# Patient Record
Sex: MALE | Race: BLACK OR AFRICAN AMERICAN | NOT HISPANIC OR LATINO | Employment: UNEMPLOYED | URBAN - METROPOLITAN AREA
[De-identification: names, ages, dates, MRNs, and addresses within clinical notes are randomized per-mention and may not be internally consistent; named-entity substitution may affect disease eponyms.]

---

## 2017-03-13 ENCOUNTER — HOSPITAL ENCOUNTER (EMERGENCY)
Facility: HOSPITAL | Age: 57
Discharge: HOME | End: 2017-03-13

## 2017-03-13 VITALS
HEART RATE: 100 BPM | TEMPERATURE: 98 F | HEIGHT: 69 IN | RESPIRATION RATE: 20 BRPM | OXYGEN SATURATION: 96 % | BODY MASS INDEX: 29.62 KG/M2 | WEIGHT: 200 LBS | SYSTOLIC BLOOD PRESSURE: 165 MMHG | DIASTOLIC BLOOD PRESSURE: 95 MMHG

## 2017-03-13 DIAGNOSIS — B34.9 VIRAL SYNDROME: Primary | ICD-10-CM

## 2017-03-13 DIAGNOSIS — R52 BODY ACHES: ICD-10-CM

## 2017-03-13 PROCEDURE — 96372 THER/PROPH/DIAG INJ SC/IM: CPT

## 2017-03-13 PROCEDURE — 63600175 PHARM REV CODE 636 W HCPCS: Performed by: EMERGENCY MEDICINE

## 2017-03-13 PROCEDURE — 99283 EMERGENCY DEPT VISIT LOW MDM: CPT | Mod: 25

## 2017-03-13 RX ORDER — DEXAMETHASONE SODIUM PHOSPHATE 4 MG/ML
8 INJECTION, SOLUTION INTRA-ARTICULAR; INTRALESIONAL; INTRAMUSCULAR; INTRAVENOUS; SOFT TISSUE
Status: COMPLETED | OUTPATIENT
Start: 2017-03-13 | End: 2017-03-13

## 2017-03-13 RX ADMIN — DEXAMETHASONE SODIUM PHOSPHATE 8 MG: 4 INJECTION, SOLUTION INTRAMUSCULAR; INTRAVENOUS at 10:03

## 2017-03-14 NOTE — ED PROVIDER NOTES
SCRIBE #1 NOTE: I, Shalom Short, am scribing for, and in the presence of, Stanley Gray MD. I have scribed the entire note.      History      Chief Complaint   Patient presents with    Generalized Body Aches     x 2 days, cough       Review of patient's allergies indicates:  No Known Allergies     HPI   HPI    3/13/2017, 10:44 PM   History obtained from the patient      History of Present Illness: Maureen Luther is a 56 y.o. male patient who presents to the Emergency Department for generalized myalgias which onset gradually 2 days ago. Sx are located to BLE. Sx are constant and moderate in severity. Sx are described as aches. There are no mitigating or exacerbating factors noted. Associated sx include cough.  Pt denies any fever, CP, SOB, N/V/D, focal weakness or numbness, and all other sx at this time. Pt is current smoker. No further complaints or concerns at this time.       Arrival mode: Personal vehicle     PCP: Primary Doctor No       Past Medical History:  Past Medical History:   Diagnosis Date    Facial fracture     Medical history non-contributory     Scrotal abscess        Past Surgical History:  Past Surgical History:   Procedure Laterality Date    None      ORBITAL RECONSTRUCTION      SCROTAL SURGERY           Family History:  Family History   Problem Relation Age of Onset    Hypertension         Social History:  Social History     Social History Main Topics    Smoking status: Current Every Day Smoker     Packs/day: 1.00     Types: Cigarettes    Smokeless tobacco: Not on file    Alcohol use No    Drug use: No    Sexual activity: Not on file       ROS   Review of Systems   Constitutional: Negative for chills and fever.   HENT: Negative for sore throat and trouble swallowing.    Respiratory: Positive for cough. Negative for shortness of breath.    Cardiovascular: Negative for chest pain.   Gastrointestinal: Negative for abdominal pain, diarrhea, nausea and vomiting.   Genitourinary: Negative  "for dysuria and hematuria.   Musculoskeletal: Positive for myalgias. Negative for back pain.   Skin: Negative for rash and wound.   Neurological: Negative for weakness and numbness.   Hematological: Does not bruise/bleed easily.   All other systems reviewed and are negative.      Physical Exam    Initial Vitals   BP Pulse Resp Temp SpO2   03/13/17 2108 03/13/17 2108 03/13/17 2108 03/13/17 2108 03/13/17 2108   165/95 100 20 98.2 °F (36.8 °C) 96 %      Physical Exam  Nursing Notes and Vital Signs Reviewed.  Constitutional: Patient is in no acute distress. Awake and alert. Well-developed and well-nourished.  Head: Atraumatic. Normocephalic.  Eyes: PERRL. EOM intact. Conjunctivae are not pale. No scleral icterus.  ENT: Mucous membranes are moist. Oropharynx is clear and symmetric.    Neck: Supple. Full ROM. No lymphadenopathy.  Cardiovascular: Regular rate. Regular rhythm. No murmurs, rubs, or gallops. Distal pulses are 2+ and symmetric.  Pulmonary/Chest: No respiratory distress. Clear to auscultation bilaterally. No wheezing, rales, or rhonchi.  Abdominal: Soft and non-distended.  There is no tenderness.  No rebound, guarding, or rigidity.  Good bowel sounds.    Genitourinary: No CVA tenderness  Musculoskeletal: Moves all extremities. No obvious deformities. No edema. No calf tenderness.  Skin: Warm and dry.  Neurological:  Alert, awake, and appropriate.  Normal speech.  No acute focal neurological deficits are appreciated.  Psychiatric: Normal affect. Good eye contact. Appropriate in content.    ED Course    Procedures  ED Vital Signs:  Vitals:    03/13/17 2108   BP: (!) 165/95   Pulse: 100   Resp: 20   Temp: 98.2 °F (36.8 °C)   TempSrc: Oral   SpO2: 96%   Weight: 90.7 kg (200 lb)   Height: 5' 9" (1.753 m)      The Emergency Provider reviewed the vital signs and test results, which are outlined above.    ED Discussion     10:48 PM: Initial encounter. Discussed with pt all pertinent ED information. Discussed pt dx and " plan of tx. Gave pt all f/u and return to the ED instructions. All questions and concerns were addressed at this time. Pt expresses understanding of information and instructions, and is comfortable with plan to discharge. Pt is stable for discharge.        I discussed with patient and/or family/caretaker that evaluation in the ED does not suggest any emergent or life threatening medical conditions requiring immediate intervention beyond what was provided in the ED, and I believe patient is safe for discharge.  Regardless, an unremarkable evaluation in the ED does not preclude the development or presence of a serious of life threatening condition. As such, patient was instructed to return immediately for any worsening or change in current symptoms.      Pre-hypertension/Hypertension: The pt has been informed that they may have pre-hypertension or hypertension based on a blood pressure reading in the ED. I recommend that the pt call the PCP listed on their discharge instructions or a physician of their choice this week to arrange f/u for further evaluation of possible pre-hypertension or hypertension.     ED Medication(s):  Medications   dexamethasone injection 8 mg (8 mg Intramuscular Given 3/13/17 4521)       There are no discharge medications for this patient.      Follow-up Information     Follow up with [facility] In 2 days.    Specialty:  Internal Medicine    Contact information:    [address] [address] [facility] [address] [phone]    Additional information:    (off [address]) 1st floor        Follow up with [facility].    Specialty:  Emergency Medicine    Why:  If symptoms worsen    Contact information:    [address] [address] [facility] [address] [phone]             Medical Decision Making        Additional MDM:   Smoking Cessation: The patient is a smoker. The patient was counseled on smoking cessation for: 4 minutes. The patient  was counseled on tobacco related  health complications.        Scribe Attestation:   Scribe #1: I performed the above scribed service and the documentation accurately describes the services I performed. I attest to the accuracy of the note.    Attending:   Physician Attestation Statement for Scribe #1: I, Stanley Gray MD, personally performed the services described in this documentation, as scribed by Shalom Short in my presence, and it is both accurate and complete.          Clinical Impression       ICD-10-CM ICD-9-CM   1. Viral syndrome B34.9 079.99   2. Body aches R52 780.96       Disposition:   Disposition: Discharged  Condition: Stable         Stanley Gray MD  03/15/17 9676

## 2017-03-14 NOTE — DISCHARGE INSTRUCTIONS
"  Viral Syndrome (Adult)  A viral illness may cause a number of symptoms. The symptoms depend on the part of the body that the virus affects. If it settles in your nose, throat, and lungs, it may cause cough, sore throat, congestion, and sometimes headache. If it settles in your stomach and intestinal tract, it may cause vomiting and diarrhea. Sometimes it causes vague symptoms like "aching all over," feeling tired, loss of appetite, or fever.  A viral illness usually lasts 1 to 2 weeks, but sometimes it lasts longer. In some cases, a more serious infection can look like a viral syndrome in the first few days of the illness. You may need another exam and additional tests to know the difference. Watch for the warning signs listed below.  Home care  Follow these guidelines for taking care of yourself at home:  · If symptoms are severe, rest at home for the first 2 to 3 days.  · Stay away from cigarette smoke - both your smoke and the smoke from others.  · You may use over-the-counter acetaminophen or ibuprofen for fever, muscle aching, and headache, unless another medicine was prescribed for this. If you have chronic liver or kidney disease or ever had a stomach ulcer or GI bleeding, talk with your doctor before using these medicines. No one who is younger than 18 and ill with a fever should take aspirin. It may cause severe disease or death.  · Your appetite may be poor, so a light diet is fine. Avoid dehydration by drinking 8 to 12 8-ounce glasses of fluids each day. This may include water; orange juice; lemonade; apple, grape, and cranberry juice; clear fruit drinks; electrolyte replacement and sports drinks; and decaffeinated teas and coffee. If you have been diagnosed with a kidney disease, ask your doctor how much and what types of fluids you should drink to prevent dehydration. If you have kidney disease, drinking too much fluid can cause it build up in the your body and be dangerous to your " health.  · Over-the-counter remedies won't shorten the length of the illness but may be helpful for cough, sore throat; and nasal and sinus congestion. Don't use decongestants if you have high blood pressure.  Follow-up care  Follow up with your healthcare provider if you do not improve over the next week.  Call 911  Get emergency medical care if any of the following occur:  · Convulsion  · Feeling weak, dizzy, or like you are going to faint  · Chest pain, shortness of breath, wheezing, or difficulty breathing  When to seek medical advice  Call your healthcare provider right away if any of these occur:  · Cough with lots of colored sputum (mucus) or blood in your sputum  · Chest pain, shortness of breath, wheezing, or difficulty breathing  · Severe headache; face, neck, or ear pain  · Severe, constant pain in the lower right side of your belly (abdominal)  · Continued vomiting (cant keep liquids down)  · Frequent diarrhea (more than 5 times a day); blood (red or black color) or mucus in diarrhea  · Feeling weak, dizzy, or like you are going to faint  · Extreme thirst  · Fever of 100.4°F (38°C) or higher, or as directed by your healthcare provider  Date Last Reviewed: 9/25/2015  © 9615-6275 [employer]. [address] [place] [address], [place] [address]. All rights reserved. This information is not intended as a substitute for professional medical care. Always follow your healthcare professional's instructions.

## 2017-03-24 ENCOUNTER — HOSPITAL ENCOUNTER (EMERGENCY)
Facility: HOSPITAL | Age: 57
Discharge: HOME | End: 2017-03-24
Attending: EMERGENCY MEDICINE

## 2017-03-24 VITALS
TEMPERATURE: 97 F | DIASTOLIC BLOOD PRESSURE: 110 MMHG | SYSTOLIC BLOOD PRESSURE: 174 MMHG | HEART RATE: 73 BPM | OXYGEN SATURATION: 97 % | HEIGHT: 69 IN | RESPIRATION RATE: 20 BRPM | BODY MASS INDEX: 29.62 KG/M2 | WEIGHT: 200 LBS

## 2017-03-24 DIAGNOSIS — H70.91 MASTOIDITIS OF RIGHT SIDE: Primary | ICD-10-CM

## 2017-03-24 PROCEDURE — 99283 EMERGENCY DEPT VISIT LOW MDM: CPT

## 2017-03-24 RX ORDER — NAPROXEN 375 MG/1
375 TABLET ORAL 2 TIMES DAILY WITH MEALS
Qty: 14 TABLET | Refills: 0 | Status: SHIPPED | OUTPATIENT
Start: 2017-03-24 | End: 2017-11-14 | Stop reason: ALTCHOICE

## 2017-03-24 RX ORDER — AMOXICILLIN AND CLAVULANATE POTASSIUM 875; 125 MG/1; MG/1
1 TABLET, FILM COATED ORAL 2 TIMES DAILY
Qty: 14 TABLET | Refills: 0 | Status: SHIPPED | OUTPATIENT
Start: 2017-03-24 | End: 2017-04-03

## 2017-03-24 RX ORDER — TRAMADOL HYDROCHLORIDE 50 MG/1
50 TABLET ORAL EVERY 6 HOURS PRN
Qty: 12 TABLET | Refills: 0 | Status: SHIPPED | OUTPATIENT
Start: 2017-03-24 | End: 2017-11-14 | Stop reason: ALTCHOICE

## 2017-03-24 NOTE — ED AVS SNAPSHOT
[facility]  [id number] [place] [id number]               Maureen Luther   3/24/2017  3:57 AM   ED    Description:  Male : 1960   Department:  [facility]            Your Care was Coordinated By:     Provider Role From To    Chasity Irizarry MD Attending Provider 17 0423 --      Reason for Visit     Otalgia           Diagnoses this Visit        Comments    Mastoiditis of right side    -  Primary       ED Disposition     ED Disposition Condition Comment    Discharge             To Do List           Follow-up Information     Follow up with [facility] In 3 days.    Contact information:    [address] [facility] [address]  [phone]          Follow up with [facility].    Specialty:  Emergency Medicine    Why:  As needed, If symptoms worsen    Contact information:    [address] [place] [id number] [phone]       These Medications        Disp Refills Start End    amoxicillin-clavulanate 875-125mg (AUGMENTIN) 875-125 mg per tablet 14 tablet 0 3/24/2017 4/3/2017    Take 1 tablet by mouth 2 (two) times daily. - Oral    naproxen (NAPROSYN) 375 MG tablet 14 tablet 0 3/24/2017     Take 1 tablet (375 mg total) by mouth 2 (two) times daily with meals. - Oral    tramadol (ULTRAM) 50 mg tablet 12 tablet 0 3/24/2017     Take 1 tablet (50 mg total) by mouth every 6 (six) hours as needed for Pain. - Oral      [facility]     [facility] -  Assistance  Registered nurses in the [facility] provide clinical advisement, health education, appointment booking, and other advisory services.  Call for this free service at [phone].             Medications           Message regarding Medications     Verify the changes and/or additions to your medication regime listed below are the same as discussed with your clinician today.  If any of  "these changes or additions are incorrect, please notify your healthcare provider.        START taking these NEW medications        Refills    amoxicillin-clavulanate 875-125mg (AUGMENTIN) 875-125 mg per tablet 0    Sig: Take 1 tablet by mouth 2 (two) times daily.    Class: Print    Route: Oral    naproxen (NAPROSYN) 375 MG tablet 0    Sig: Take 1 tablet (375 mg total) by mouth 2 (two) times daily with meals.    Class: Print    Route: Oral    tramadol (ULTRAM) 50 mg tablet 0    Sig: Take 1 tablet (50 mg total) by mouth every 6 (six) hours as needed for Pain.    Class: Print    Route: Oral           Verify that the below list of medications is an accurate representation of the medications you are currently taking.  If none reported, the list may be blank. If incorrect, please contact your healthcare provider. Carry this list with you in case of emergency.           Current Medications     amoxicillin-clavulanate 875-125mg (AUGMENTIN) 875-125 mg per tablet Take 1 tablet by mouth 2 (two) times daily.    naproxen (NAPROSYN) 375 MG tablet Take 1 tablet (375 mg total) by mouth 2 (two) times daily with meals.    tramadol (ULTRAM) 50 mg tablet Take 1 tablet (50 mg total) by mouth every 6 (six) hours as needed for Pain.           Clinical Reference Information           Your Vitals Were     BP Pulse Temp Resp Height Weight    174/110 (BP Location: Right arm, Patient Position: Sitting) 73 96.9 °F (36.1 °C) (Oral) 20 5' 9" (1.753 m) 90.7 kg (200 lb)    SpO2 BMI             97% 29.53 kg/m2         Allergies as of 3/24/2017     No Known Allergies      Immunizations Administered on Date of Encounter - 3/24/2017     None      ED Micro, Lab, POCT     None      ED Imaging Orders     None      Discharge References/Attachments     EARACHE WITHOUT INFECTION (ADULT) (ENGLISH)      [facility] Sign-Up     Activating your [facility] account is as easy as 1-2-3!     1) Visit [email or web address], select Sign Up Now, enter this activation code and " your date of birth, then select Next.  [email or web address]  Expires: 4/27/2017 10:47 PM      2) Create a username and password to use when you visit [facility] in the future and select a security question in case you lose your password and select Next.    3) Enter your e-mail address and click Sign Up!    Additional Information  If you have questions, please e-mail Managed [email or web address] or call [phone] to talk to our [facility] staff. Remember, [employer] is NOT to be used for urgent needs. For medical emergencies, dial 911.          [facility] complies with applicable Federal civil rights laws and does not discriminate on the basis of race, color, national origin, age, disability, or sex.        Language Assistance Services     ATTENTION: Language assistance services are available, free of charge. Please call [phone].      ATENCIÓN: Si habla español, tiene a florez disposición servicios gratuitos de asistencia lingüística. Llame al [phone].     CHÚ Ý: N?u b?n nói Ti?ng Vi?t, có các d?ch v? h? tr? ngôn ng? mi?n phí dành cho b?n. G?i s? [phone].

## 2017-11-13 VITALS
RESPIRATION RATE: 20 BRPM | HEIGHT: 69 IN | SYSTOLIC BLOOD PRESSURE: 127 MMHG | DIASTOLIC BLOOD PRESSURE: 82 MMHG | OXYGEN SATURATION: 99 % | BODY MASS INDEX: 24.33 KG/M2 | TEMPERATURE: 98 F | HEART RATE: 80 BPM | WEIGHT: 164.25 LBS

## 2017-11-13 PROCEDURE — 99283 EMERGENCY DEPT VISIT LOW MDM: CPT

## 2017-11-14 ENCOUNTER — HOSPITAL ENCOUNTER (EMERGENCY)
Facility: HOSPITAL | Age: 57
Discharge: HOME | End: 2017-11-14
Attending: EMERGENCY MEDICINE

## 2017-11-14 DIAGNOSIS — R52 PAIN: ICD-10-CM

## 2017-11-14 DIAGNOSIS — L29.9 ITCHING: ICD-10-CM

## 2017-11-14 DIAGNOSIS — B86 SCABIES: Primary | ICD-10-CM

## 2017-11-14 RX ORDER — PERMETHRIN 50 MG/G
CREAM TOPICAL
Qty: 60 G | Refills: 1 | Status: SHIPPED | OUTPATIENT
Start: 2017-11-14 | End: 2019-03-29

## 2017-11-14 RX ORDER — DICLOFENAC SODIUM 50 MG/1
50 TABLET, DELAYED RELEASE ORAL 3 TIMES DAILY PRN
Qty: 20 TABLET | Refills: 0 | Status: SHIPPED | OUTPATIENT
Start: 2017-11-14 | End: 2019-03-29

## 2017-11-14 RX ORDER — HYDROXYZINE HYDROCHLORIDE 25 MG/1
25 TABLET, FILM COATED ORAL EVERY 6 HOURS
Qty: 12 TABLET | Refills: 0 | Status: SHIPPED | OUTPATIENT
Start: 2017-11-14 | End: 2023-08-30

## 2017-11-14 NOTE — ED PROVIDER NOTES
HISTORY     Chief Complaint   Patient presents with    Rash     pt reports rash to lower abd, spine, shoulder blades, and legs for about 2 weeks     Review of patient's allergies indicates:  No Known Allergies     HPI   The history is provided by the patient.   Rash    This is a new problem. The current episode started several days ago. The problem has been gradually worsening. Associated with: kid with scabies  The rash is present on the right arm, left arm, back, abdomen, right hand and left hand. Associated symptoms include itching. He has tried antibiotic cream for the symptoms. The treatment provided no relief.        PCP: Primary Doctor No     Past Medical History:  Past Medical History:   Diagnosis Date    Facial fracture     Medical history non-contributory     Scrotal abscess         Past Surgical History:  Past Surgical History:   Procedure Laterality Date    None      ORBITAL RECONSTRUCTION      SCROTAL SURGERY          Family History:  Family History   Problem Relation Age of Onset    Hypertension          Social History:  Social History     Social History Main Topics    Smoking status: Current Every Day Smoker     Packs/day: 1.00     Types: Cigarettes    Smokeless tobacco: Not on file    Alcohol use No    Drug use: No    Sexual activity: Not on file         ROS   Review of Systems   Constitutional: Negative for fever.   HENT: Negative for sore throat.    Respiratory: Negative for shortness of breath.    Cardiovascular: Negative for chest pain.   Gastrointestinal: Negative for nausea.   Genitourinary: Negative for dysuria.   Musculoskeletal: Negative for back pain.   Skin: Positive for itching and rash.   Neurological: Negative for weakness.   Hematological: Does not bruise/bleed easily.       PHYSICAL EXAM     Initial Vitals [11/13/17 2328]   BP Pulse Resp Temp SpO2   127/82 80 20 98.1 °F (36.7 °C) 99 %      MAP       97           Physical Exam    Constitutional: He appears  "well-developed and well-nourished. No distress.   HENT:   Head: Normocephalic and atraumatic.   Eyes: Conjunctivae are normal. Pupils are equal, round, and reactive to light.   Neck: Normal range of motion. Neck supple.   Cardiovascular: Normal rate, regular rhythm and normal heart sounds.   Pulmonary/Chest: Breath sounds normal.   Abdominal: Soft. Bowel sounds are normal.   Musculoskeletal: Normal range of motion.   Neurological: He is alert and oriented to person, place, and time. No cranial nerve deficit.   Skin: Skin is warm and dry.   Linear type lesion with undermining. excoriation brooks. Rash appears to be like scabies in nature.     Psychiatric: He has a normal mood and affect.          ED COURSE   Procedures  ED ONGOING VITALS:  Vitals:    11/13/17 2328   BP: 127/82   Pulse: 80   Resp: 20   Temp: 98.1 °F (36.7 °C)   TempSrc: Oral   SpO2: 99%   Weight: 74.5 kg (164 lb 3.9 oz)   Height: 5' 9" (1.753 m)         ABNORMAL LAB VALUES:  Labs Reviewed - No data to display      ALL LAB VALUES:      RADIOLOGY STUDIES:  Imaging Results    None                   The above vital signs and test results have been reviewed by the emergency provider.     ED Medications:  Medications - No data to display    Discharge Medications:  Discharge Medication List as of 11/14/2017 12:51 AM      START taking these medications    Details   diclofenac (VOLTAREN) 50 MG EC tablet Take 1 tablet (50 mg total) by mouth 3 (three) times daily as needed., Starting Tue 11/14/2017, Print      hydrOXYzine HCl (ATARAX) 25 MG tablet Take 1 tablet (25 mg total) by mouth every 6 (six) hours., Starting Tue 11/14/2017, Print      permethrin (ELIMITE) 5 % cream Apply to affected area once and repeat in 7 days, Print            Follow-up Information     [facility]    Specialty:  Emergency Medicine  Why:  As needed, If symptoms worsen  Contact information:  [address]  [facility] [address] [phone]        "    Schedule an appointment as soon as possible for a visit  with PCP.    Contact information:  [phone]                I discussed with patient and/or family/caretaker that evaluation in the ED does not suggest any emergent or life threatening medical conditions requiring immediate intervention beyond what was provided in the ED, and I believe patient is safe for discharge. Regardless, an unremarkable evaluation in the ED does not preclude the development or presence of a serious or life threatening condition. As such, patient was instructed to return immediately for any worsening or change in current symptoms.           MEDICAL DECISION MAKING                 CLINICAL IMPRESSION       ICD-10-CM ICD-9-CM   1. Scabies B86 133.0   2. Itching L29.9 698.9   3. Pain R52 780.96       Disposition:   Disposition: Discharged  Condition: Stable         Miller Mccullough NP  11/14/17 0215

## 2017-11-26 ENCOUNTER — HOSPITAL ENCOUNTER (EMERGENCY)
Facility: HOSPITAL | Age: 57
Discharge: HOME | End: 2017-11-26
Attending: EMERGENCY MEDICINE

## 2017-11-26 VITALS
HEIGHT: 69 IN | WEIGHT: 157.19 LBS | BODY MASS INDEX: 23.28 KG/M2 | DIASTOLIC BLOOD PRESSURE: 80 MMHG | OXYGEN SATURATION: 95 % | SYSTOLIC BLOOD PRESSURE: 122 MMHG | TEMPERATURE: 99 F | HEART RATE: 91 BPM | RESPIRATION RATE: 16 BRPM

## 2017-11-26 DIAGNOSIS — J20.9 ACUTE BRONCHITIS, UNSPECIFIED ORGANISM: Primary | ICD-10-CM

## 2017-11-26 DIAGNOSIS — J01.00 ACUTE MAXILLARY SINUSITIS, RECURRENCE NOT SPECIFIED: ICD-10-CM

## 2017-11-26 PROCEDURE — 99283 EMERGENCY DEPT VISIT LOW MDM: CPT

## 2017-11-26 RX ORDER — LEVOFLOXACIN 500 MG/1
500 TABLET, FILM COATED ORAL DAILY
Qty: 10 TABLET | Refills: 0 | Status: SHIPPED | OUTPATIENT
Start: 2017-11-26 | End: 2017-12-06

## 2017-11-26 RX ORDER — PREDNISONE 50 MG/1
50 TABLET ORAL DAILY
Qty: 5 TABLET | Refills: 0 | Status: SHIPPED | OUTPATIENT
Start: 2017-11-26 | End: 2017-12-01

## 2017-11-26 RX ORDER — PROMETHAZINE HYDROCHLORIDE AND CODEINE PHOSPHATE 6.25; 1 MG/5ML; MG/5ML
5 SOLUTION ORAL EVERY 4 HOURS PRN
Qty: 240 ML | Refills: 0 | Status: SHIPPED | OUTPATIENT
Start: 2017-11-26 | End: 2017-12-06

## 2017-11-26 NOTE — ED PROVIDER NOTES
SCRIBE #1 NOTE: I, Babita Saleh, am scribing for, and in the presence of, Anjel Rodriguez NP. I have scribed the entire note.      History      Chief Complaint   Patient presents with    coughing     x 1 week, now pain to bilateral sides of chest and abd, also facial pain, denies fever       Review of patient's allergies indicates:  No Known Allergies     HPI   HPI    11/26/2017, 3:14 PM   History obtained from the patient      History of Present Illness: Maureen Luther is a 57 y.o. male patient who presents to the Emergency Department for coughing which onset gradually 7 days ago. Symptoms are constant and moderate in severity. No mitigating or exacerbating factors reported. Associated sxs include congestion, facial pain, and abd pain. Patient denies any fever, chills, n/v/d, CP, SOB, and all other sxs at this time. Prior Tx includes OTC medication with no relief. No further complaints or concerns at this time.         Arrival mode: Personal vehicle     PCP: Primary Doctor No       Past Medical History:  Past Medical History:   Diagnosis Date    Facial fracture     Medical history non-contributory     Scrotal abscess        Past Surgical History:  Past Surgical History:   Procedure Laterality Date    None      ORBITAL RECONSTRUCTION      SCROTAL SURGERY           Family History:  Family History   Problem Relation Age of Onset    Hypertension         Social History:  Social History     Social History Main Topics    Smoking status: Current Every Day Smoker     Packs/day: 1.00     Types: Cigarettes    Smokeless tobacco: Not on file    Alcohol use No    Drug use: No    Sexual activity: Not on file       ROS   Review of Systems   Constitutional: Negative for chills and fever.   HENT: Positive for congestion and sinus pain. Negative for sore throat.    Respiratory: Positive for cough. Negative for shortness of breath.    Cardiovascular: Negative for chest pain.   Gastrointestinal: Positive for abdominal  pain. Negative for diarrhea, nausea and vomiting.   Genitourinary: Negative for dysuria.   Musculoskeletal: Negative for back pain.   Skin: Negative for rash.   Neurological: Negative for weakness.   Hematological: Does not bruise/bleed easily.   All other systems reviewed and are negative.    Physical Exam      Initial Vitals [11/26/17 1502]   BP Pulse Resp Temp SpO2   122/80 91 16 99.3 °F (37.4 °C) 95 %      MAP       94          Physical Exam  Nursing Notes and Vital Signs Reviewed.  Constitutional: Patient is in no acute distress. Well-developed and well-nourished.  Head: Atraumatic. Normocephalic. L maxillary sinus tenderness.   Eyes: PERRL. EOM intact. Conjunctivae are not pale. No scleral icterus.  Ears: Right TM normal. Left TM normal. No erythema. No bulging. No effusion or air-fluid levels. No perforation.   Nose: Patent nares. Turbinates are normal. Nasal drainage.   Throat: Moist mucous membranes. Posterior oropharynx is symmetric without erythema. Tonsillar exudate is present. No trismus. Normal handling of secretions. No stridor.   Neck: Supple. Full ROM. No lymphadenopathy.  Cardiovascular: Regular rate. Regular rhythm. No murmurs, rubs, or gallops. Distal pulses are 2+ and symmetric.  Pulmonary/Chest: No respiratory distress. Bilateral chest congestion that clears with coughing.  Abdominal: Soft and non-distended.  There is no tenderness.  No rebound, guarding, or rigidity. Good bowel sounds.  Genitourinary: No CVA tenderness  Musculoskeletal: Moves all extremities. No obvious deformities. No edema. No calf tenderness.  Skin: Warm and dry.  Neurological:  Alert, awake, and appropriate.  Normal speech.  No acute focal neurological deficits are appreciated.  Psychiatric: Normal affect. Good eye contact. Appropriate in content.      ED Course    Procedures  ED Vital Signs:  Vitals:    11/26/17 1502   BP: 122/80   Pulse: 91   Resp: 16   Temp: 99.3 °F (37.4 °C)   TempSrc: Oral   SpO2: 95%   Weight: 71.3  "kg (157 lb 3 oz)   Height: 5' 9" (1.753 m)                The Emergency Provider reviewed the vital signs and test results, which are outlined above.    ED Discussion     3:17 PM: Discussed with pt all pertinent ED information and results. Discussed pt dx and plan of tx. Gave pt all f/u and return to the ED instructions. All questions and concerns were addressed at this time. Pt expresses understanding of information and instructions, and is comfortable with plan to discharge. Pt is stable for discharge.    Driving or other activities under influence of medications - Patient and/or family/caretaker was given a prescription for, or instructed to use a medicine that may impair ability to drive, operate machinery, or participate in other potentially dangerous activities.  Patient was instructed not to participate in these activities while under the influence of these medications.      ED Medication(s):  Medications - No data to display    New Prescriptions    LEVOFLOXACIN (LEVAQUIN) 500 MG TABLET    Take 1 tablet (500 mg total) by mouth once daily.    PREDNISONE (DELTASONE) 50 MG TAB    Take 1 tablet (50 mg total) by mouth once daily.    PROMETHAZINE-CODEINE 6.25-10 MG/5 ML (PHENERGAN WITH CODEINE) 6.25-10 MG/5 ML SYRUP    Take 5 mLs by mouth every 4 (four) hours as needed for Cough.       Follow-up Information     Primary Doctor No. Schedule an appointment as soon as possible for a visit in 2 days.                   Medical Decision Making              Scribe Attestation:   Scribe #1: I performed the above scribed service and the documentation accurately describes the services I performed. I attest to the accuracy of the note.    Attending:   Physician Attestation Statement for Scribe #1: I, Anjel Rodriguez NP, personally performed the services described in this documentation, as scribed by Babita Saleh, in my presence, and it is both accurate and complete.         Attending Attestation:     Physician Attestation " Statement for NP/PA:   I discussed this assessment and plan of this patient with the NP/PA, but I did not personally examine the patient. The face to face encounter was performed by the NP/PA.              Clinical Impression       ICD-10-CM ICD-9-CM   1. Acute bronchitis, unspecified organism J20.9 466.0   2. Acute maxillary sinusitis, recurrence not specified J01.00 461.0       Disposition:   Disposition: Discharged  Condition: Stable         Anjel Rodriguez NP  11/26/17 1520       Dong Padilla MD  11/27/17 0867

## 2018-04-14 ENCOUNTER — HOSPITAL ENCOUNTER (EMERGENCY)
Facility: HOSPITAL | Age: 58
Discharge: LEFT BEFORE DISCHARGE | End: 2018-04-14
Attending: EMERGENCY MEDICINE

## 2018-04-14 VITALS
HEART RATE: 88 BPM | OXYGEN SATURATION: 99 % | BODY MASS INDEX: 21.91 KG/M2 | WEIGHT: 147.94 LBS | SYSTOLIC BLOOD PRESSURE: 133 MMHG | RESPIRATION RATE: 18 BRPM | DIASTOLIC BLOOD PRESSURE: 95 MMHG | HEIGHT: 69 IN | TEMPERATURE: 99 F

## 2018-04-14 DIAGNOSIS — R68.84 JAW PAIN: ICD-10-CM

## 2018-04-14 DIAGNOSIS — M25.519 SHOULDER PAIN: ICD-10-CM

## 2018-04-14 PROCEDURE — 99282 EMERGENCY DEPT VISIT SF MDM: CPT

## 2018-04-14 RX ORDER — HYDROCODONE BITARTRATE AND ACETAMINOPHEN 10; 325 MG/1; MG/1
1 TABLET ORAL
Status: DISCONTINUED | OUTPATIENT
Start: 2018-04-14 | End: 2018-04-14 | Stop reason: HOSPADM

## 2018-04-14 NOTE — ED NOTES
Pt informed that he will need ride present in order to get pain medication. States they are at the grocery store but will be coming back. Patient encouraged to call friend.
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
## 2019-03-29 ENCOUNTER — HOSPITAL ENCOUNTER (EMERGENCY)
Facility: HOSPITAL | Age: 59
Discharge: HOME | End: 2019-03-29
Attending: EMERGENCY MEDICINE
Payer: MEDICAID

## 2019-03-29 VITALS
RESPIRATION RATE: 18 BRPM | TEMPERATURE: 98 F | HEART RATE: 78 BPM | BODY MASS INDEX: 22.28 KG/M2 | HEIGHT: 68 IN | SYSTOLIC BLOOD PRESSURE: 123 MMHG | OXYGEN SATURATION: 100 % | WEIGHT: 147 LBS | DIASTOLIC BLOOD PRESSURE: 79 MMHG

## 2019-03-29 DIAGNOSIS — F19.10 SUBSTANCE ABUSE: ICD-10-CM

## 2019-03-29 DIAGNOSIS — R11.2 NON-INTRACTABLE VOMITING WITH NAUSEA, UNSPECIFIED VOMITING TYPE: Primary | ICD-10-CM

## 2019-03-29 DIAGNOSIS — Z72.0 TOBACCO ABUSE DISORDER: ICD-10-CM

## 2019-03-29 LAB
ALBUMIN SERPL BCP-MCNC: 3.7 G/DL (ref 3.5–5.2)
ALP SERPL-CCNC: 76 U/L (ref 55–135)
ALT SERPL W/O P-5'-P-CCNC: 24 U/L (ref 10–44)
AMPHET+METHAMPHET UR QL: NEGATIVE
ANION GAP SERPL CALC-SCNC: 9 MMOL/L (ref 8–16)
AST SERPL-CCNC: 24 U/L (ref 10–40)
BACTERIA #/AREA URNS HPF: ABNORMAL /HPF
BARBITURATES UR QL SCN>200 NG/ML: NEGATIVE
BASOPHILS # BLD AUTO: 0.02 K/UL (ref 0–0.2)
BASOPHILS NFR BLD: 0.3 % (ref 0–1.9)
BENZODIAZ UR QL SCN>200 NG/ML: NEGATIVE
BILIRUB SERPL-MCNC: 0.6 MG/DL (ref 0.1–1)
BILIRUB UR QL STRIP: NEGATIVE
BUN SERPL-MCNC: 16 MG/DL (ref 6–20)
BZE UR QL SCN: NORMAL
CALCIUM SERPL-MCNC: 8.8 MG/DL (ref 8.7–10.5)
CANNABINOIDS UR QL SCN: NORMAL
CHLORIDE SERPL-SCNC: 108 MMOL/L (ref 95–110)
CLARITY UR: CLEAR
CO2 SERPL-SCNC: 21 MMOL/L (ref 23–29)
COLOR UR: YELLOW
CREAT SERPL-MCNC: 1.2 MG/DL (ref 0.5–1.4)
CREAT UR-MCNC: 292.4 MG/DL (ref 23–375)
DIFFERENTIAL METHOD: ABNORMAL
EOSINOPHIL # BLD AUTO: 0.2 K/UL (ref 0–0.5)
EOSINOPHIL NFR BLD: 2.8 % (ref 0–8)
ERYTHROCYTE [DISTWIDTH] IN BLOOD BY AUTOMATED COUNT: 12.7 % (ref 11.5–14.5)
EST. GFR  (AFRICAN AMERICAN): >60 ML/MIN/1.73 M^2
EST. GFR  (NON AFRICAN AMERICAN): >60 ML/MIN/1.73 M^2
GLUCOSE SERPL-MCNC: 124 MG/DL (ref 70–110)
GLUCOSE UR QL STRIP: NEGATIVE
HCT VFR BLD AUTO: 34.6 % (ref 40–54)
HGB BLD-MCNC: 12.1 G/DL (ref 14–18)
HGB UR QL STRIP: NEGATIVE
HYALINE CASTS #/AREA URNS LPF: 0 /LPF
KETONES UR QL STRIP: NEGATIVE
LEUKOCYTE ESTERASE UR QL STRIP: NEGATIVE
LIPASE SERPL-CCNC: 71 U/L (ref 4–60)
LYMPHOCYTES # BLD AUTO: 0.8 K/UL (ref 1–4.8)
LYMPHOCYTES NFR BLD: 11.6 % (ref 18–48)
MCH RBC QN AUTO: 31.1 PG (ref 27–31)
MCHC RBC AUTO-ENTMCNC: 35 G/DL (ref 32–36)
MCV RBC AUTO: 89 FL (ref 82–98)
METHADONE UR QL SCN>300 NG/ML: NEGATIVE
MICROSCOPIC COMMENT: ABNORMAL
MONOCYTES # BLD AUTO: 0.2 K/UL (ref 0.3–1)
MONOCYTES NFR BLD: 3.5 % (ref 4–15)
NEUTROPHILS # BLD AUTO: 5.6 K/UL (ref 1.8–7.7)
NEUTROPHILS NFR BLD: 81.8 % (ref 38–73)
NITRITE UR QL STRIP: NEGATIVE
OPIATES UR QL SCN: NEGATIVE
PCP UR QL SCN>25 NG/ML: NEGATIVE
PH UR STRIP: 6 [PH] (ref 5–8)
PLATELET # BLD AUTO: 222 K/UL (ref 150–350)
PMV BLD AUTO: 9.3 FL (ref 9.2–12.9)
POTASSIUM SERPL-SCNC: 3.6 MMOL/L (ref 3.5–5.1)
PROT SERPL-MCNC: 7 G/DL (ref 6–8.4)
PROT UR QL STRIP: ABNORMAL
RBC # BLD AUTO: 3.89 M/UL (ref 4.6–6.2)
RBC #/AREA URNS HPF: 5 /HPF (ref 0–4)
SODIUM SERPL-SCNC: 138 MMOL/L (ref 136–145)
SP GR UR STRIP: >=1.03 (ref 1–1.03)
TOXICOLOGY INFORMATION: NORMAL
URN SPEC COLLECT METH UR: ABNORMAL
UROBILINOGEN UR STRIP-ACNC: NEGATIVE EU/DL
WBC # BLD AUTO: 6.88 K/UL (ref 3.9–12.7)
WBC #/AREA URNS HPF: 5 /HPF (ref 0–5)

## 2019-03-29 PROCEDURE — 93005 ELECTROCARDIOGRAM TRACING: CPT

## 2019-03-29 PROCEDURE — 80053 COMPREHEN METABOLIC PANEL: CPT

## 2019-03-29 PROCEDURE — 85025 COMPLETE CBC W/AUTO DIFF WBC: CPT

## 2019-03-29 PROCEDURE — 93010 EKG 12-LEAD: ICD-10-PCS | Mod: ,,, | Performed by: INTERNAL MEDICINE

## 2019-03-29 PROCEDURE — 83690 ASSAY OF LIPASE: CPT

## 2019-03-29 PROCEDURE — 99285 EMERGENCY DEPT VISIT HI MDM: CPT | Mod: 25

## 2019-03-29 PROCEDURE — 93010 ELECTROCARDIOGRAM REPORT: CPT | Mod: ,,, | Performed by: INTERNAL MEDICINE

## 2019-03-29 PROCEDURE — 25000003 PHARM REV CODE 250: Performed by: EMERGENCY MEDICINE

## 2019-03-29 PROCEDURE — 81000 URINALYSIS NONAUTO W/SCOPE: CPT

## 2019-03-29 PROCEDURE — 80307 DRUG TEST PRSMV CHEM ANLYZR: CPT

## 2019-03-29 PROCEDURE — 96360 HYDRATION IV INFUSION INIT: CPT

## 2019-03-29 RX ORDER — ONDANSETRON 4 MG/1
4 TABLET, FILM COATED ORAL EVERY 6 HOURS
Qty: 12 TABLET | Refills: 0 | Status: SHIPPED | OUTPATIENT
Start: 2019-03-29 | End: 2020-04-25 | Stop reason: CLARIF

## 2019-03-29 RX ADMIN — SODIUM CHLORIDE 1000 ML: 0.9 INJECTION, SOLUTION INTRAVENOUS at 07:03

## 2019-03-30 NOTE — ED PROVIDER NOTES
SCRIBE #1 NOTE: I, Analisa Sanchez, am scribing for, and in the presence of, Mirta Mustafa MD. I have scribed the entire note.      History      Chief Complaint   Patient presents with    Abdominal Pain     NV       Review of patient's allergies indicates:  No Known Allergies     HPI   HPI    3/29/2019, 7:00 PM   History obtained from the patient   History limited- patient is a poor historian.      History of Present Illness: Maureen Luther is a 58 y.o. male patient who presents to the Emergency Department for evaluation of generalized abdominal pain which onset gradually today. Symptoms are constant and moderate in severity. No mitigating or exacerbating factors reported. Associated sxs include n/v. Patient also c/o R hand pain which onset suddenly today after he punched a brick wall. Patient denies any fever, chills, SOB, diaphoresis, CP, constipation, dysuria, hematuria, and all other sxs at this time. No prior Tx reported. Patient is a smoker. No further complaints or concerns at this time. Patient is a poor historian.         Arrival mode: EMS    PCP: Primary Doctor No       Past Medical History:  Past Medical History:   Diagnosis Date    Facial fracture     Medical history non-contributory     Scrotal abscess        Past Surgical History:  Past Surgical History:   Procedure Laterality Date    None      ORBITAL RECONSTRUCTION      SCROTAL SURGERY           Family History:  Family History   Problem Relation Age of Onset    Hypertension Unknown        Social History:  Social History     Tobacco Use    Smoking status: Current Every Day Smoker     Packs/day: 1.00     Types: Cigarettes   Substance and Sexual Activity    Alcohol use: No    Drug use: No    Sexual activity: unknown       ROS   Review of Systems   Constitutional: Negative for activity change, chills, diaphoresis and fever.   HENT: Negative for congestion, rhinorrhea, sneezing, sore throat and trouble swallowing.    Eyes: Negative  for pain.   Respiratory: Negative for cough, chest tightness, shortness of breath, wheezing and stridor.    Cardiovascular: Negative for chest pain, palpitations and leg swelling.   Gastrointestinal: Positive for abdominal pain, nausea and vomiting. Negative for abdominal distention, constipation and diarrhea.   Genitourinary: Negative for difficulty urinating, dysuria, frequency, hematuria and urgency.   Musculoskeletal: Negative for arthralgias, back pain, myalgias, neck pain and neck stiffness.        (+) R hand pain   Skin: Negative for pallor, rash and wound.   Neurological: Negative for dizziness, syncope, weakness, light-headedness, numbness and headaches.   Hematological: Does not bruise/bleed easily.   All other systems reviewed and are negative.    Physical Exam      Initial Vitals [03/29/19 1831]   BP Pulse Resp Temp SpO2   119/80 88 16 98.3 °F (36.8 °C) 100 %      MAP       --          Physical Exam  Nursing Notes and Vital Signs Reviewed.  Constitutional: Patient is in mild distress. Well-developed and well-nourished.  Head: Atraumatic. Normocephalic.  Eyes: PERRL. EOM intact. Conjunctivae are not pale. No scleral icterus.  ENT: Mucous membranes are moist. Oropharynx is clear and symmetric.    Neck: Supple. Full ROM. No lymphadenopathy.  Cardiovascular: Regular rate. Regular rhythm. No murmurs, rubs, or gallops. Distal pulses are 2+ and symmetric.  Pulmonary/Chest: No respiratory distress. Clear to auscultation bilaterally. No wheezing or rales.  Abdominal: Diffuse abdominal tenderness. Soft and non-distended. No rebound, guarding, or rigidity. Good bowel sounds.  Genitourinary: No CVA tenderness  Musculoskeletal: Swelling and tenderness to R thenar eminence. Moves all extremities. No obvious deformities. No edema. No calf tenderness.  Skin: Warm and dry.  Neurological:  Alert, awake, and appropriate.  Normal speech.  No acute focal neurological deficits are appreciated.  Psychiatric: Normal affect.  "Good eye contact. Appropriate in content. AAO x3.     ED Course    Procedures  ED Vital Signs:  Vitals:    03/29/19 1831 03/29/19 2032   BP: 119/80 123/79   Pulse: 88 78   Resp: 16 18   Temp: 98.3 °F (36.8 °C)    TempSrc: Oral    SpO2: 100% 100%   Weight: 66.7 kg (147 lb)    Height: 5' 8" (1.727 m)        Abnormal Lab Results:  Labs Reviewed   CBC W/ AUTO DIFFERENTIAL - Abnormal; Notable for the following components:       Result Value    RBC 3.89 (*)     Hemoglobin 12.1 (*)     Hematocrit 34.6 (*)     MCH 31.1 (*)     Lymph # 0.8 (*)     Mono # 0.2 (*)     Gran% 81.8 (*)     Lymph% 11.6 (*)     Mono% 3.5 (*)     All other components within normal limits   COMPREHENSIVE METABOLIC PANEL - Abnormal; Notable for the following components:    CO2 21 (*)     Glucose 124 (*)     All other components within normal limits   LIPASE - Abnormal; Notable for the following components:    Lipase 71 (*)     All other components within normal limits   URINALYSIS, REFLEX TO URINE CULTURE - Abnormal; Notable for the following components:    Specific Gravity, UA >=1.030 (*)     Protein, UA 2+ (*)     All other components within normal limits    Narrative:     Preferred Collection Type->Urine, Clean Catch   URINALYSIS MICROSCOPIC - Abnormal; Notable for the following components:    RBC, UA 5 (*)     All other components within normal limits    Narrative:     Preferred Collection Type->Urine, Clean Catch   DRUG SCREEN PANEL, URINE EMERGENCY    Narrative:     Preferred Collection Type->Urine, Clean Catch        All Lab Results:  Results for orders placed or performed during the hospital encounter of 03/29/19   CBC W/ AUTO DIFFERENTIAL   Result Value Ref Range    WBC 6.88 3.90 - 12.70 K/uL    RBC 3.89 (L) 4.60 - 6.20 M/uL    Hemoglobin 12.1 (L) 14.0 - 18.0 g/dL    Hematocrit 34.6 (L) 40.0 - 54.0 %    MCV 89 82 - 98 fL    MCH 31.1 (H) 27.0 - 31.0 pg    MCHC 35.0 32.0 - 36.0 g/dL    RDW 12.7 11.5 - 14.5 %    Platelets 222 150 - 350 K/uL    " MPV 9.3 9.2 - 12.9 fL    Gran # (ANC) 5.6 1.8 - 7.7 K/uL    Lymph # 0.8 (L) 1.0 - 4.8 K/uL    Mono # 0.2 (L) 0.3 - 1.0 K/uL    Eos # 0.2 0.0 - 0.5 K/uL    Baso # 0.02 0.00 - 0.20 K/uL    Gran% 81.8 (H) 38.0 - 73.0 %    Lymph% 11.6 (L) 18.0 - 48.0 %    Mono% 3.5 (L) 4.0 - 15.0 %    Eosinophil% 2.8 0.0 - 8.0 %    Basophil% 0.3 0.0 - 1.9 %    Differential Method Automated    Comp. Metabolic Panel   Result Value Ref Range    Sodium 138 136 - 145 mmol/L    Potassium 3.6 3.5 - 5.1 mmol/L    Chloride 108 95 - 110 mmol/L    CO2 21 (L) 23 - 29 mmol/L    Glucose 124 (H) 70 - 110 mg/dL    BUN, Bld 16 6 - 20 mg/dL    Creatinine 1.2 0.5 - 1.4 mg/dL    Calcium 8.8 8.7 - 10.5 mg/dL    Total Protein 7.0 6.0 - 8.4 g/dL    Albumin 3.7 3.5 - 5.2 g/dL    Total Bilirubin 0.6 0.1 - 1.0 mg/dL    Alkaline Phosphatase 76 55 - 135 U/L    AST 24 10 - 40 U/L    ALT 24 10 - 44 U/L    Anion Gap 9 8 - 16 mmol/L    eGFR if African American >60 >60 mL/min/1.73 m^2    eGFR if non African American >60 >60 mL/min/1.73 m^2   Lipase   Result Value Ref Range    Lipase 71 (H) 4 - 60 U/L   Urinalysis, Reflex to Urine Culture Urine, Clean Catch   Result Value Ref Range    Specimen UA Urine, Clean Catch     Color, UA Yellow Yellow, Straw, Mariana    Appearance, UA Clear Clear    pH, UA 6.0 5.0 - 8.0    Specific Gravity, UA >=1.030 (A) 1.005 - 1.030    Protein, UA 2+ (A) Negative    Glucose, UA Negative Negative    Ketones, UA Negative Negative    Bilirubin (UA) Negative Negative    Occult Blood UA Negative Negative    Nitrite, UA Negative Negative    Urobilinogen, UA Negative <2.0 EU/dL    Leukocytes, UA Negative Negative   Drug screen panel, emergency   Result Value Ref Range    Benzodiazepines Negative     Methadone metabolites Negative     Cocaine (Metab.) Presumptive Positive     Opiate Scrn, Ur Negative     Barbiturate Screen, Ur Negative     Amphetamine Screen, Ur Negative     THC Presumptive Positive     Phencyclidine Negative     Creatinine, Random Ur  292.4 23.0 - 375.0 mg/dL    Toxicology Information SEE COMMENT    Urinalysis Microscopic   Result Value Ref Range    RBC, UA 5 (H) 0 - 4 /hpf    WBC, UA 5 0 - 5 /hpf    Bacteria, UA Occasional None-Occ /hpf    Hyaline Casts, UA 0 0-1/lpf /lpf    Microscopic Comment SEE COMMENT          Imaging Results:  Imaging Results          X-Ray Hand 3 view Right (Final result)  Result time 03/29/19 19:59:37    Final result by Solomon Tristan III, MD (03/29/19 19:59:37)                 Impression:      See above      Electronically signed by: Solomon Tristan MD  Date:    03/29/2019  Time:    19:59             Narrative:    EXAMINATION:  XR HAND COMPLETE 3 VIEW RIGHT    CLINICAL HISTORY:  right hand pain;    FINDINGS:  There is a possible age indeterminate nondisplaced fracture of the 5th metacarpal base suspected to be chronic.  No other evidence of fracture.  Joint alignment is anatomic.  No advanced arthritic changes evident.                               X-Ray Abdomen Flat And Erect (Final result)  Result time 03/29/19 20:04:41    Final result by Solomon Tristan III, MD (03/29/19 20:04:41)                 Impression:      No radiographic evidence of acute abdominal disease.      Electronically signed by: Solomon Tristan MD  Date:    03/29/2019  Time:    20:04             Narrative:    EXAMINATION:  XR ABDOMEN FLAT AND ERECT    CLINICAL HISTORY:  Abdominal Pain;    FINDINGS:  No definite free air is seen.  There is a small amount of scattered stool within a nondilated colon.  Narrow gas-filled loops of left colon without significant dilation.  Bowel gas pattern is otherwise unremarkable.  No evidence of small-bowel obstruction.  There is a stable round peripherally calcified splenic lesion.  No radiographic evidence of urolithiasis.                               The EKG was ordered, reviewed, and independently interpreted by the ED provider.  Interpretation time: 19:09  Rate: 78 BPM  Rhythm: normal sinus rhythm  Interpretation:  Possible LAE. Septal infarct. No STEMI.           The Emergency Provider reviewed the vital signs and test results, which are outlined above.    ED Discussion     8:24 PM: Reassessed pt at this time. Pt states his condition has improved at this time. Patient is able to tolerate PO intake. Discussed with pt all pertinent ED information and results. Discussed pt dx and plan of tx. Gave pt all f/u and return to the ED instructions. All questions and concerns were addressed at this time. Pt expresses understanding of information and instructions, and is comfortable with plan to discharge. Pt is stable for discharge.    I discussed with patient and/or family/caretaker that evaluation in the ED does not suggest any emergent or life threatening medical conditions requiring immediate intervention beyond what was provided in the ED, and I believe patient is safe for discharge.  Regardless, an unremarkable evaluation in the ED does not preclude the development or presence of a serious of life threatening condition. As such, patient was instructed to return immediately for any worsening or change in current symptoms.      ED Medication(s):  Medications   sodium chloride 0.9% bolus 1,000 mL (0 mLs Intravenous Stopped 3/29/19 2025)       Follow-up Information     [facility]. Schedule an appointment as soon as possible for a visit in 2 days.    Why:  Return to the Emergency Room, If symptoms worsen  Contact information:  [address] [facility] [id number] [phone]                   Current Discharge Medication List      START taking these medications    Details   ondansetron (ZOFRAN) 4 MG tablet Take 1 tablet (4 mg total) by mouth every 6 (six) hours.  Qty: 12 tablet, Refills: 0             Medical Decision Making    Medical Decision Making:   Clinical Tests:   Lab Tests: Ordered and Reviewed  Radiological Study: Ordered and Reviewed  Medical Tests: Ordered and Reviewed     Additional MDM:   Smoking  Cessation: The patient is a smoker. The patient was counseled on smoking cessation for: 3 minutes. The patient was counseled on tobacco related  health complications.        Scribe Attestation:   Scribe #1: I performed the above scribed service and the documentation accurately describes the services I performed. I attest to the accuracy of the note.    Attending:   Physician Attestation Statement for Scribe #1: I, Mirta Mustafa MD, personally performed the services described in this documentation, as scribed by Analisa Sanchez, in my presence, and it is both accurate and complete.          Clinical Impression       ICD-10-CM ICD-9-CM   1. Non-intractable vomiting with nausea, unspecified vomiting type R11.2 787.01   2. Tobacco abuse disorder Z72.0 305.1   3. Substance abuse F19.10 305.90       Disposition:   Disposition: Discharged  Condition: Stable         Mirta Mustafa MD  03/30/19 0052

## 2019-08-14 ENCOUNTER — TELEPHONE (OUTPATIENT)
Dept: HEMATOLOGY/ONCOLOGY | Facility: CLINIC | Age: 59
End: 2019-08-14

## 2019-08-14 NOTE — TELEPHONE ENCOUNTER
Left message with patients girlfriend Naya regarding pt need to contact his PCP for appt. To take care of his medical needs as we no longer have any appointment availability for his type of insurance. She stated understanding and verified intent to share the message with the patient .

## 2019-08-14 NOTE — TELEPHONE ENCOUNTER
Rec'd voicemail from pt. Initially none of the SW team could identify the patient, but upon call back (phone number ([phone]), pt identified himself. It is unclear as to how he got connected to SW, but he describes being desperate for help. 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 SW is unsure as to how patient can go about doing this, so will enlist the help of nurse navigators. Either nurse navigators or SW will f/u with pt to try and assist him.

## 2020-03-09 ENCOUNTER — HOSPITAL ENCOUNTER (INPATIENT)
Facility: HOSPITAL | Age: 60
LOS: 3 days | Discharge: HOME | DRG: 208 | End: 2020-03-12
Attending: EMERGENCY MEDICINE | Admitting: INTERNAL MEDICINE
Payer: MEDICAID

## 2020-03-09 DIAGNOSIS — Z72.0 TOBACCO ABUSE: Chronic | ICD-10-CM

## 2020-03-09 DIAGNOSIS — J96.01 ACUTE RESPIRATORY FAILURE WITH HYPOXIA AND HYPERCAPNIA: ICD-10-CM

## 2020-03-09 DIAGNOSIS — R06.00 DYSPNEA: ICD-10-CM

## 2020-03-09 DIAGNOSIS — F14.10 COCAINE ABUSE: Chronic | ICD-10-CM

## 2020-03-09 DIAGNOSIS — J45.51 SEVERE PERSISTENT ASTHMA WITH EXACERBATION: ICD-10-CM

## 2020-03-09 DIAGNOSIS — R06.02 SHORTNESS OF BREATH: ICD-10-CM

## 2020-03-09 DIAGNOSIS — I10 UNCONTROLLED HYPERTENSION: ICD-10-CM

## 2020-03-09 DIAGNOSIS — J96.02 ACUTE RESPIRATORY FAILURE WITH HYPOXIA AND HYPERCAPNIA: ICD-10-CM

## 2020-03-09 DIAGNOSIS — J45.901 SEVERE ASTHMA WITH ACUTE EXACERBATION, UNSPECIFIED WHETHER PERSISTENT: ICD-10-CM

## 2020-03-09 DIAGNOSIS — J96.00 ACUTE RESPIRATORY FAILURE, UNSPECIFIED WHETHER WITH HYPOXIA OR HYPERCAPNIA: Primary | ICD-10-CM

## 2020-03-09 LAB
ALBUMIN SERPL BCP-MCNC: 4.2 G/DL (ref 3.5–5.2)
ALP SERPL-CCNC: 86 U/L (ref 55–135)
ALT SERPL W/O P-5'-P-CCNC: 28 U/L (ref 10–44)
ANION GAP SERPL CALC-SCNC: 7 MMOL/L (ref 8–16)
AST SERPL-CCNC: 26 U/L (ref 10–40)
BASOPHILS # BLD AUTO: 0.08 K/UL (ref 0–0.2)
BASOPHILS NFR BLD: 0.9 % (ref 0–1.9)
BILIRUB SERPL-MCNC: 0.4 MG/DL (ref 0.1–1)
BNP SERPL-MCNC: 19 PG/ML (ref 0–99)
BUN SERPL-MCNC: 12 MG/DL (ref 6–20)
CALCIUM SERPL-MCNC: 10.1 MG/DL (ref 8.7–10.5)
CHLORIDE SERPL-SCNC: 106 MMOL/L (ref 95–110)
CO2 SERPL-SCNC: 27 MMOL/L (ref 23–29)
CREAT SERPL-MCNC: 1.1 MG/DL (ref 0.5–1.4)
DIFFERENTIAL METHOD: ABNORMAL
EOSINOPHIL # BLD AUTO: 1.9 K/UL (ref 0–0.5)
EOSINOPHIL NFR BLD: 21.5 % (ref 0–8)
ERYTHROCYTE [DISTWIDTH] IN BLOOD BY AUTOMATED COUNT: 11.9 % (ref 11.5–14.5)
EST. GFR  (AFRICAN AMERICAN): >60 ML/MIN/1.73 M^2
EST. GFR  (NON AFRICAN AMERICAN): >60 ML/MIN/1.73 M^2
GLUCOSE SERPL-MCNC: 92 MG/DL (ref 70–110)
HCT VFR BLD AUTO: 44.3 % (ref 40–54)
HGB BLD-MCNC: 14.7 G/DL (ref 14–18)
IMM GRANULOCYTES # BLD AUTO: 0.03 K/UL (ref 0–0.04)
IMM GRANULOCYTES NFR BLD AUTO: 0.3 % (ref 0–0.5)
INFLUENZA A, MOLECULAR: NEGATIVE
INFLUENZA B, MOLECULAR: NEGATIVE
LYMPHOCYTES # BLD AUTO: 1.7 K/UL (ref 1–4.8)
LYMPHOCYTES NFR BLD: 19.4 % (ref 18–48)
MCH RBC QN AUTO: 30.6 PG (ref 27–31)
MCHC RBC AUTO-ENTMCNC: 33.2 G/DL (ref 32–36)
MCV RBC AUTO: 92 FL (ref 82–98)
MONOCYTES # BLD AUTO: 0.4 K/UL (ref 0.3–1)
MONOCYTES NFR BLD: 4 % (ref 4–15)
NEUTROPHILS # BLD AUTO: 4.8 K/UL (ref 1.8–7.7)
NEUTROPHILS NFR BLD: 53.9 % (ref 38–73)
NRBC BLD-RTO: 0 /100 WBC
PLATELET # BLD AUTO: 292 K/UL (ref 150–350)
PMV BLD AUTO: 9 FL (ref 9.2–12.9)
POTASSIUM SERPL-SCNC: 4.4 MMOL/L (ref 3.5–5.1)
PROT SERPL-MCNC: 7.8 G/DL (ref 6–8.4)
RBC # BLD AUTO: 4.81 M/UL (ref 4.6–6.2)
SODIUM SERPL-SCNC: 140 MMOL/L (ref 136–145)
SPECIMEN SOURCE: NORMAL
TROPONIN I SERPL DL<=0.01 NG/ML-MCNC: 0.01 NG/ML (ref 0–0.03)
WBC # BLD AUTO: 8.9 K/UL (ref 3.9–12.7)

## 2020-03-09 PROCEDURE — 94660 CPAP INITIATION&MGMT: CPT

## 2020-03-09 PROCEDURE — 87502 INFLUENZA DNA AMP PROBE: CPT

## 2020-03-09 PROCEDURE — 93005 ELECTROCARDIOGRAM TRACING: CPT

## 2020-03-09 PROCEDURE — 96372 THER/PROPH/DIAG INJ SC/IM: CPT

## 2020-03-09 PROCEDURE — 99291 CRITICAL CARE FIRST HOUR: CPT | Mod: 25

## 2020-03-09 PROCEDURE — 51702 INSERT TEMP BLADDER CATH: CPT

## 2020-03-09 PROCEDURE — 63600175 PHARM REV CODE 636 W HCPCS: Performed by: EMERGENCY MEDICINE

## 2020-03-09 PROCEDURE — 94640 AIRWAY INHALATION TREATMENT: CPT

## 2020-03-09 PROCEDURE — 25000242 PHARM REV CODE 250 ALT 637 W/ HCPCS: Performed by: EMERGENCY MEDICINE

## 2020-03-09 PROCEDURE — 84484 ASSAY OF TROPONIN QUANT: CPT

## 2020-03-09 PROCEDURE — 93010 EKG 12-LEAD: ICD-10-PCS | Mod: ,,, | Performed by: INTERNAL MEDICINE

## 2020-03-09 PROCEDURE — 96375 TX/PRO/DX INJ NEW DRUG ADDON: CPT

## 2020-03-09 PROCEDURE — 27000190 HC CPAP FULL FACE MASK W/VALVE

## 2020-03-09 PROCEDURE — 83880 ASSAY OF NATRIURETIC PEPTIDE: CPT

## 2020-03-09 PROCEDURE — 85025 COMPLETE CBC W/AUTO DIFF WBC: CPT

## 2020-03-09 PROCEDURE — 11000001 HC ACUTE MED/SURG PRIVATE ROOM

## 2020-03-09 PROCEDURE — 25000003 PHARM REV CODE 250: Performed by: EMERGENCY MEDICINE

## 2020-03-09 PROCEDURE — 96374 THER/PROPH/DIAG INJ IV PUSH: CPT

## 2020-03-09 PROCEDURE — 80053 COMPREHEN METABOLIC PANEL: CPT

## 2020-03-09 PROCEDURE — 99900035 HC TECH TIME PER 15 MIN (STAT)

## 2020-03-09 PROCEDURE — 93010 ELECTROCARDIOGRAM REPORT: CPT | Mod: ,,, | Performed by: INTERNAL MEDICINE

## 2020-03-09 PROCEDURE — 31500 INSERT EMERGENCY AIRWAY: CPT

## 2020-03-09 PROCEDURE — 36415 COLL VENOUS BLD VENIPUNCTURE: CPT

## 2020-03-09 RX ORDER — IPRATROPIUM BROMIDE AND ALBUTEROL SULFATE 2.5; .5 MG/3ML; MG/3ML
3 SOLUTION RESPIRATORY (INHALATION)
Status: COMPLETED | OUTPATIENT
Start: 2020-03-09 | End: 2020-03-09

## 2020-03-09 RX ORDER — TERBUTALINE SULFATE 1 MG/ML
0.25 INJECTION SUBCUTANEOUS ONCE
Status: COMPLETED | OUTPATIENT
Start: 2020-03-10 | End: 2020-03-09

## 2020-03-09 RX ORDER — HYDRALAZINE HYDROCHLORIDE 20 MG/ML
20 INJECTION INTRAMUSCULAR; INTRAVENOUS
Status: COMPLETED | OUTPATIENT
Start: 2020-03-09 | End: 2020-03-09

## 2020-03-09 RX ORDER — PREDNISONE 20 MG/1
60 TABLET ORAL
Status: COMPLETED | OUTPATIENT
Start: 2020-03-09 | End: 2020-03-09

## 2020-03-09 RX ADMIN — IPRATROPIUM BROMIDE AND ALBUTEROL SULFATE 3 ML: .5; 2.5 SOLUTION RESPIRATORY (INHALATION) at 10:03

## 2020-03-09 RX ADMIN — NITROGLYCERIN 1 INCH: 20 OINTMENT TOPICAL at 11:03

## 2020-03-09 RX ADMIN — TERBUTALINE SULFATE 0.25 MG: 1 INJECTION, SOLUTION SUBCUTANEOUS at 11:03

## 2020-03-09 RX ADMIN — HYDRALAZINE HYDROCHLORIDE 20 MG: 20 INJECTION INTRAMUSCULAR; INTRAVENOUS at 11:03

## 2020-03-09 RX ADMIN — PREDNISONE 60 MG: 20 TABLET ORAL at 11:03

## 2020-03-10 PROBLEM — J96.02 ACUTE RESPIRATORY FAILURE WITH HYPERCAPNIA: Status: ACTIVE | Noted: 2020-03-10

## 2020-03-10 PROBLEM — F19.10 DRUG ABUSE: Status: ACTIVE | Noted: 2020-03-10

## 2020-03-10 PROBLEM — J45.901 ASTHMA WITH ACUTE EXACERBATION: Status: ACTIVE | Noted: 2020-03-10

## 2020-03-10 PROBLEM — J96.00 ACUTE RESPIRATORY FAILURE: Status: ACTIVE | Noted: 2020-03-10

## 2020-03-10 PROBLEM — J96.01 ACUTE RESPIRATORY FAILURE WITH HYPOXIA AND HYPERCAPNIA: Status: ACTIVE | Noted: 2020-03-10

## 2020-03-10 PROBLEM — I10 UNCONTROLLED HYPERTENSION: Status: ACTIVE | Noted: 2020-03-10

## 2020-03-10 PROBLEM — F14.10 COCAINE ABUSE: Chronic | Status: ACTIVE | Noted: 2020-03-10

## 2020-03-10 LAB
ALLENS TEST: ABNORMAL
AMPHET+METHAMPHET UR QL: NEGATIVE
BARBITURATES UR QL SCN>200 NG/ML: NEGATIVE
BENZODIAZ UR QL SCN>200 NG/ML: NEGATIVE
BZE UR QL SCN: NORMAL
CANNABINOIDS UR QL SCN: NEGATIVE
CREAT UR-MCNC: 41.7 MG/DL (ref 23–375)
D DIMER PPP IA.FEU-MCNC: 1.15 MG/L FEU
DELSYS: ABNORMAL
ERYTHROCYTE [SEDIMENTATION RATE] IN BLOOD BY WESTERGREN METHOD: 16 MM/H
ERYTHROCYTE [SEDIMENTATION RATE] IN BLOOD BY WESTERGREN METHOD: 18 MM/H
ERYTHROCYTE [SEDIMENTATION RATE] IN BLOOD BY WESTERGREN METHOD: 24 MM/H
FIO2: 100
FIO2: 35
FIO2: 50
HCO3 UR-SCNC: 24.4 MMOL/L (ref 24–28)
HCO3 UR-SCNC: 28.1 MMOL/L (ref 24–28)
HCO3 UR-SCNC: 30.3 MMOL/L (ref 24–28)
METHADONE UR QL SCN>300 NG/ML: NEGATIVE
MODE: ABNORMAL
OPIATES UR QL SCN: NORMAL
PCO2 BLDA: 42.5 MMHG (ref 35–45)
PCO2 BLDA: 72.6 MMHG (ref 35–45)
PCO2 BLDA: 85.9 MMHG (ref 35–45)
PCP UR QL SCN>25 NG/ML: NEGATIVE
PEEP: 5
PH SMN: 7.16 [PH] (ref 7.35–7.45)
PH SMN: 7.2 [PH] (ref 7.35–7.45)
PH SMN: 7.37 [PH] (ref 7.35–7.45)
PO2 BLDA: 147 MMHG (ref 80–100)
PO2 BLDA: 616 MMHG (ref 80–100)
PO2 BLDA: 68 MMHG (ref 80–100)
POC BE: -1 MMOL/L
POC BE: 0 MMOL/L
POC BE: 1 MMOL/L
POC SATURATED O2: 100 % (ref 95–100)
POC SATURATED O2: 93 % (ref 95–100)
POC SATURATED O2: 99 % (ref 95–100)
SAMPLE: ABNORMAL
SITE: ABNORMAL
TOXICOLOGY INFORMATION: NORMAL
VT: 400
VT: 400

## 2020-03-10 PROCEDURE — 27201258 HC MOISTURE TRAP-END TIDAL C02

## 2020-03-10 PROCEDURE — 94003 VENT MGMT INPAT SUBQ DAY: CPT

## 2020-03-10 PROCEDURE — 97802 MEDICAL NUTRITION INDIV IN: CPT

## 2020-03-10 PROCEDURE — 36600 WITHDRAWAL OF ARTERIAL BLOOD: CPT

## 2020-03-10 PROCEDURE — 63600175 PHARM REV CODE 636 W HCPCS: Performed by: EMERGENCY MEDICINE

## 2020-03-10 PROCEDURE — 63600175 PHARM REV CODE 636 W HCPCS: Performed by: INTERNAL MEDICINE

## 2020-03-10 PROCEDURE — 63600175 PHARM REV CODE 636 W HCPCS: Performed by: NURSE PRACTITIONER

## 2020-03-10 PROCEDURE — 27200966 HC CLOSED SUCTION SYSTEM

## 2020-03-10 PROCEDURE — 99291 PR CRITICAL CARE, E/M 30-74 MINUTES: ICD-10-PCS | Mod: ,,, | Performed by: NURSE PRACTITIONER

## 2020-03-10 PROCEDURE — 94002 VENT MGMT INPAT INIT DAY: CPT

## 2020-03-10 PROCEDURE — 80307 DRUG TEST PRSMV CHEM ANLYZR: CPT

## 2020-03-10 PROCEDURE — 20000000 HC ICU ROOM

## 2020-03-10 PROCEDURE — 27100108

## 2020-03-10 PROCEDURE — 94640 AIRWAY INHALATION TREATMENT: CPT

## 2020-03-10 PROCEDURE — 25000003 PHARM REV CODE 250: Performed by: NURSE PRACTITIONER

## 2020-03-10 PROCEDURE — 82803 BLOOD GASES ANY COMBINATION: CPT

## 2020-03-10 PROCEDURE — 27100132 HC NEBULIZER, FILTERED TREATMENT

## 2020-03-10 PROCEDURE — 25000242 PHARM REV CODE 250 ALT 637 W/ HCPCS: Performed by: INTERNAL MEDICINE

## 2020-03-10 PROCEDURE — 25000242 PHARM REV CODE 250 ALT 637 W/ HCPCS: Performed by: NURSE PRACTITIONER

## 2020-03-10 PROCEDURE — 27000221 HC OXYGEN, UP TO 24 HOURS

## 2020-03-10 PROCEDURE — 25000003 PHARM REV CODE 250: Performed by: EMERGENCY MEDICINE

## 2020-03-10 PROCEDURE — 63600175 PHARM REV CODE 636 W HCPCS

## 2020-03-10 PROCEDURE — 99900035 HC TECH TIME PER 15 MIN (STAT)

## 2020-03-10 PROCEDURE — S0028 INJECTION, FAMOTIDINE, 20 MG: HCPCS | Performed by: NURSE PRACTITIONER

## 2020-03-10 PROCEDURE — 85379 FIBRIN DEGRADATION QUANT: CPT

## 2020-03-10 PROCEDURE — 25500020 PHARM REV CODE 255: Performed by: INTERNAL MEDICINE

## 2020-03-10 PROCEDURE — 99291 CRITICAL CARE FIRST HOUR: CPT | Mod: ,,, | Performed by: NURSE PRACTITIONER

## 2020-03-10 RX ORDER — ATENOLOL 50 MG/1
TABLET ORAL
COMMUNITY
Start: 2020-03-09 | End: 2023-08-30

## 2020-03-10 RX ORDER — HYDROCHLOROTHIAZIDE 12.5 MG/1
TABLET ORAL
COMMUNITY
Start: 2020-03-06 | End: 2023-08-30

## 2020-03-10 RX ORDER — PROPOFOL 10 MG/ML
20 INJECTION, EMULSION INTRAVENOUS CONTINUOUS
Status: DISCONTINUED | OUTPATIENT
Start: 2020-03-10 | End: 2020-03-10

## 2020-03-10 RX ORDER — NITROGLYCERIN 20 MG/100ML
5 INJECTION INTRAVENOUS CONTINUOUS
Status: DISCONTINUED | OUTPATIENT
Start: 2020-03-10 | End: 2020-03-10

## 2020-03-10 RX ORDER — SODIUM CHLORIDE 9 MG/ML
INJECTION, SOLUTION INTRAVENOUS CONTINUOUS
Status: DISCONTINUED | OUTPATIENT
Start: 2020-03-10 | End: 2020-03-12 | Stop reason: HOSPADM

## 2020-03-10 RX ORDER — PROPOFOL 10 MG/ML
5 INJECTION, EMULSION INTRAVENOUS CONTINUOUS
Status: DISCONTINUED | OUTPATIENT
Start: 2020-03-10 | End: 2020-03-10

## 2020-03-10 RX ORDER — KETAMINE HYDROCHLORIDE 10 MG/ML
100 INJECTION, SOLUTION INTRAMUSCULAR; INTRAVENOUS
Status: COMPLETED | OUTPATIENT
Start: 2020-03-10 | End: 2020-03-10

## 2020-03-10 RX ORDER — CHLORHEXIDINE GLUCONATE ORAL RINSE 1.2 MG/ML
15 SOLUTION DENTAL 2 TIMES DAILY
Status: DISCONTINUED | OUTPATIENT
Start: 2020-03-10 | End: 2020-03-11

## 2020-03-10 RX ORDER — METHYLPREDNISOLONE SOD SUCC 125 MG
80 VIAL (EA) INJECTION EVERY 6 HOURS
Status: DISCONTINUED | OUTPATIENT
Start: 2020-03-10 | End: 2020-03-11

## 2020-03-10 RX ORDER — PREDNISONE 20 MG/1
TABLET ORAL
COMMUNITY
Start: 2020-03-09 | End: 2020-04-04 | Stop reason: SDUPTHER

## 2020-03-10 RX ORDER — ONDANSETRON 2 MG/ML
4 INJECTION INTRAMUSCULAR; INTRAVENOUS EVERY 8 HOURS PRN
Status: DISCONTINUED | OUTPATIENT
Start: 2020-03-10 | End: 2020-03-12 | Stop reason: HOSPADM

## 2020-03-10 RX ORDER — POLYETHYLENE GLYCOL 3350 17 G/17G
17 POWDER, FOR SOLUTION ORAL DAILY
Status: DISCONTINUED | OUTPATIENT
Start: 2020-03-10 | End: 2020-03-11

## 2020-03-10 RX ORDER — DOXYCYCLINE HYCLATE 100 MG
TABLET ORAL
Status: ON HOLD | COMMUNITY
Start: 2020-03-09 | End: 2020-03-12 | Stop reason: HOSPADM

## 2020-03-10 RX ORDER — NAPROXEN 500 MG/1
TABLET ORAL
COMMUNITY
Start: 2020-03-07 | End: 2023-08-30

## 2020-03-10 RX ORDER — ATROPINE SULFATE 0.1 MG/ML
0.5 INJECTION INTRAVENOUS
Status: COMPLETED | OUTPATIENT
Start: 2020-03-10 | End: 2020-03-10

## 2020-03-10 RX ORDER — ENOXAPARIN SODIUM 100 MG/ML
40 INJECTION SUBCUTANEOUS EVERY 12 HOURS
Status: DISCONTINUED | OUTPATIENT
Start: 2020-03-10 | End: 2020-03-10

## 2020-03-10 RX ORDER — TERAZOSIN 2 MG/1
CAPSULE ORAL
COMMUNITY
Start: 2020-03-09 | End: 2023-08-30

## 2020-03-10 RX ORDER — ENOXAPARIN SODIUM 100 MG/ML
40 INJECTION SUBCUTANEOUS EVERY 24 HOURS
Status: DISCONTINUED | OUTPATIENT
Start: 2020-03-10 | End: 2020-03-12 | Stop reason: HOSPADM

## 2020-03-10 RX ORDER — MORPHINE SULFATE 10 MG/ML
10 INJECTION INTRAMUSCULAR; INTRAVENOUS; SUBCUTANEOUS
Status: COMPLETED | OUTPATIENT
Start: 2020-03-10 | End: 2020-03-10

## 2020-03-10 RX ORDER — PROPOFOL 10 MG/ML
INJECTION, EMULSION INTRAVENOUS
Status: DISPENSED
Start: 2020-03-10 | End: 2020-03-10

## 2020-03-10 RX ORDER — IPRATROPIUM BROMIDE AND ALBUTEROL SULFATE 2.5; .5 MG/3ML; MG/3ML
3 SOLUTION RESPIRATORY (INHALATION) EVERY 4 HOURS
Status: DISCONTINUED | OUTPATIENT
Start: 2020-03-10 | End: 2020-03-11

## 2020-03-10 RX ORDER — BUPROPION HYDROCHLORIDE 100 MG/1
TABLET ORAL
COMMUNITY
Start: 2020-03-09 | End: 2023-08-30

## 2020-03-10 RX ORDER — SUCCINYLCHOLINE CHLORIDE 20 MG/ML
75 INJECTION INTRAMUSCULAR; INTRAVENOUS
Status: COMPLETED | OUTPATIENT
Start: 2020-03-10 | End: 2020-03-10

## 2020-03-10 RX ORDER — ALBUTEROL SULFATE 90 UG/1
2 AEROSOL, METERED RESPIRATORY (INHALATION) EVERY 4 HOURS PRN
COMMUNITY
Start: 2020-03-09 | End: 2023-09-15 | Stop reason: SDUPTHER

## 2020-03-10 RX ORDER — BISACODYL 10 MG
10 SUPPOSITORY, RECTAL RECTAL DAILY PRN
Status: DISCONTINUED | OUTPATIENT
Start: 2020-03-10 | End: 2020-03-12 | Stop reason: HOSPADM

## 2020-03-10 RX ORDER — ARFORMOTEROL TARTRATE 15 UG/2ML
15 SOLUTION RESPIRATORY (INHALATION) 2 TIMES DAILY
Status: DISCONTINUED | OUTPATIENT
Start: 2020-03-10 | End: 2020-03-12 | Stop reason: HOSPADM

## 2020-03-10 RX ORDER — PROPOFOL 10 MG/ML
20 INJECTION, EMULSION INTRAVENOUS CONTINUOUS
Status: DISCONTINUED | OUTPATIENT
Start: 2020-03-10 | End: 2020-03-11

## 2020-03-10 RX ORDER — FAMOTIDINE 10 MG/ML
20 INJECTION INTRAVENOUS 2 TIMES DAILY
Status: DISCONTINUED | OUTPATIENT
Start: 2020-03-10 | End: 2020-03-11

## 2020-03-10 RX ORDER — BUDESONIDE 0.5 MG/2ML
0.5 INHALANT ORAL EVERY 12 HOURS
Status: DISCONTINUED | OUTPATIENT
Start: 2020-03-10 | End: 2020-03-12 | Stop reason: HOSPADM

## 2020-03-10 RX ADMIN — METHYLPREDNISOLONE SODIUM SUCCINATE 80 MG: 125 INJECTION, POWDER, FOR SOLUTION INTRAMUSCULAR; INTRAVENOUS at 01:03

## 2020-03-10 RX ADMIN — ENOXAPARIN SODIUM 40 MG: 100 INJECTION SUBCUTANEOUS at 06:03

## 2020-03-10 RX ADMIN — METHYLPREDNISOLONE SODIUM SUCCINATE 80 MG: 125 INJECTION, POWDER, FOR SOLUTION INTRAMUSCULAR; INTRAVENOUS at 06:03

## 2020-03-10 RX ADMIN — PROPOFOL 5 MCG/KG/MIN: 10 INJECTION, EMULSION INTRAVENOUS at 12:03

## 2020-03-10 RX ADMIN — CEFTRIAXONE 2 G: 2 INJECTION, SOLUTION INTRAVENOUS at 09:03

## 2020-03-10 RX ADMIN — IPRATROPIUM BROMIDE AND ALBUTEROL SULFATE 3 ML: .5; 3 SOLUTION RESPIRATORY (INHALATION) at 11:03

## 2020-03-10 RX ADMIN — SODIUM CHLORIDE 500 ML: 0.9 INJECTION, SOLUTION INTRAVENOUS at 06:03

## 2020-03-10 RX ADMIN — SODIUM CHLORIDE 500 ML: 0.9 INJECTION, SOLUTION INTRAVENOUS at 01:03

## 2020-03-10 RX ADMIN — KETAMINE HYDROCHLORIDE 100 MG: 10 INJECTION INTRAMUSCULAR; INTRAVENOUS at 12:03

## 2020-03-10 RX ADMIN — BUDESONIDE 0.5 MG: 0.5 SUSPENSION RESPIRATORY (INHALATION) at 07:03

## 2020-03-10 RX ADMIN — IPRATROPIUM BROMIDE AND ALBUTEROL SULFATE 3 ML: .5; 3 SOLUTION RESPIRATORY (INHALATION) at 07:03

## 2020-03-10 RX ADMIN — IPRATROPIUM BROMIDE AND ALBUTEROL SULFATE 3 ML: .5; 3 SOLUTION RESPIRATORY (INHALATION) at 04:03

## 2020-03-10 RX ADMIN — LORAZEPAM 2 MG: 2 INJECTION INTRAMUSCULAR; INTRAVENOUS at 12:03

## 2020-03-10 RX ADMIN — CHLORHEXIDINE GLUCONATE 0.12% ORAL RINSE 15 ML: 1.2 LIQUID ORAL at 09:03

## 2020-03-10 RX ADMIN — PROPOFOL 35 MCG/KG/MIN: 10 INJECTION, EMULSION INTRAVENOUS at 11:03

## 2020-03-10 RX ADMIN — PROPOFOL 20 MCG/KG/MIN: 10 INJECTION, EMULSION INTRAVENOUS at 04:03

## 2020-03-10 RX ADMIN — PROPOFOL 10 MCG/KG/MIN: 10 INJECTION, EMULSION INTRAVENOUS at 03:03

## 2020-03-10 RX ADMIN — METHYLPREDNISOLONE SODIUM SUCCINATE 80 MG: 125 INJECTION, POWDER, FOR SOLUTION INTRAMUSCULAR; INTRAVENOUS at 11:03

## 2020-03-10 RX ADMIN — IOHEXOL 100 ML: 350 INJECTION, SOLUTION INTRAVENOUS at 03:03

## 2020-03-10 RX ADMIN — LORAZEPAM 3 MG/HR: 2 INJECTION INTRAMUSCULAR; INTRAVENOUS at 01:03

## 2020-03-10 RX ADMIN — METHYLPREDNISOLONE SODIUM SUCCINATE 80 MG: 125 INJECTION, POWDER, FOR SOLUTION INTRAMUSCULAR; INTRAVENOUS at 09:03

## 2020-03-10 RX ADMIN — FAMOTIDINE 20 MG: 10 INJECTION INTRAVENOUS at 09:03

## 2020-03-10 RX ADMIN — LORAZEPAM 2 MG: 2 INJECTION INTRAMUSCULAR; INTRAVENOUS at 10:03

## 2020-03-10 RX ADMIN — ARFORMOTEROL TARTRATE 15 MCG: 15 SOLUTION RESPIRATORY (INHALATION) at 07:03

## 2020-03-10 RX ADMIN — SUCCINYLCHOLINE CHLORIDE 76 MG: 20 INJECTION, SOLUTION INTRAMUSCULAR; INTRAVENOUS; PARENTERAL at 12:03

## 2020-03-10 RX ADMIN — LORAZEPAM 2 MG: 2 INJECTION INTRAMUSCULAR; INTRAVENOUS at 01:03

## 2020-03-10 RX ADMIN — MORPHINE SULFATE 10 MG: 10 INJECTION, SOLUTION INTRAMUSCULAR; INTRAVENOUS at 12:03

## 2020-03-10 RX ADMIN — IPRATROPIUM BROMIDE AND ALBUTEROL SULFATE 3 ML: .5; 3 SOLUTION RESPIRATORY (INHALATION) at 03:03

## 2020-03-10 RX ADMIN — LORAZEPAM 1 MG: 2 INJECTION INTRAMUSCULAR; INTRAVENOUS at 12:03

## 2020-03-10 RX ADMIN — SODIUM CHLORIDE: 0.9 INJECTION, SOLUTION INTRAVENOUS at 11:03

## 2020-03-10 RX ADMIN — ATROPINE SULFATE 0.5 MG: 0.1 INJECTION PARENTERAL at 12:03

## 2020-03-10 RX ADMIN — POLYETHYLENE GLYCOL (3350) 17 G: 17 POWDER, FOR SOLUTION ORAL at 09:03

## 2020-03-10 NOTE — ED NOTES
"Pt AAOx3, resting in bed, side rails up x 2, call bell within reach, on BiPAP displaying sign of acute distress as pt was hyperventilating. Pt instructed to calm down and take slow regular breaths. An explanation was offers as to why he need to relax and let the BiPAP take effect. Pt continually stated "I cant breathe" while pulling off bipap. Dr Brink was notified. MD spoke to pt offering comfort inorder have the pt relax and take slower calmer breaths . When MD was unable to do so. MD then explained that the way the pt was breathing would not allow him to ventilate properly and would resulted in the pt being intubated. Pt continued to hyperventilate and stating that "he couldn't breath". Order receive for pt to become a critical and to prepare for intubation.   "

## 2020-03-10 NOTE — CONSULTS
"  [facility] -   Adult Nutrition  Consult Note    SUMMARY     Recommendations    Recommendation: 1. Rec'd TF of peptamen intense VHP @ 55 ml/hr. With current propofol infusion, provides 1666 kcal, 122 g pro and 1109 ml free water. Flushes per MD/NP. 2. RD to f/u.   Goals: Meet > 85 % EEN/EPN by RD f/u   Nutrition Goal Status: new  Communication of RD Recs: (POC, sticky note, reviewed with NP)    Reason for Assessment    Reason For Assessment: consult  Diagnosis: (Acute respiratory failure )  Relevant Medical History: Facial fracture  Interdisciplinary Rounds: attended  General Information Comments: Pt currently in ICU, intubated and sedated w/ propofol. Per epic records, pt weighed 147 lbs on 3/29/29, current wt =159 lbs (no wt loss). Per NFPE 3/10/20, no significant muscle wasting/ fat depletion noted. Reviewed TF recs w/ NP during rounds this AM.   Nutrition Discharge Planning: pending medical course    Nutrition Risk Screen    Nutrition Risk Screen: other (see comments)(intubated)    Nutrition/Diet History    Spiritual, Cultural Beliefs, Catholic Practices, Values that Affect Care: no    Anthropometrics    Temp: 97.4 °F (36.3 °C)  Height Method: Stated  Height: 5' 9" (175.3 cm)  Height (inches): 69 in  Weight Method: Bed Scale  Weight: 72.2 kg (159 lb 2.8 oz)  Weight (lb): 159.17 lb  Ideal Body Weight (IBW), Male: 160 lb  % Ideal Body Weight, Male (lb): 99.48 %  BMI (Calculated): 23.5  BMI Grade: 18.5-24.9 - normal       Lab/Procedures/Meds    Pertinent Labs Reviewed: reviewed  BMP  Lab Results   Component Value Date     03/09/2020    K 4.4 03/09/2020     03/09/2020    CO2 27 03/09/2020    BUN 12 03/09/2020    CREATININE 1.1 03/09/2020    CALCIUM 10.1 03/09/2020    ANIONGAP 7 (L) 03/09/2020    ESTGFRAFRICA >60 03/09/2020    EGFRNONAA >60 03/09/2020     Lab Results   Component Value Date    CALCIUM 10.1 03/09/2020    PHOS 1.7 (L) 07/23/2016     Lab Results   Component Value Date    " ALBUMIN 4.2 03/09/2020     No results for input(s): POCTGLUCOSE in the last 24 hours.    Pertinent Medications Reviewed: reviewed      Estimated/Assessed Needs    Weight Used For Calorie Calculations: 72.2 kg (159 lb 2.8 oz)  Energy Calorie Requirements (kcal): 1772  Energy Need Method: [place]  Protein Requirements: 86 - 144 g   Weight Used For Protein Calculations: 72.2 kg (159 lb 2.8 oz)     Estimated Fluid Requirement Method: RDA Method(or per MD)  RDA Method (mL): 1772         Nutrition Prescription Ordered    Current Diet Order: NPO    Evaluation of Received Nutrient/Fluid Intake    Other Calories (kcal): 345.84(propofol @ 13.1 ml/hr )  % Kcal Needs: 19.51  % Protein Needs: 0     Intake/Output Summary (Last 24 hours) at 3/10/2020 0942  Last data filed at 3/10/2020 0700  Gross per 24 hour   Intake 603.9 ml   Output 950 ml   Net -346.1 ml     % Intake of Estimated Energy Needs: 0 - 25 %  % Meal Intake: NPO    Nutrition Risk      2xweekly    Assessment and Plan    Nutrition Problem  Inadequate energy intake     Related to (etiology):   NPO, no alternative means of nutrition     Signs and Symptoms (as evidenced by):   Meeting < 85 % EEN/EPN     Interventions  Enteral nutrition  collaboration with other providers    Nutrition Diagnosis Status:   New      Monitor and Evaluation    Food and Nutrient Intake: energy intake  Food and Nutrient Adminstration: enteral and parenteral nutrition administration  Anthropometric Measurements: weight  Biochemical Data, Medical Tests and Procedures: electrolyte and renal panel, glucose/endocrine profile  Nutrition-Focused Physical Findings: overall appearance     Malnutrition Assessment                   no significant muscle wasting/ fat depletion noted                    Nutrition Follow-Up    RD Follow-up?: Yes

## 2020-03-10 NOTE — H&P
[facility] Hospital Medicine  History & Physical    Patient Name: Maureen Luther  MRN: [id number]  Admission Date: 3/9/2020  Attending Physician: Delvin Brink Jr., MD   Primary Care Provider: Primary Doctor No         Patient information was obtained from past medical records and ER records.     Subjective:     Principal Problem:<principal problem not specified>    Chief Complaint:   Chief Complaint   Patient presents with    Shortness of Breath     pt c/o SOB hx asthma. no relief w/ albuterol inhaler         HPI:   59 year old man with history of tobacco abuse ,facial fracture,  asthma, and asbestos exposure who presents to the Hospital  for evaluation of SOB which onset suddenly this evening.  He used albuterol inhaler for his asthma symptoms without sustained relief.   Initial Ed vitals-  Initial Vitals [03/09/20 2234]   BP Pulse Resp Temp SpO2   (!) 180/112 97 (!) 21 98 °F (36.7 °C) 96 %         He continued to have worsening dyspnoea and was initially placed on bipap and was subsequently intubated.  ABG showed PH -7.1, Pco2-85.  Previous records showed urine toxicology  in 2019- was positive for cocaine,THC.      Past Medical History:   Diagnosis Date    Facial fracture     Medical history non-contributory     Scrotal abscess        Past Surgical History:   Procedure Laterality Date    None      ORBITAL RECONSTRUCTION      SCROTAL SURGERY         Review of patient's allergies indicates:  No Known Allergies    No current facility-administered medications on file prior to encounter.      Current Outpatient Medications on File Prior to Encounter   Medication Sig    hydrOXYzine HCl (ATARAX) 25 MG tablet Take 1 tablet (25 mg total) by mouth every 6 (six) hours.    ondansetron (ZOFRAN) 4 MG tablet Take 1 tablet (4 mg total) by mouth every 6 (six) hours.     Family History     Problem Relation (Age of Onset)    Hypertension         Tobacco Use    Smoking status: Current Every Day Smoker      Packs/day: 1.00     Types: Cigarettes   Substance and Sexual Activity    Alcohol use: No    Drug use: Yes     Types: Marijuana    Sexual activity: Not on file     Review of Systems   Unable to perform ROS: Intubated     Objective:     Vital Signs (Most Recent):  Temp: 98 °F (36.7 °C) (03/09/20 2234)  Pulse: 109 (03/10/20 0052)  Resp: (!) 23 (03/10/20 0052)  BP: (!) 130/92 (03/10/20 0052)  SpO2: 99 % (03/10/20 0052) Vital Signs (24h Range):  Temp:  [98 °F (36.7 °C)] 98 °F (36.7 °C)  Pulse:  [] 109  Resp:  [21-53] 23  SpO2:  [77 %-100 %] 99 %  BP: (108-232)/() 130/92     Weight: 72.7 kg (160 lb 4.4 oz)  Body mass index is 23.67 kg/m².    Physical Exam   Constitutional: He appears well-developed and well-nourished.   HENT:   Head: Normocephalic and atraumatic.   Eyes: Conjunctivae are normal.   Neck: Normal range of motion. Neck supple.   Cardiovascular: Regular rhythm.   tachycardia   Pulmonary/Chest: He is in respiratory distress. He has wheezes.   Intubated,sedated   Abdominal: Soft. Bowel sounds are normal.   Musculoskeletal: Normal range of motion.   Neurological:   Intubated, sedated    Skin: Skin is warm and dry.   Nursing note and vitals reviewed.          Significant Labs:   BMP:   Recent Labs   Lab 03/09/20  2311   GLU 92      K 4.4      CO2 27   BUN 12   CREATININE 1.1   CALCIUM 10.1     CBC:   Recent Labs   Lab 03/09/20  2311   WBC 8.90   HGB 14.7   HCT 44.3        Lipid Panel: No results for input(s): CHOL, HDL, LDLCALC, TRIG, CHOLHDL in the last 48 hours.  All pertinent labs within the past 24 hours have been reviewed.    Significant Imaging: I have reviewed and interpreted all pertinent imaging results/findings within the past 24 hours.    Assessment/Plan:     Asthma with acute exacerbation    Will continue duonebs, steroids ,ventilatory support .  ICU admit .    Uncontrolled hypertension    With previous history of positive cocaine use -03/2019- will repeat drug  screen, avoid betablockers for now, hydralazine prn, will use nitroglycerin for BP control if needed      Acute respiratory failure with hypercapnia  He is now intubated, will adjust  Ventilatory setting and will repeat ABG in 2 hours .  Continue duonebs,steroids, ventilatory support      VTE Risk Mitigation (From admission, onward)    None        Critical care time: >60 mts   Critical care time was exclusive of separately billable procedures and treating other patients.   Critical care was time spent personally by me on the following activities: development of treatment plan with patient or surrogate, evaluation of patient's response to treatment, examination of patient, obtaining history from patient or surrogate, ordering and performing treatments and interventions, ordering and review of laboratory studies, ordering and review of radiographic studies, pulse oximetry, re-evaluation of patient's condition, review of old charts .      Phillip Harris MD  Department of Hospital Medicine   [facility]

## 2020-03-10 NOTE — SIGNIFICANT EVENT
Patient currently intubated in ICU.  Will continue breathing treatments and steroids.  Wean vent as tolerated.  Continue Pepcid IV b.i.d. for ulcer prophylaxis, Lovenox daily for DVT prophylaxis.  ABG improving.  UDS positive for cocaine and opioids.  Plan to perform spontaneous breathing trial tomorrow, possible extubation in a.m.

## 2020-03-10 NOTE — CONSULTS
[facility]  Critical Care Medicine  Consult Note    Patient Name: Maureen Luther  MRN: [id number]  Admission Date: 3/9/2020  Hospital Length of Stay: 0 days  Code Status: Prior  Attending Physician: Phillip Harris MD   Primary Care Provider: Primary Doctor No   Principal Problem: Acute respiratory failure with hypoxia and hypercapnia      Subjective:     HPI:  Mr. Luther is a 60 y/o  male with a PMHx of asthma, asbestosis exposure, HTN, cocaine abuse, tobacco abuse, facial fractures, and scrotal abscess who presented to the [facility] ED yesterday evening for SOB that was not alleviated by his albuterol inhaler. His associated symptoms were dry cough, wheezing, rhinorrhea, and chest tightness. Patient was given albuterol, terbutaline, and prednisone in ED and placed on BiPaP with no improvement in respiratory status, therefore; the decision was made to intubate the patient. ICU has been consulted for management. CXR was unremarkable for acute processes. CT chest was unremarkable for acute processes. The patient's medical history was gathered from chart review and patient's sister.  Sister states patient smokes at lease 2 ppd and does habitual Cocaine, THC and prescription opiods.  UDS + Cocaine and Opiates.      Hospital/ICU Course:  3/10- Patient lying supine in bed on the ventilator. Lung fields notable for wheezing and tightness. Solu-mederol ordered. Ativan infusion d/c'd. Propofol infusion for sedation.  No distress on [facility] ventilation and sedation.  VSS    Past Medical History:   Diagnosis Date    Facial fracture     Medical history non-contributory     Scrotal abscess        Past Surgical History:   Procedure Laterality Date    None      ORBITAL RECONSTRUCTION      SCROTAL SURGERY         Review of patient's allergies indicates:  No Known Allergies    Family History     Problem Relation (Age of Onset)    Hypertension         Tobacco Use    Smoking status: Current Every  Day Smoker     Packs/day: 1.00     Types: Cigarettes   Substance and Sexual Activity    Alcohol use: No    Drug use: Yes     Types: Marijuana    Sexual activity: Not on file         Review of Systems   Unable to perform ROS: Intubated     Objective:     Vital Signs (Most Recent):  Temp: 97.4 °F (36.3 °C) (03/10/20 0705)  Pulse: 90 (03/10/20 0745)  Resp: (!) 24 (03/10/20 0745)  BP: (!) 111/94 (03/10/20 0712)  SpO2: 99 % (03/10/20 0742) Vital Signs (24h Range):  Temp:  [97.4 °F (36.3 °C)-98 °F (36.7 °C)] 97.4 °F (36.3 °C)  Pulse:  [] 90  Resp:  [17-53] 24  SpO2:  [77 %-100 %] 99 %  BP: ()/() 111/94     Weight: 72.2 kg (159 lb 2.8 oz)  Body mass index is 23.51 kg/m².      Intake/Output Summary (Last 24 hours) at 3/10/2020 0800  Last data filed at 3/10/2020 0700  Gross per 24 hour   Intake 595.2 ml   Output 950 ml   Net -354.8 ml       Physical Exam   Constitutional: He appears well-developed and well-nourished. No distress. He is sedated, intubated and restrained.   HENT:   Head: Normocephalic.   Nose: Nose normal.   Mouth/Throat: Oropharynx is clear and moist and mucous membranes are normal. Mucous membranes are not pale, not dry and not cyanotic.   Eyes: Pupils are equal, round, and reactive to light. Conjunctivae are normal.   Neck: Normal range of motion. No JVD present. No tracheal deviation present.   Cardiovascular: Normal rate, regular rhythm and normal heart sounds. Exam reveals no gallop and no friction rub.   No murmur heard.  Pulses:       Radial pulses are 1+ on the right side, and 2+ on the left side.        Dorsalis pedis pulses are 2+ on the right side, and 2+ on the left side.   Pulmonary/Chest: Effort normal. No accessory muscle usage or stridor. He is intubated. No respiratory distress. He has wheezes. He has no rales.   Abdominal: Soft. Bowel sounds are normal. He exhibits no distension. There is no tenderness. There is no guarding.   Genitourinary: Penis normal.    Musculoskeletal: Normal range of motion. He exhibits no edema or deformity.   Feet:   Right Foot:   Skin Integrity: Positive for warmth. Negative for ulcer or skin breakdown.   Neurological: GCS eye subscore is 1. GCS verbal subscore is 1. GCS motor subscore is 4.   Patient intubated and sedated. Patient does not open eyes to pain but does withdrawal to pain on all 4 extremities. +cough + gag.    Skin: Skin is warm and dry. Capillary refill takes less than 2 seconds. No rash noted. No cyanosis. Nails show no clubbing.       Vents:  Vent Mode: A/C (03/10/20 0704)  Set Rate: 24 BPM (03/10/20 0704)  Vt Set: 0 mL (03/10/20 0704)  Pressure Support: 0 cmH20 (03/10/20 0704)  PEEP/CPAP: 5 cmH20 (03/10/20 0704)  Oxygen Concentration (%): 35 (03/10/20 0745)  Peak Airway Pressure: 25 cmH2O (03/10/20 0704)  Plateau Pressure: 21 cmH20 (03/10/20 0704)  Total Ve: 11.4 mL (03/10/20 0704)  F/VT Ratio<105 (RSBI): (!) 49.18 (03/10/20 0704)    Lines/Drains/Airways     Drain                 NG/OG Tube 03/10/20 0024 Lanier sump 18 Fr. Right mouth less than 1 day         Urethral Catheter 03/10/20 0055 Latex 16 Fr. less than 1 day          Airway                 Airway - Non-Surgical 03/10/20 0018 Endotracheal Tube less than 1 day          Peripheral Intravenous Line                 Peripheral IV - Single Lumen 03/09/20 2311 22 G Right Antecubital less than 1 day         Peripheral IV - Single Lumen 03/10/20 0007 20 G Left Forearm less than 1 day         Peripheral IV - Single Lumen 03/10/20 0007 20 G Right Hand less than 1 day         Peripheral IV - Single Lumen 03/10/20 0246 18 G Right Forearm less than 1 day                Significant Labs:    CBC/Anemia Profile:  Recent Labs   Lab 03/09/20 2311   WBC 8.90   HGB 14.7   HCT 44.3      MCV 92   RDW 11.9        Chemistries:  Recent Labs   Lab 03/09/20 2311      K 4.4      CO2 27   BUN 12   CREATININE 1.1   CALCIUM 10.1   ALBUMIN 4.2   PROT 7.8   BILITOT 0.4   ALKPHOS  86   ALT 28   AST 26       ABGs:   Recent Labs   Lab 03/10/20  0341   PH 7.197*   PCO2 72.6*   HCO3 28.1*   POCSATURATED 99   BE 0     All pertinent labs within the past 24 hours have been reviewed.    Significant Imaging:   CT: I have reviewed all pertinent results/findings within the past 24 hours and my personal findings are:  CTA chest no PE noted and no focal infiltrate or effusions noted      ABG  Recent Labs   Lab 03/10/20  0341   PH 7.197*   PO2 147*   PCO2 72.6*   HCO3 28.1*   BE 0     Assessment/Plan:     Psychiatric  Cocaine abuse with THC use and abuse of prescription opiods  Will encourage cessation of cocaine use, THC use, and abuse of prescription opiates post extubation  Will consult  to provide assistance in cessation        Pulmonary  * Acute respiratory failure with hypoxia and hypercapnia  ABG's reviewed, vent settings reviewed and adjusted  SAT/SBT when improved aeration start tomorrow  Vent bundle ordered   VAP prophylaxis    Asthma with acute exacerbation  IV steroids, ICS, LABA and KYLAH    Other  Tobacco abuse  Encouraged tobacco cessation.           Preventive Measures and Monitoring:   Stress Ulcer: Pepcid  Nutrition: NPO  Glucose control: stable  Bowel prophylaxis: Miralax and PRN Dulcolax  DVT prophylaxis: LMWH/SCDs  Hx CAD on B-Blocker: no hx CAD  Head of Bed/Reposition: Elevate HOB and turn Q1-2 hours   Early Mobility: bed rest  SAT/SBT: start in AM  RASS goal: -3  Vent Day: #1  OG Day: #1  Gomez Day: #1  IVAB Day: #1  Code Status: Full  Pneumonia Vaccine: ordered  Flu Vaccine: ordered    Counseling/Consultation:I have discussed the care of this patient in detail with the bedside nursing staff and Dr. Logan and Dr. Harris    Critical Care Time: 52 minutes  Critical secondary to Patient has a condition that poses threat to life and bodily function: Acute Resp Failure intubated on mechanical ventilation  Patient is currently receiving parenteral controlled substances: Propofol  infusion     Critical care was time spent personally by me on the following activities: development of treatment plan with patient or surrogate and bedside caregivers, discussions with consultants, evaluation of patient's response to treatment, examination of patient, ordering and performing treatments and interventions, ordering and review of laboratory studies, ordering and review of radiographic studies, pulse oximetry, re-evaluation of patient's condition. This critical care time did not overlap with that of any other provider or involve time for any procedures.    Thank you for your consult. I will follow-up with patient. Please contact us if you have any additional questions.     Phillip Schafer NP  Critical Care Medicine  [facility]

## 2020-03-10 NOTE — HOSPITAL COURSE
3/10- Patient lying supine in bed on the ventilator. Lung fields notable for wheezing and tightness. Solu-mederol ordered. Ativan infusion d/c'd. Propofol infusion for sedation.  No distress on [facility] ventilation and sedation.  VSS

## 2020-03-10 NOTE — ED NOTES
Pt receiving breathing tx from RRT, showing signs of acute respiratory distress. MD notified, order received and being completed. Pt placed on BIPAP.

## 2020-03-10 NOTE — PROGRESS NOTES
e-ICU admit note      Reason for ICU admission:       HPI:    58 yo male ( PMx below) c/o sudden this pm.       Albuterol inhaler did not help    In ED BIPAP did not improve---> ETT      PHx:      history of tobacco abuse  facial fracture  asthma   asbestos exposure    Home Meds:    Albuterol  atenololbupropion  HTTZ  Atarax  Naprosyn  Zofran  Prednisone 20 mg  Hyrin      Significant labs:    7.155/85/600/30      Significant images:    CXR:     clear    CT lung:    No PE      ECG:    NSR, LVH      I viewed the pt via camera at 3:49 am:     100/59, 90 sinus, 100%, RR 24    sedated with propofol 40mcg/Kg/min  Lorazepam 5 mg/hr    Ventilator settings:    RR 24, , FiO2 405, peep 5    Ppeak 40        Assessment/Plan    Asthma exacerbation requiring intubation  Nebs q 4hrs  Steroids  Avoid autopeep, decrease RR if needed, briefly disconnect from ventilator if BP drops  Avoid morphine, b-b      DVT ppx- enoxaparin

## 2020-03-10 NOTE — INTERVAL H&P NOTE
The patient has been examined and the H&P has been reviewed:      More history from sister- has history of IV drug and cocaine abuse- needs out patient drug cessation counseling services.  Drug screen -positive for cocaine and opiates.  There is also reported history of Hep C , will follow hepatitis panel          Active Hospital Problems    Diagnosis  POA    Acute respiratory failure with hypercapnia [J96.02]  Yes    Uncontrolled hypertension [I10]  Yes    Asthma with acute exacerbation [J45.901]  Unknown    Drug abuse [F19.10]  Yes      Resolved Hospital Problems   No resolved problems to display.

## 2020-03-10 NOTE — PLAN OF CARE
Patient to ICU from the ER r/t sob/resp failure.  Patient intubated/sedated/tolerating vent well.  No acute change in condition noted. Family at bedside.

## 2020-03-10 NOTE — ED NOTES
Awaiting ativan infusion from pharmacy. Spoke with Rossi who states it is being compounded at this time and will be delivered as soon as it is finished.

## 2020-03-10 NOTE — ASSESSMENT & PLAN NOTE
With previous history of positive cocaine use -03/2019- will repeat drug screen, avoid betablockers for now, hydralazine prn, will use nitroglycerin for BP control if needed

## 2020-03-10 NOTE — ED NOTES
Dr Harris instructs to admin 500mL NS bolus and if pressure does not increase admin another 500mL bolus. If pressure does not increase after second bolus, notify hospital medicine.

## 2020-03-10 NOTE — ASSESSMENT & PLAN NOTE
He is now intubated, will adjust  Ventilatory setting and will repeat ABG in 2 hours .  Continue duonebs,steroids, ventilatory support

## 2020-03-10 NOTE — PLAN OF CARE
Patient resting in bed. Remains intubated and on propofol drip. Follows commands such as hand grasps. In bilateral soft wrist restraints for safety purposes- no signs of injury. VSS. Gomez catheter remains in place with good urine output. Afebrile and no signs of infection. Will attempt a weaning trial in AM. Tube feeds started and running at goal rate

## 2020-03-10 NOTE — ASSESSMENT & PLAN NOTE
Will encourage cessation of cocaine use, THC use, and abuse of prescription opiates post extubation  Will consult  to provide assistance in cessation

## 2020-03-10 NOTE — HPI
Mr. Luther is a 58 y/o  male with a PMHx of asthma, asbestosis exposure, HTN, cocaine abuse, tobacco abuse, facial fractures, and scrotal abscess who presented to the [facility] ED yesterday evening for SOB that was not alleviated by his albuterol inhaler. His associated symptoms were dry cough, wheezing, rhinorrhea, and chest tightness. Patient was given albuterol, terbutaline, and prednisone in ED and placed on BiPaP with no improvement in respiratory status, therefore; the decision was made to intubate the patient. ICU has been consulted for management. CXR was unremarkable for acute processes. CT chest was unremarkable for acute processes. The patient's medical history was gathered from chart review and patient's sister.  Sister states patient smokes at lease 2 ppd and does habitual Cocaine, THC and prescription opiods.  UDS + Cocaine and Opiates.

## 2020-03-10 NOTE — ASSESSMENT & PLAN NOTE
More history from sister- has history of IV drug and cocaine abuse- needs out patient drug cessation counseling services    3/11:  attempted to  on drug use reach  However patient had family members in room  Plan to revisit when family members are gone

## 2020-03-10 NOTE — ED PROVIDER NOTES
SCRIBE #1 NOTE: I, Meredith Blake, am scribing for, and in the presence of, Delvin Brink Jr., MD. I have scribed the entire note.       History     Chief Complaint   Patient presents with    Shortness of Breath     pt c/o SOB hx asthma. no relief w/ albuterol inhaler      Review of patient's allergies indicates:  No Known Allergies      History of Present Illness     HPI    3/9/2020, 10:51 PM  History obtained from the patient      History of Present Illness: Maureen Luther is a 59 y.o. male patient (current daily smoker) with a PMHx of facial fracture, scrotal abscess, asthma, and asbestos exposure who presents to the Emergency Department for evaluation of SOB which onset suddenly this evening. Pt reports that he tried using his albuterol inhaler for his asthma sx, but had no relief so came to the ED. Symptoms are constant and moderate in severity. No mitigating or exacerbating factors reported. Associated sxs include asthma, dry cough, wheezing, rhinorrhea, and chest tightness. Patient denies any fever/ chills, BLE edema, CP, palpations, numbness, HA, dizziness, rash, wound, abdominal pain, n/v/d, back/ neck pain, sore throat, congestion, dysuria, hematuria, localized weakness, easily bruising, and all other sxs at this time. Prior Tx includes albuterol inhaler use without relief. No further complaints or concerns at this time.     Arrival mode: Personal vehicle      PCP: Primary Doctor No        Past Medical History:  Past Medical History:   Diagnosis Date    Facial fracture     Medical history non-contributory     Scrotal abscess        Past Surgical History:  Past Surgical History:   Procedure Laterality Date    None      ORBITAL RECONSTRUCTION      SCROTAL SURGERY           Family History:  Family History   Problem Relation Age of Onset    Hypertension Unknown        Social History:  Social History     Tobacco Use    Smoking status: Current Every Day Smoker     Packs/day: 1.00     Types:  Cigarettes   Substance and Sexual Activity    Alcohol use: No    Drug use: Yes     Types: Marijuana    Sexual activity: Unknown        Review of Systems     Review of Systems   Constitutional: Negative for chills and fever.   HENT: Positive for rhinorrhea. Negative for congestion and sore throat.    Respiratory: Positive for cough (dry), chest tightness, shortness of breath and wheezing.         + Asthma sx   Cardiovascular: Negative for chest pain, palpitations and leg swelling (BLE).   Gastrointestinal: Negative for abdominal pain, diarrhea, nausea and vomiting.   Genitourinary: Negative for dysuria and hematuria.   Musculoskeletal: Negative for back pain and neck pain.   Skin: Negative for rash and wound.   Neurological: Negative for dizziness, weakness, numbness and headaches.   Hematological: Does not bruise/bleed easily.   All other systems reviewed and are negative.     Physical Exam     Initial Vitals [03/09/20 2234]   BP Pulse Resp Temp SpO2   (!) 180/112 97 (!) 21 98 °F (36.7 °C) 96 %      MAP       --          Physical Exam  Nursing Notes and Vital Signs Reviewed.   Constitutional: Patient is in moderate respiratory distress. Well-developed and well-nourished.  Head: Atraumatic. Normocephalic.  Eyes: . EOM intact. Conjunctivae are not pale. No scleral icterus.  ENT: Mucous membranes are moist. Oropharynx is clear and symmetric.  nares clear  Neck: Supple. Full ROM. No lymphadenopathy.  Cardiovascular: Regular rate. Regular rhythm. No murmurs, rubs, or gallops. Distal pulses are 2+ and symmetric.  Pulmonary/Chest: Pt is in moderate respiratory distress. No rales. Tachypneic. Wheezes in all lung fields.  Patient is sitting straight up in bed.  Airway is patent.  Abdominal: Soft and non-distended.  There is no tenderness.  No rebound, guarding, or rigidity. Good bowel sounds.  Genitourinary: No CVA tenderness  Musculoskeletal: Moves all extremities. No obvious deformities. No edema. No calf  tenderness.  Skin: Warm and dry.  Neurological:  Alert, awake, and appropriate.  Normal speech.  No acute focal neurological deficits are appreciated.  Psychiatric: Normal affect. Good eye contact. Appropriate in content.     ED Course   Critical Care  Date/Time: 3/9/2020 11:07 PM  Performed by: Delvin Brink Jr., MD  Authorized by: Delvin Brink Jr., MD   Direct patient critical care time: 35 minutes  Additional history critical care time: 10 minutes  Ordering / reviewing critical care time: 5 minutes  Documentation critical care time: 5 minutes  Consulting other physicians critical care time: 5 minutes  Total critical care time (exclusive of procedural time) : 60 minutes  Critical care time was exclusive of separately billable procedures and treating other patients and teaching time.  Critical care was necessary to treat or prevent imminent or life-threatening deterioration of the following conditions: respiratory failure.  Critical care was time spent personally by me on the following activities: development of treatment plan with patient or surrogate, evaluation of patient's response to treatment, examination of patient, obtaining history from patient or surrogate, ordering and performing treatments and interventions, ordering and review of laboratory studies, ordering and review of radiographic studies, pulse oximetry, re-evaluation of patient's condition and review of old charts.    Intubation  Date/Time: 3/10/2020 12:24 AM  Performed by: Delvin Brink Jr., MD  Authorized by: Delvin Brink Jr., MD   Consent Done: Emergent Situation  Indications: respiratory failure,  respiratory distress,  airway protection and  hypoxemia  Intubation method: direct  Patient status: sedated  Preoxygenation: bag valve mask  Pretreatment medications: atropine (Ativan 1 mg)  Sedatives: ketmine (Ketamine)  Paralytic: succinylcholine  Laryngoscope size: Ennis 4  Tube size: 7.5 mm  Tube type: cuffed  Number of attempts:  "1  Ventilation between attempts: BVM  Cricoid pressure: yes  Cords visualized: yes  Post-procedure assessment: chest rise  Breath sounds: equal and absent over the epigastrium  Cuff inflated: yes  ETT to lip: 26 cm  Tube secured with: ETT brewer  Chest x-ray interpreted by radiologist.  Chest x-ray findings: endotracheal tube in appropriate position  Patient tolerance: Patient tolerated the procedure well with no immediate complications  Complications: No  Specimens: No  Implants: No        ED Vital Signs:  Vitals:    03/09/20 2234 03/09/20 2251 03/09/20 2253 03/09/20 2257   BP: (!) 180/112      Pulse: 97 90 93 92   Resp: (!) 21 (!) 22 (!) 26 (!) 30   Temp: 98 °F (36.7 °C)      TempSrc: Oral      SpO2: 96%      Weight: 72.7 kg (160 lb 4.4 oz)      Height: 5' 9" (1.753 m)       03/09/20 2302 03/09/20 2305 03/09/20 2313 03/09/20 2316   BP: (!) 217/128  (!) 180/121 (!) 186/127   Pulse: 99 96 90 91   Resp:       Temp:       TempSrc:       SpO2: 100%  100% 99%   Weight:       Height:        03/09/20 2332 03/09/20 2341 03/09/20 2347   BP: (!) 205/140 (!) 190/124 (!) 205/133   Pulse: 95 93 99   Resp: (!) 41 (!) 37 (!) 39   Temp:      TempSrc:      SpO2: 100% 100% 97%   Weight:      Height:          Abnormal Lab Results:  Labs Reviewed   CBC W/ AUTO DIFFERENTIAL - Abnormal; Notable for the following components:       Result Value    MPV 9.0 (*)     Eos # 1.9 (*)     Eosinophil% 21.5 (*)     All other components within normal limits   COMPREHENSIVE METABOLIC PANEL - Abnormal; Notable for the following components:    Anion Gap 7 (*)     All other components within normal limits   INFLUENZA A & B BY MOLECULAR   TROPONIN I   B-TYPE NATRIURETIC PEPTIDE        All Lab Results:  Results for orders placed or performed during the hospital encounter of 03/09/20   Influenza A & B by Molecular   Result Value Ref Range    Influenza A, Molecular Negative Negative    Influenza B, Molecular Negative Negative    Flu A & B Source Nasal swab  "   CBC auto differential   Result Value Ref Range    WBC 8.90 3.90 - 12.70 K/uL    RBC 4.81 4.60 - 6.20 M/uL    Hemoglobin 14.7 14.0 - 18.0 g/dL    Hematocrit 44.3 40.0 - 54.0 %    Mean Corpuscular Volume 92 82 - 98 fL    Mean Corpuscular Hemoglobin 30.6 27.0 - 31.0 pg    Mean Corpuscular Hemoglobin Conc 33.2 32.0 - 36.0 g/dL    RDW 11.9 11.5 - 14.5 %    Platelets 292 150 - 350 K/uL    MPV 9.0 (L) 9.2 - 12.9 fL    Immature Granulocytes 0.3 0.0 - 0.5 %    Gran # (ANC) 4.8 1.8 - 7.7 K/uL    Immature Grans (Abs) 0.03 0.00 - 0.04 K/uL    Lymph # 1.7 1.0 - 4.8 K/uL    Mono # 0.4 0.3 - 1.0 K/uL    Eos # 1.9 (H) 0.0 - 0.5 K/uL    Baso # 0.08 0.00 - 0.20 K/uL    nRBC 0 0 /100 WBC    Gran% 53.9 38.0 - 73.0 %    Lymph% 19.4 18.0 - 48.0 %    Mono% 4.0 4.0 - 15.0 %    Eosinophil% 21.5 (H) 0.0 - 8.0 %    Basophil% 0.9 0.0 - 1.9 %    Differential Method Automated    Comprehensive metabolic panel   Result Value Ref Range    Sodium 140 136 - 145 mmol/L    Potassium 4.4 3.5 - 5.1 mmol/L    Chloride 106 95 - 110 mmol/L    CO2 27 23 - 29 mmol/L    Glucose 92 70 - 110 mg/dL    BUN, Bld 12 6 - 20 mg/dL    Creatinine 1.1 0.5 - 1.4 mg/dL    Calcium 10.1 8.7 - 10.5 mg/dL    Total Protein 7.8 6.0 - 8.4 g/dL    Albumin 4.2 3.5 - 5.2 g/dL    Total Bilirubin 0.4 0.1 - 1.0 mg/dL    Alkaline Phosphatase 86 55 - 135 U/L    AST 26 10 - 40 U/L    ALT 28 10 - 44 U/L    Anion Gap 7 (L) 8 - 16 mmol/L    eGFR if African American >60 >60 mL/min/1.73 m^2    eGFR if non African American >60 >60 mL/min/1.73 m^2   Troponin I   Result Value Ref Range    Troponin I 0.010 0.000 - 0.026 ng/mL   Brain natriuretic peptide   Result Value Ref Range    BNP 19 0 - 99 pg/mL       Imaging Results:  Imaging Results          X-Ray Chest 1 View (Final result)  Result time 03/09/20 23:34:43   Procedure changed from X-Ray Chest PA And Lateral     Final result by Pj Marcus MD (03/09/20 23:34:43)                 Impression:      No acute cardiopulmonary  disease.      Electronically signed by: Pj Marcus MD  Date:    03/09/2020  Time:    23:34             Narrative:    EXAMINATION:  XR CHEST 1 VIEW    CLINICAL HISTORY:  sob; Shortness of breath    TECHNIQUE:  Single frontal view of the chest was performed.    COMPARISON:  None    FINDINGS:  The lungs are clear.  Cardiomegaly.                                 The EKG was ordered, reviewed, and independently interpreted by the ED provider.  Interpretation time: 23:16  Rate: 89 BPM  Rhythm: normal sinus rhythm  Interpretation: Possible left atrial enlargement. LVH. Abnormal ECG. No STEMI.           The Emergency Provider reviewed the vital signs and test results, which are outlined above.     ED Discussion     11:47 PM  Patient has not improved with breathing treatments prompting the need for BiPAP.  Patient is tolerating BiPAP well at this time.  Blood pressure is markedly elevated will treat this with nitro and IV medications.    12:10 AM: Re-evaluated pt. I have discussed test results, shared treatment plan, and the need for admission with patient and family at bedside. Pt and family express understanding at this time and agree with all information. All questions answered. Pt and family have no further questions or concerns at this time. Pt is ready for admit.    12:23 AM: Discussed case with David Bianchi NP, (Hospital Medicine). David Bianchi NP, agrees with current care and management of pt and accepts admission.   Admitting Service: Hospital Medicine  Admitting Physician: Dr. Harris  Admit to: ICU    12:31 AM  Patient subsequently decompensated further.  Patient was intubated with pre dosing of Ativan atropine with ketamine and succinylcholine.  Patient was intubated without difficulty.  Patient is being admitted to ICU.  Dr. Harris evaluated the patient in department.     Medical Decision Making:   Clinical Tests:   Lab Tests: Ordered and Reviewed  Radiological Study: Ordered and Reviewed  Medical Tests:  Ordered and Reviewed           ED Medication(s):  Medications   propofol (DIPRIVAN) 10 mg/mL infusion (5 mcg/kg/min × 72.7 kg Intravenous New Bag 3/10/20 0026)   albuterol-ipratropium 2.5 mg-0.5 mg/3 mL nebulizer solution 3 mL (3 mLs Nebulization Given 3/9/20 2257)   terbutaline injection 0.25 mg (0.25 mg Subcutaneous Given 3/9/20 2320)   predniSONE tablet 60 mg (60 mg Oral Given 3/9/20 2320)   nitroGLYCERIN 2% TD oint ointment 1 inch (1 inch Topical (Top) Given 3/9/20 2346)   hydrALAZINE injection 20 mg (20 mg Intravenous Given 3/9/20 2346)   lorazepam (ATIVAN) injection 1 mg (1 mg Intravenous Given 3/10/20 0014)   atropine injection 0.5 mg (0.5 mg Intravenous Given 3/10/20 0014)   succinylcholine injection 76 mg (76 mg Intravenous Given by Other 3/10/20 0018)   ketamine injection 100 mg (100 mg Intravenous Given by Other 3/10/20 0017)       New Prescriptions    No medications on file               Scribe Attestation:   Scribe #1: I performed the above scribed service and the documentation accurately describes the services I performed. I attest to the accuracy of the note.     Attending:   Physician Attestation Statement for Scribe #1: I, Delvin Brink Jr., MD, personally performed the services described in this documentation, as scribed by Meredith Blake, in my presence, and it is both accurate and complete.           Clinical Impression       ICD-10-CM ICD-9-CM   1. Shortness of breath R06.02 786.05   2. Dyspnea R06.00 786.09       Disposition:   Disposition: Admitted  Condition: Serious         Delvin Brink Jr., MD  03/10/20 0031

## 2020-03-10 NOTE — ASSESSMENT & PLAN NOTE
ABG's reviewed, vent settings reviewed and adjusted  SAT/SBT when improved aeration start tomorrow  Vent bundle ordered   VAP prophylaxis

## 2020-03-10 NOTE — HPI
59 year old man with history of tobacco abuse ,facial fracture,  asthma, and asbestos exposure who presents to the Hospital  for evaluation of SOB which onset suddenly this evening.  He used albuterol inhaler for his asthma symptoms without sustained relief.   Initial Ed vitals-  Initial Vitals [03/09/20 2234]   BP Pulse Resp Temp SpO2   (!) 180/112 97 (!) 21 98 °F (36.7 °C) 96 %         He continued to have worsening dyspnoea and was initially placed on bipap and was subsequently intubated.  ABG showed PH -7.1, Pco2-85.  Previous records showed urine toxicology  in 2019- was positive for cocaine,THC.

## 2020-03-10 NOTE — PLAN OF CARE
Recommendations     Recommendation: 1. Rec'd TF of peptamen intense VHP @ 55 ml/hr. With current propofol infusion, provides 1666 kcal, 122 g pro and 1109 ml free water. Flushes per MD/NP. 2. RD to f/u.   Goals: Meet > 85 % EEN/EPN by RD f/u   Nutrition Goal Status: new  Communication of RD Recs: (POC, sticky note, reviewed with NP)

## 2020-03-10 NOTE — PROGRESS NOTES
Patient assessed.  Soft bilat wrist restraints renewed due to risk of pulling lines, tubes and/or climbing OOB.    WILLIE Schafer [facility]

## 2020-03-10 NOTE — SUBJECTIVE & OBJECTIVE
Past Medical History:   Diagnosis Date    Facial fracture     Medical history non-contributory     Scrotal abscess        Past Surgical History:   Procedure Laterality Date    None      ORBITAL RECONSTRUCTION      SCROTAL SURGERY         Review of patient's allergies indicates:  No Known Allergies    No current facility-administered medications on file prior to encounter.      Current Outpatient Medications on File Prior to Encounter   Medication Sig    hydrOXYzine HCl (ATARAX) 25 MG tablet Take 1 tablet (25 mg total) by mouth every 6 (six) hours.    ondansetron (ZOFRAN) 4 MG tablet Take 1 tablet (4 mg total) by mouth every 6 (six) hours.     Family History     Problem Relation (Age of Onset)    Hypertension         Tobacco Use    Smoking status: Current Every Day Smoker     Packs/day: 1.00     Types: Cigarettes   Substance and Sexual Activity    Alcohol use: No    Drug use: Yes     Types: Marijuana    Sexual activity: Not on file     Review of Systems   Unable to perform ROS: Intubated     Objective:     Vital Signs (Most Recent):  Temp: 98 °F (36.7 °C) (03/09/20 2234)  Pulse: 109 (03/10/20 0052)  Resp: (!) 23 (03/10/20 0052)  BP: (!) 130/92 (03/10/20 0052)  SpO2: 99 % (03/10/20 0052) Vital Signs (24h Range):  Temp:  [98 °F (36.7 °C)] 98 °F (36.7 °C)  Pulse:  [] 109  Resp:  [21-53] 23  SpO2:  [77 %-100 %] 99 %  BP: (108-232)/() 130/92     Weight: 72.7 kg (160 lb 4.4 oz)  Body mass index is 23.67 kg/m².    Physical Exam   Constitutional: He appears well-developed and well-nourished.   HENT:   Head: Normocephalic and atraumatic.   Eyes: Conjunctivae are normal.   Neck: Normal range of motion. Neck supple.   Cardiovascular: Regular rhythm.   tachycardia   Pulmonary/Chest: He is in respiratory distress. He has wheezes.   Intubated,sedated   Abdominal: Soft. Bowel sounds are normal.   Musculoskeletal: Normal range of motion.   Neurological:   Intubated, sedated    Skin: Skin is warm and dry.    Nursing note and vitals reviewed.          Significant Labs:   BMP:   Recent Labs   Lab 03/09/20  2311   GLU 92      K 4.4      CO2 27   BUN 12   CREATININE 1.1   CALCIUM 10.1     CBC:   Recent Labs   Lab 03/09/20  2311   WBC 8.90   HGB 14.7   HCT 44.3        Lipid Panel: No results for input(s): CHOL, HDL, LDLCALC, TRIG, CHOLHDL in the last 48 hours.  All pertinent labs within the past 24 hours have been reviewed.    Significant Imaging: I have reviewed and interpreted all pertinent imaging results/findings within the past 24 hours.

## 2020-03-11 LAB
ALBUMIN SERPL BCP-MCNC: 3.3 G/DL (ref 3.5–5.2)
ALLENS TEST: ABNORMAL
ALP SERPL-CCNC: 77 U/L (ref 55–135)
ALT SERPL W/O P-5'-P-CCNC: 35 U/L (ref 10–44)
ANION GAP SERPL CALC-SCNC: 11 MMOL/L (ref 8–16)
AST SERPL-CCNC: 21 U/L (ref 10–40)
BASOPHILS # BLD AUTO: 0.01 K/UL (ref 0–0.2)
BASOPHILS NFR BLD: 0.1 % (ref 0–1.9)
BILIRUB SERPL-MCNC: 0.4 MG/DL (ref 0.1–1)
BUN SERPL-MCNC: 24 MG/DL (ref 6–20)
CALCIUM SERPL-MCNC: 9.2 MG/DL (ref 8.7–10.5)
CHLORIDE SERPL-SCNC: 109 MMOL/L (ref 95–110)
CO2 SERPL-SCNC: 20 MMOL/L (ref 23–29)
CREAT SERPL-MCNC: 1.2 MG/DL (ref 0.5–1.4)
DELSYS: ABNORMAL
DIFFERENTIAL METHOD: ABNORMAL
EOSINOPHIL # BLD AUTO: 0 K/UL (ref 0–0.5)
EOSINOPHIL NFR BLD: 0.1 % (ref 0–8)
ERYTHROCYTE [DISTWIDTH] IN BLOOD BY AUTOMATED COUNT: 12.4 % (ref 11.5–14.5)
ERYTHROCYTE [SEDIMENTATION RATE] IN BLOOD BY WESTERGREN METHOD: 20 MM/H
EST. GFR  (AFRICAN AMERICAN): >60 ML/MIN/1.73 M^2
EST. GFR  (NON AFRICAN AMERICAN): >60 ML/MIN/1.73 M^2
FIO2: 35
GLUCOSE SERPL-MCNC: 134 MG/DL (ref 70–110)
HCO3 UR-SCNC: 23.5 MMOL/L (ref 24–28)
HCT VFR BLD AUTO: 40.9 % (ref 40–54)
HGB BLD-MCNC: 12.9 G/DL (ref 14–18)
IMM GRANULOCYTES # BLD AUTO: 0.07 K/UL (ref 0–0.04)
IMM GRANULOCYTES NFR BLD AUTO: 0.5 % (ref 0–0.5)
IP: 17
IT: 0.9
LYMPHOCYTES # BLD AUTO: 0.9 K/UL (ref 1–4.8)
LYMPHOCYTES NFR BLD: 6.9 % (ref 18–48)
MAGNESIUM SERPL-MCNC: 2.3 MG/DL (ref 1.6–2.6)
MCH RBC QN AUTO: 30.6 PG (ref 27–31)
MCHC RBC AUTO-ENTMCNC: 31.5 G/DL (ref 32–36)
MCV RBC AUTO: 97 FL (ref 82–98)
MODE: ABNORMAL
MONOCYTES # BLD AUTO: 0.2 K/UL (ref 0.3–1)
MONOCYTES NFR BLD: 1.5 % (ref 4–15)
NEUTROPHILS # BLD AUTO: 12.1 K/UL (ref 1.8–7.7)
NEUTROPHILS NFR BLD: 90.9 % (ref 38–73)
NRBC BLD-RTO: 0 /100 WBC
PCO2 BLDA: 39.7 MMHG (ref 35–45)
PEEP: 5
PH SMN: 7.38 [PH] (ref 7.35–7.45)
PLATELET # BLD AUTO: 234 K/UL (ref 150–350)
PMV BLD AUTO: 9.5 FL (ref 9.2–12.9)
PO2 BLDA: 76 MMHG (ref 80–100)
POC BE: -2 MMOL/L
POC SATURATED O2: 95 % (ref 95–100)
POCT GLUCOSE: 119 MG/DL (ref 70–110)
POTASSIUM SERPL-SCNC: 4.6 MMOL/L (ref 3.5–5.1)
PROT SERPL-MCNC: 6.4 G/DL (ref 6–8.4)
RBC # BLD AUTO: 4.22 M/UL (ref 4.6–6.2)
SAMPLE: ABNORMAL
SITE: ABNORMAL
SODIUM SERPL-SCNC: 140 MMOL/L (ref 136–145)
WBC # BLD AUTO: 13.34 K/UL (ref 3.9–12.7)

## 2020-03-11 PROCEDURE — S0028 INJECTION, FAMOTIDINE, 20 MG: HCPCS | Performed by: NURSE PRACTITIONER

## 2020-03-11 PROCEDURE — 21400001 HC TELEMETRY ROOM

## 2020-03-11 PROCEDURE — 25000242 PHARM REV CODE 250 ALT 637 W/ HCPCS: Performed by: INTERNAL MEDICINE

## 2020-03-11 PROCEDURE — 25000242 PHARM REV CODE 250 ALT 637 W/ HCPCS: Performed by: NURSE PRACTITIONER

## 2020-03-11 PROCEDURE — 94640 AIRWAY INHALATION TREATMENT: CPT

## 2020-03-11 PROCEDURE — 99900035 HC TECH TIME PER 15 MIN (STAT)

## 2020-03-11 PROCEDURE — 99291 PR CRITICAL CARE, E/M 30-74 MINUTES: ICD-10-PCS | Mod: ,,, | Performed by: NURSE PRACTITIONER

## 2020-03-11 PROCEDURE — 63600175 PHARM REV CODE 636 W HCPCS: Performed by: NURSE PRACTITIONER

## 2020-03-11 PROCEDURE — 85025 COMPLETE CBC W/AUTO DIFF WBC: CPT

## 2020-03-11 PROCEDURE — 36600 WITHDRAWAL OF ARTERIAL BLOOD: CPT

## 2020-03-11 PROCEDURE — 27000221 HC OXYGEN, UP TO 24 HOURS

## 2020-03-11 PROCEDURE — 36415 COLL VENOUS BLD VENIPUNCTURE: CPT

## 2020-03-11 PROCEDURE — 80053 COMPREHEN METABOLIC PANEL: CPT

## 2020-03-11 PROCEDURE — 25000003 PHARM REV CODE 250: Performed by: NURSE PRACTITIONER

## 2020-03-11 PROCEDURE — 25000003 PHARM REV CODE 250: Performed by: FAMILY MEDICINE

## 2020-03-11 PROCEDURE — 83735 ASSAY OF MAGNESIUM: CPT

## 2020-03-11 PROCEDURE — 94760 N-INVAS EAR/PLS OXIMETRY 1: CPT

## 2020-03-11 PROCEDURE — 99291 CRITICAL CARE FIRST HOUR: CPT | Mod: ,,, | Performed by: NURSE PRACTITIONER

## 2020-03-11 RX ORDER — GLYCOPYRROLATE 0.2 MG/ML
0.2 INJECTION INTRAMUSCULAR; INTRAVENOUS ONCE
Status: COMPLETED | OUTPATIENT
Start: 2020-03-11 | End: 2020-03-11

## 2020-03-11 RX ORDER — METHYLPREDNISOLONE SOD SUCC 125 MG
60 VIAL (EA) INJECTION EVERY 8 HOURS
Status: DISCONTINUED | OUTPATIENT
Start: 2020-03-11 | End: 2020-03-12

## 2020-03-11 RX ORDER — ATENOLOL 50 MG/1
50 TABLET ORAL DAILY
Status: DISCONTINUED | OUTPATIENT
Start: 2020-03-11 | End: 2020-03-12 | Stop reason: HOSPADM

## 2020-03-11 RX ORDER — METHYLPREDNISOLONE SOD SUCC 125 MG
80 VIAL (EA) INJECTION EVERY 8 HOURS
Status: DISCONTINUED | OUTPATIENT
Start: 2020-03-11 | End: 2020-03-11

## 2020-03-11 RX ORDER — HYDROCHLOROTHIAZIDE 25 MG/1
25 TABLET ORAL 2 TIMES DAILY
Status: DISCONTINUED | OUTPATIENT
Start: 2020-03-11 | End: 2020-03-12 | Stop reason: HOSPADM

## 2020-03-11 RX ORDER — IPRATROPIUM BROMIDE AND ALBUTEROL SULFATE 2.5; .5 MG/3ML; MG/3ML
3 SOLUTION RESPIRATORY (INHALATION) EVERY 6 HOURS
Status: DISCONTINUED | OUTPATIENT
Start: 2020-03-11 | End: 2020-03-12 | Stop reason: HOSPADM

## 2020-03-11 RX ORDER — AMLODIPINE BESYLATE 10 MG/1
10 TABLET ORAL DAILY
Status: DISCONTINUED | OUTPATIENT
Start: 2020-03-11 | End: 2020-03-12 | Stop reason: HOSPADM

## 2020-03-11 RX ORDER — FAMOTIDINE 20 MG/1
20 TABLET, FILM COATED ORAL 2 TIMES DAILY
Status: DISCONTINUED | OUTPATIENT
Start: 2020-03-11 | End: 2020-03-12 | Stop reason: HOSPADM

## 2020-03-11 RX ORDER — HYDRALAZINE HYDROCHLORIDE 20 MG/ML
20 INJECTION INTRAMUSCULAR; INTRAVENOUS EVERY 6 HOURS PRN
Status: DISCONTINUED | OUTPATIENT
Start: 2020-03-11 | End: 2020-03-12 | Stop reason: HOSPADM

## 2020-03-11 RX ADMIN — FAMOTIDINE 20 MG: 10 INJECTION INTRAVENOUS at 08:03

## 2020-03-11 RX ADMIN — IPRATROPIUM BROMIDE AND ALBUTEROL SULFATE 3 ML: .5; 3 SOLUTION RESPIRATORY (INHALATION) at 07:03

## 2020-03-11 RX ADMIN — METHYLPREDNISOLONE SODIUM SUCCINATE 60 MG: 125 INJECTION, POWDER, FOR SOLUTION INTRAMUSCULAR; INTRAVENOUS at 09:03

## 2020-03-11 RX ADMIN — BUDESONIDE 0.5 MG: 0.5 SUSPENSION RESPIRATORY (INHALATION) at 07:03

## 2020-03-11 RX ADMIN — AMLODIPINE BESYLATE 10 MG: 10 TABLET ORAL at 12:03

## 2020-03-11 RX ADMIN — CEFTRIAXONE 2 G: 2 INJECTION, SOLUTION INTRAVENOUS at 10:03

## 2020-03-11 RX ADMIN — METHYLPREDNISOLONE SODIUM SUCCINATE 80 MG: 125 INJECTION, POWDER, FOR SOLUTION INTRAMUSCULAR; INTRAVENOUS at 05:03

## 2020-03-11 RX ADMIN — HYDROCHLOROTHIAZIDE 25 MG: 25 TABLET ORAL at 09:03

## 2020-03-11 RX ADMIN — ARFORMOTEROL TARTRATE 15 MCG: 15 SOLUTION RESPIRATORY (INHALATION) at 07:03

## 2020-03-11 RX ADMIN — FAMOTIDINE 20 MG: 20 TABLET ORAL at 09:03

## 2020-03-11 RX ADMIN — HYDROCHLOROTHIAZIDE 25 MG: 25 TABLET ORAL at 10:03

## 2020-03-11 RX ADMIN — IPRATROPIUM BROMIDE AND ALBUTEROL SULFATE 3 ML: .5; 3 SOLUTION RESPIRATORY (INHALATION) at 03:03

## 2020-03-11 RX ADMIN — METHYLPREDNISOLONE SODIUM SUCCINATE 60 MG: 125 INJECTION, POWDER, FOR SOLUTION INTRAMUSCULAR; INTRAVENOUS at 01:03

## 2020-03-11 RX ADMIN — GLYCOPYRROLATE 0.2 MG: 0.2 INJECTION INTRAMUSCULAR; INTRAVENOUS at 07:03

## 2020-03-11 RX ADMIN — PROPOFOL 40 MCG/KG/MIN: 10 INJECTION, EMULSION INTRAVENOUS at 05:03

## 2020-03-11 RX ADMIN — IPRATROPIUM BROMIDE AND ALBUTEROL SULFATE 3 ML: .5; 3 SOLUTION RESPIRATORY (INHALATION) at 01:03

## 2020-03-11 RX ADMIN — ATENOLOL 50 MG: 50 TABLET ORAL at 08:03

## 2020-03-11 NOTE — PROGRESS NOTES
High risk screening completed due to low jess score. Patient extubated this am prior to assessment. Waffle mattress overlay and heel offloading boots in place. Sacrum, coccyx, bilateral buttock, bilateral heels assessed with no redness or breakdown noted. Recommend continued pressure injury prevention interventions.

## 2020-03-11 NOTE — PLAN OF CARE
Ongoing (interventions implemented as appropriate)  Pt AAO x4.  VSS  Pt transferred form ICU...arrived on unit around 1200  Pt able to make needs known.  Pt remained afebrile throughout this shift.   Pt ambulates in room, gait steady   Pt remained free of falls this shift.   Pt denies pain this shift.  Plan of care reviewed. Patient verbalizes understanding.   Pt moving/turing independent. Frequent weight shifting encouraged.  Patient sinus rhythm on monitor.   Bed low, side rails up x 2, wheels locked, call light in reach.   Hourly rounding completed.   Will continue to monitor.

## 2020-03-11 NOTE — HOSPITAL COURSE
Patient was admitted for acute asthma exacerbation requiring intubation.  He was monitored in ICU and extubate when appropriate.  Breathing treatments would continue steroids were on board.  Of note patient had leukocytosis during stay likely secondary to steroid use no signs of infection noted patient remained afebrile.  He was step-down from the ICU in stable in the floor.  UDS was positive for cocaine and opioids.  Drug cessation counseling was offered.  Patient was discharged.

## 2020-03-11 NOTE — ASSESSMENT & PLAN NOTE
Passed SAT/SBT trying to sit up in bed and pull OET  No distress post extubation  Cont low flow NC O2

## 2020-03-11 NOTE — PROGRESS NOTES
[facility]  Critical Care Medicine  Progress Note    Patient Name: Maureen Luther  MRN: [id number]  Admission Date: 3/9/2020  Hospital Length of Stay: 1 days  Code Status: Prior  Attending Provider: Rafael Knight MD  Primary Care Provider: Primary Doctor No   Principal Problem: Acute respiratory failure with hypoxia and hypercapnia    Subjective:     HPI:  Mr. Luther is a 58 y/o  male with a PMHx of asthma, asbestosis exposure, HTN, cocaine abuse, tobacco abuse, facial fractures, and scrotal abscess who presented to the [facility] ED yesterday evening for SOB that was not alleviated by his albuterol inhaler. His associated symptoms were dry cough, wheezing, rhinorrhea, and chest tightness. Patient was given albuterol, terbutaline, and prednisone in ED and placed on BiPaP with no improvement in respiratory status, therefore; the decision was made to intubate the patient. ICU has been consulted for management. CXR was unremarkable for acute processes. CT chest was unremarkable for acute processes. The patient's medical history was gathered from chart review and patient's sister.  Sister states patient smokes at lease 2 ppd and does habitual Cocaine, THC and prescription opiods.  UDS + Cocaine and Opiates.      Hospital/ICU Course:  3/10- Patient lying supine in bed on the ventilator. Lung fields notable for wheezing and tightness. Solu-mederol ordered. Ativan infusion d/c'd. Propofol infusion for sedation.  No distress on [facility] ventilation and sedation.  VSS    Review of Systems   Unable to perform ROS: Intubated         Objective:     Vital Signs (Most Recent):  Temp: 98.9 °F (37.2 °C) (03/11/20 0305)  Pulse: 107 (03/11/20 0729)  Resp: 20 (03/11/20 0729)  BP: (!) 92/53 (03/11/20 0635)  SpO2: 99 % (03/11/20 0729) Vital Signs (24h Range):  Temp:  [98.1 °F (36.7 °C)-99.2 °F (37.3 °C)] 98.9 °F (37.2 °C)  Pulse:  [] 107  Resp:  [15-27] 20  SpO2:  [94 %-100 %] 99 %  BP: ()/(39-88) 92/53      Weight: 71.9 kg (158 lb 8.2 oz)  Body mass index is 23.41 kg/m².      Intake/Output Summary (Last 24 hours) at 3/11/2020 0734  Last data filed at 3/11/2020 0600  Gross per 24 hour   Intake 1692.06 ml   Output 1280 ml   Net 412.06 ml       Physical Exam   Constitutional: He appears well-developed and well-nourished.  Non-toxic appearance. He has a sickly appearance. No distress. He is sedated, intubated and restrained.   HENT:   Head: Normocephalic.   Nose: Nose normal.   Mouth/Throat: Oropharynx is clear and moist and mucous membranes are normal. Abnormal dentition.   Eyes: Pupils are equal, round, and reactive to light. Conjunctivae and EOM are normal.   Neck: Full passive range of motion without pain. No JVD present. No tracheal deviation present.   Cardiovascular: Regular rhythm and normal heart sounds. Tachycardia present. Exam reveals no gallop and no friction rub.   No murmur heard.  Pulses:       Radial pulses are 2+ on the right side, and 2+ on the left side.        Dorsalis pedis pulses are 2+ on the right side, and 2+ on the left side.   Pulmonary/Chest: Effort normal. No accessory muscle usage. No tachypnea. He is intubated. No respiratory distress. He has decreased breath sounds (much improved aeration).   Abdominal: Soft. Bowel sounds are normal. He exhibits no distension. There is no tenderness. There is no guarding.   Genitourinary: Penis normal.   Genitourinary Comments: Gomez in place   Musculoskeletal: Normal range of motion. He exhibits no edema or deformity.        Right foot: There is no deformity.        Left foot: There is no deformity.   No edema   Lymphadenopathy:     He has no cervical adenopathy.     He has no axillary adenopathy.   Neurological: He is alert.   Patient awakening making purposeful movements reaching for OET and sitting up in bed during Sedation vacation   Skin: Skin is warm and dry. Capillary refill takes less than 2 seconds. No rash noted. No cyanosis. Nails show no  clubbing.       Vents:  Vent Mode: A/C (03/11/20 0521)  Set Rate: 20 BPM (03/11/20 0521)  Vt Set: 0 mL (03/11/20 0521)  Pressure Support: 0 cmH20 (03/11/20 0521)  PEEP/CPAP: 5 cmH20 (03/11/20 0521)  Oxygen Concentration (%): 35 (03/11/20 0635)  Peak Airway Pressure: 22 cmH2O (03/11/20 0521)  Plateau Pressure: 21 cmH20 (03/11/20 0521)  Total Ve: 11 mL (03/11/20 0521)  F/VT Ratio<105 (RSBI): (!) 45.83 (03/11/20 0521)    Lines/Drains/Airways     Drain                 NG/OG Tube 03/10/20 0024 [place] sump 18 Fr. Right mouth 1 day         Urethral Catheter 03/10/20 0055 Latex 16 Fr. 1 day          Airway                 Airway - Non-Surgical 03/10/20 0018 Endotracheal Tube 1 day          Peripheral Intravenous Line                 Peripheral IV - Single Lumen 03/09/20 2311 22 G Right Antecubital 1 day         Peripheral IV - Single Lumen 03/10/20 0007 20 G Left Forearm 1 day         Peripheral IV - Single Lumen 03/10/20 0007 20 G Right Hand 1 day         Peripheral IV - Single Lumen 03/10/20 0246 18 G Right Forearm 1 day                Significant Labs:    CBC/Anemia Profile:  Recent Labs   Lab 03/09/20 2311 03/11/20  0439   WBC 8.90 13.34*   HGB 14.7 12.9*   HCT 44.3 40.9    234   MCV 92 97   RDW 11.9 12.4        Chemistries:  Recent Labs   Lab 03/09/20 2311 03/11/20  0358 03/11/20  0439     --  140   K 4.4  --  4.6     --  109   CO2 27  --  20*   BUN 12  --  24*   CREATININE 1.1  --  1.2   CALCIUM 10.1  --  9.2   ALBUMIN 4.2  --  3.3*   PROT 7.8  --  6.4   BILITOT 0.4  --  0.4   ALKPHOS 86  --  77   ALT 28  --  35   AST 26  --  21   MG  --  2.3  --        ABGs:   Recent Labs   Lab 03/11/20  0527   PH 7.380   PCO2 39.7   HCO3 23.5*   POCSATURATED 95   BE -2     All pertinent labs within the past 24 hours have been reviewed.    Significant Imaging:  CXR: I have reviewed all pertinent results/findings within the past 24 hours and my personal findings are:  no acute pulm process noted      ABG  Recent  Labs   Lab 03/11/20  0527   PH 7.380   PO2 76*   PCO2 39.7   HCO3 23.5*   BE -2     Assessment/Plan:     Psychiatric  Cocaine abuse with THC use and abuse of prescription opiods  Will encourage cessation of cocaine use, THC use, and abuse of prescription opiates once more awake post extubation  Consider consult  to provide assistance in cessation        Pulmonary  * Acute respiratory failure with hypoxia and hypercapnia  Passed SAT/SBT trying to sit up in bed and pull OET  No distress post extubation  Cont low flow NC O2    Asthma with acute exacerbation  IV steroids, ICS, LABA and KYLAH  Cont Rocephin for 5 days total for possible URI    Cardiac/Vascular  Uncontrolled hypertension  Resume home Atenolol and HCTZ given BP elevating off Propofol infusion    Other  Tobacco abuse  Encouraged tobacco cessation.          Preventive Measures and Monitoring:   Stress Ulcer: Pepcid  Nutrition: NPO advance diet today  Glucose control: stable  Bowel prophylaxis: Miralax and PRN Dulcolax  DVT prophylaxis: LMWH/SCDs  Hx CAD on B-Blocker: no hx CAD  Head of Bed/Reposition: Elevate HOB and turn Q1-2 hours   Early Mobility: bed rest now perhaps OOB later today  SAT/SBT: Passed  Vent Day: #2 remove today  OG Day: #2 remove today  Gomez Day: #2  IVAB Day: #2  Code Status: Full  Pneumonia Vaccine: ordered  Flu Vaccine: ordered     Counseling/Consultation:I have discussed the care of this patient in detail with the bedside nursing staff and Dr. Logan and Dr. Knight     Critical Care Time: 49 minutes  Critical secondary to Patient has a condition that poses threat to life and bodily function: Acute Resp Failure intubated on mechanical ventilation  Patient is currently receiving parenteral controlled substances: Propofol infusion     Critical care was time spent personally by me on the following activities: development of treatment plan with patient or surrogate and bedside caregivers, discussions with consultants, evaluation of  patient's response to treatment, examination of patient, ordering and performing treatments and interventions, ordering and review of laboratory studies, ordering and review of radiographic studies, pulse oximetry, re-evaluation of patient's condition. This critical care time did not overlap with that of any other provider or involve time for any procedures.     Phillip Schafer NP  Critical Care Medicine  [facility]

## 2020-03-11 NOTE — SUBJECTIVE & OBJECTIVE
Interval History:  Extubated this a.m. Denies any current issues.    Review of Systems   Constitutional: Negative for fatigue and fever.   Respiratory: Negative for shortness of breath.    Cardiovascular: Negative for chest pain.   Gastrointestinal: Negative for nausea and vomiting.   Skin: Negative for rash.   Neurological: Negative for headaches.     Objective:     Vital Signs (Most Recent):  Temp: 98.9 °F (37.2 °C) (03/11/20 0305)  Pulse: 106 (03/11/20 1100)  Resp: 19 (03/11/20 1100)  BP: (!) 151/83 (03/11/20 1100)  SpO2: 97 % (03/11/20 1100) Vital Signs (24h Range):  Temp:  [98.1 °F (36.7 °C)-99.2 °F (37.3 °C)] 98.9 °F (37.2 °C)  Pulse:  [] 106  Resp:  [15-27] 19  SpO2:  [94 %-100 %] 97 %  BP: ()/() 151/83     Weight: 71.9 kg (158 lb 8.2 oz)  Body mass index is 23.41 kg/m².    Intake/Output Summary (Last 24 hours) at 3/11/2020 1118  Last data filed at 3/11/2020 1003  Gross per 24 hour   Intake 1649.11 ml   Output 1225 ml   Net 424.11 ml      Physical Exam   Constitutional: He is oriented to person, place, and time. He appears well-developed and well-nourished. No distress.   HENT:   Head: Normocephalic and atraumatic.   Eyes: Pupils are equal, round, and reactive to light. Conjunctivae are normal.   Cardiovascular: Normal rate, regular rhythm and normal heart sounds. Exam reveals no gallop and no friction rub.   No murmur heard.  Pulmonary/Chest: Effort normal and breath sounds normal. No stridor. No respiratory distress. He has no wheezes. He has no rales.   On nasal canula    Abdominal: Soft. Bowel sounds are normal. He exhibits no distension and no mass. There is no tenderness. There is no guarding.   Musculoskeletal: Normal range of motion. He exhibits no edema.   Neurological: He is alert and oriented to person, place, and time.   Skin: Skin is warm. He is not diaphoretic. No erythema.       Significant Labs:   Recent Lab Results       03/11/20  0527 03/11/20  0527   03/11/20  043    03/11/20  0358        Albumin     3.3       Alkaline Phosphatase     77       Allens Test Pass           ALT     35       Anion Gap     11       AST     21       Baso #     0.01       Basophil%     0.1       BILIRUBIN TOTAL     0.4  Comment:  For infants and newborns, interpretation of results should be based  on gestational age, weight and in agreement with clinical  observations.  Premature Infant recommended reference ranges:  Up to 24 hours.............<8.0 mg/dL  Up to 48 hours............<12.0 mg/dL  3-5 days..................<15.0 mg/dL  6-29 days.................<15.0 mg/dL         Site LR           BUN, Bld     24       Calcium     9.2       Chloride     109       CO2     20       Creatinine     1.2       DelSys Adult Vent           Differential Method     Automated       eGFR if      >60       eGFR if non      >60  Comment:  Calculation used to obtain the estimated glomerular filtration  rate (eGFR) is the CKD-EPI equation.          Eos #     0.0       Eosinophil%     0.1       FiO2 35           Glucose     134       Gran # (ANC)     12.1       Gran%     90.9       Hematocrit     40.9       Hemoglobin     12.9       Immature Grans (Abs)     0.07  Comment:  Mild elevation in immature granulocytes is non specific and   can be seen in a variety of conditions including stress response,   acute inflammation, trauma and pregnancy. Correlation with other   laboratory and clinical findings is essential.         Immature Granulocytes     0.5       IP 17           IT .9           Lymph #     0.9       Lymph%     6.9       Magnesium       2.3     MCH     30.6       MCHC     31.5       MCV     97  Comment:  Results confirmed, test repeated       Mode PCV           Mono #     0.2       Mono%     1.5       MPV     9.5       nRBC     0       PEEP 5           Platelets     234       POC BE -2           POC HCO3 23.5           POC PCO2 39.7           POC PH 7.380           POC PO2 76            POC SATURATED O2 95           POCT Glucose   119         Potassium     4.6       PROTEIN TOTAL     6.4       Rate 20           RBC     4.22       RDW     12.4       Sample ARTERIAL           Sodium     140       WBC     13.34           All pertinent labs within the past 24 hours have been reviewed.    Significant Imaging: I have reviewed all pertinent imaging results/findings within the past 24 hours.

## 2020-03-11 NOTE — ASSESSMENT & PLAN NOTE
He is now intubated, will adjust  Ventilatory setting and will repeat ABG in 2 hours .  Continue duonebs,steroids, ventilatory support    3/11:  Status post extubation this am  Continue monitor respiratory status  Plan to step down this afternoon  Possible discharge tomorrow if stable

## 2020-03-11 NOTE — PROGRESS NOTES
[facility] Hospital Medicine  Progress Note    Patient Name: Maureen Luther  MRN: [id number]  Patient Class: IP- Inpatient   Admission Date: 3/9/2020  Length of Stay: 1 days  Attending Physician: Rafael Knight MD  Primary Care Provider: Primary Doctor No        Subjective:     Principal Problem:Acute respiratory failure with hypoxia and hypercapnia        HPI:  59 year old man with history of tobacco abuse ,facial fracture,  asthma, and asbestos exposure who presents to the Hospital  for evaluation of SOB which onset suddenly this evening.  He used albuterol inhaler for his asthma symptoms without sustained relief.   Initial Ed vitals-  Initial Vitals [03/09/20 2234]   BP Pulse Resp Temp SpO2   (!) 180/112 97 (!) 21 98 °F (36.7 °C) 96 %         He continued to have worsening dyspnoea and was initially placed on bipap and was subsequently intubated.  ABG showed PH -7.1, Pco2-85.  Previous records showed urine toxicology  in 2019- was positive for cocaine,THC.      Overview/Hospital Course:  3/11:  Patient was extubated this morning, tolerating extubation well.  Continue current treatment with breathing treatments.  Will plan to step-down from ICU later today.  Will plan to observe on the floor overnight.  Possible discharge tomorrow if stable    Interval History:  Extubated this a.m. Denies any current issues.    Review of Systems   Constitutional: Negative for fatigue and fever.   Respiratory: Negative for shortness of breath.    Cardiovascular: Negative for chest pain.   Gastrointestinal: Negative for nausea and vomiting.   Skin: Negative for rash.   Neurological: Negative for headaches.     Objective:     Vital Signs (Most Recent):  Temp: 98.9 °F (37.2 °C) (03/11/20 0305)  Pulse: 106 (03/11/20 1100)  Resp: 19 (03/11/20 1100)  BP: (!) 151/83 (03/11/20 1100)  SpO2: 97 % (03/11/20 1100) Vital Signs (24h Range):  Temp:  [98.1 °F (36.7 °C)-99.2 °F (37.3 °C)] 98.9 °F (37.2 °C)  Pulse:  [] 106  Resp:   [15-27] 19  SpO2:  [94 %-100 %] 97 %  BP: ()/() 151/83     Weight: 71.9 kg (158 lb 8.2 oz)  Body mass index is 23.41 kg/m².    Intake/Output Summary (Last 24 hours) at 3/11/2020 1118  Last data filed at 3/11/2020 1003  Gross per 24 hour   Intake 1649.11 ml   Output 1225 ml   Net 424.11 ml      Physical Exam   Constitutional: He is oriented to person, place, and time. He appears well-developed and well-nourished. No distress.   HENT:   Head: Normocephalic and atraumatic.   Eyes: Pupils are equal, round, and reactive to light. Conjunctivae are normal.   Cardiovascular: Normal rate, regular rhythm and normal heart sounds. Exam reveals no gallop and no friction rub.   No murmur heard.  Pulmonary/Chest: Effort normal and breath sounds normal. No stridor. No respiratory distress. He has no wheezes. He has no rales.   On nasal canula    Abdominal: Soft. Bowel sounds are normal. He exhibits no distension and no mass. There is no tenderness. There is no guarding.   Musculoskeletal: Normal range of motion. He exhibits no edema.   Neurological: He is alert and oriented to person, place, and time.   Skin: Skin is warm. He is not diaphoretic. No erythema.       Significant Labs:   Recent Lab Results       03/11/20  0527   03/11/20  0527   03/11/20  0439   03/11/20  0358        Albumin     3.3       Alkaline Phosphatase     77       Allens Test Pass           ALT     35       Anion Gap     11       AST     21       Baso #     0.01       Basophil%     0.1       BILIRUBIN TOTAL     0.4  Comment:  For infants and newborns, interpretation of results should be based  on gestational age, weight and in agreement with clinical  observations.  Premature Infant recommended reference ranges:  Up to 24 hours.............<8.0 mg/dL  Up to 48 hours............<12.0 mg/dL  3-5 days..................<15.0 mg/dL  6-29 days.................<15.0 mg/dL         Site LR           BUN, Bld     24       Calcium     9.2       Chloride      109       CO2     20       Creatinine     1.2       Singlys Adult Vent           Differential Method     Automated       eGFR if      >60       eGFR if non      >60  Comment:  Calculation used to obtain the estimated glomerular filtration  rate (eGFR) is the CKD-EPI equation.          Eos #     0.0       Eosinophil%     0.1       FiO2 35           Glucose     134       Gran # (ANC)     12.1       Gran%     90.9       Hematocrit     40.9       Hemoglobin     12.9       Immature Grans (Abs)     0.07  Comment:  Mild elevation in immature granulocytes is non specific and   can be seen in a variety of conditions including stress response,   acute inflammation, trauma and pregnancy. Correlation with other   laboratory and clinical findings is essential.         Immature Granulocytes     0.5       IP 17           IT .9           Lymph #     0.9       Lymph%     6.9       Magnesium       2.3     MCH     30.6       MCHC     31.5       MCV     97  Comment:  Results confirmed, test repeated       Mode PCV           Mono #     0.2       Mono%     1.5       MPV     9.5       nRBC     0       PEEP 5           Platelets     234       POC BE -2           POC HCO3 23.5           POC PCO2 39.7           POC PH 7.380           POC PO2 76           POC SATURATED O2 95           POCT Glucose   119         Potassium     4.6       PROTEIN TOTAL     6.4       Rate 20           RBC     4.22       RDW     12.4       Sample ARTERIAL           Sodium     140       WBC     13.34           All pertinent labs within the past 24 hours have been reviewed.    Significant Imaging: I have reviewed all pertinent imaging results/findings within the past 24 hours.      Assessment/Plan:      * Acute respiratory failure with hypoxia and hypercapnia  He is now intubated, will adjust  Ventilatory setting and will repeat ABG in 2 hours .  Continue duonebs,steroids, ventilatory support    3/11:  Status post extubation this  am  Continue monitor respiratory status  Plan to step down this afternoon  Possible discharge tomorrow if stable    Cocaine abuse with THC use and abuse of prescription opiods    More history from sister- has history of IV drug and cocaine abuse- needs out patient drug cessation counseling services    3/11:  attempted to  on drug use reach  However patient had family members in room  Plan to revisit when family members are gone    Asthma with acute exacerbation    Will continue duonebs, steroids ,ventilatory support .  ICU admit .    3/11:  Continue current management  Continue breathing treatments and steroids  Extubated  Continue to monitor    Uncontrolled hypertension  With previous history of positive cocaine use -03/2019- will repeat drug screen, avoid betablockers for now, hydralazine prn, will use nitroglycerin for BP control if needed      3/11:  Patient currently on atenolol and HCTZ  BP remains uncontrolled  Will add amlodipine  Continue monitor BP    Tobacco abuse  3/11:  Smoking cessation counseling offered        VTE Risk Mitigation (From admission, onward)         Ordered     enoxaparin injection 40 mg  Daily      03/10/20 0645     IP VTE HIGH RISK PATIENT  Once      03/10/20 0636                Critical care time spent on the evaluation and treatment of severe organ dysfunction, review of pertinent labs and imaging studies, discussions with consulting providers and discussions with patient/family: 40 minutes.      Rafael Knight MD  Department of Hospital Medicine   [facility]

## 2020-03-11 NOTE — SUBJECTIVE & OBJECTIVE
Review of Systems   Unable to perform ROS: Intubated         Objective:     Vital Signs (Most Recent):  Temp: 98.9 °F (37.2 °C) (03/11/20 0305)  Pulse: 107 (03/11/20 0729)  Resp: 20 (03/11/20 0729)  BP: (!) 92/53 (03/11/20 0635)  SpO2: 99 % (03/11/20 0729) Vital Signs (24h Range):  Temp:  [98.1 °F (36.7 °C)-99.2 °F (37.3 °C)] 98.9 °F (37.2 °C)  Pulse:  [] 107  Resp:  [15-27] 20  SpO2:  [94 %-100 %] 99 %  BP: ()/(39-88) 92/53     Weight: 71.9 kg (158 lb 8.2 oz)  Body mass index is 23.41 kg/m².      Intake/Output Summary (Last 24 hours) at 3/11/2020 0734  Last data filed at 3/11/2020 0600  Gross per 24 hour   Intake 1692.06 ml   Output 1280 ml   Net 412.06 ml       Physical Exam   Constitutional: He appears well-developed and well-nourished.  Non-toxic appearance. He has a sickly appearance. No distress. He is sedated, intubated and restrained.   HENT:   Head: Normocephalic.   Nose: Nose normal.   Mouth/Throat: Oropharynx is clear and moist and mucous membranes are normal. Abnormal dentition.   Eyes: Pupils are equal, round, and reactive to light. Conjunctivae and EOM are normal.   Neck: Full passive range of motion without pain. No JVD present. No tracheal deviation present.   Cardiovascular: Regular rhythm and normal heart sounds. Tachycardia present. Exam reveals no gallop and no friction rub.   No murmur heard.  Pulses:       Radial pulses are 2+ on the right side, and 2+ on the left side.        Dorsalis pedis pulses are 2+ on the right side, and 2+ on the left side.   Pulmonary/Chest: Effort normal. No accessory muscle usage. No tachypnea. He is intubated. No respiratory distress. He has decreased breath sounds (much improved aeration).   Abdominal: Soft. Bowel sounds are normal. He exhibits no distension. There is no tenderness. There is no guarding.   Genitourinary: Penis normal.   Genitourinary Comments: Gomez in place   Musculoskeletal: Normal range of motion. He exhibits no edema or deformity.         Right foot: There is no deformity.        Left foot: There is no deformity.   No edema   Lymphadenopathy:     He has no cervical adenopathy.     He has no axillary adenopathy.   Neurological: He is alert.   Patient awakening making purposeful movements reaching for OET and sitting up in bed during Sedation vacation   Skin: Skin is warm and dry. Capillary refill takes less than 2 seconds. No rash noted. No cyanosis. Nails show no clubbing.       Vents:  Vent Mode: A/C (03/11/20 0521)  Set Rate: 20 BPM (03/11/20 0521)  Vt Set: 0 mL (03/11/20 0521)  Pressure Support: 0 cmH20 (03/11/20 0521)  PEEP/CPAP: 5 cmH20 (03/11/20 0521)  Oxygen Concentration (%): 35 (03/11/20 0635)  Peak Airway Pressure: 22 cmH2O (03/11/20 0521)  Plateau Pressure: 21 cmH20 (03/11/20 0521)  Total Ve: 11 mL (03/11/20 0521)  F/VT Ratio<105 (RSBI): (!) 45.83 (03/11/20 0521)    Lines/Drains/Airways     Drain                 NG/OG Tube 03/10/20 0024 Custer sump 18 Fr. Right mouth 1 day         Urethral Catheter 03/10/20 0055 Latex 16 Fr. 1 day          Airway                 Airway - Non-Surgical 03/10/20 0018 Endotracheal Tube 1 day          Peripheral Intravenous Line                 Peripheral IV - Single Lumen 03/09/20 2311 22 G Right Antecubital 1 day         Peripheral IV - Single Lumen 03/10/20 0007 20 G Left Forearm 1 day         Peripheral IV - Single Lumen 03/10/20 0007 20 G Right Hand 1 day         Peripheral IV - Single Lumen 03/10/20 0246 18 G Right Forearm 1 day                Significant Labs:    CBC/Anemia Profile:  Recent Labs   Lab 03/09/20 2311 03/11/20  0439   WBC 8.90 13.34*   HGB 14.7 12.9*   HCT 44.3 40.9    234   MCV 92 97   RDW 11.9 12.4        Chemistries:  Recent Labs   Lab 03/09/20 2311 03/11/20  0358 03/11/20  0439     --  140   K 4.4  --  4.6     --  109   CO2 27  --  20*   BUN 12  --  24*   CREATININE 1.1  --  1.2   CALCIUM 10.1  --  9.2   ALBUMIN 4.2  --  3.3*   PROT 7.8  --  6.4   BILITOT 0.4   --  0.4   ALKPHOS 86  --  77   ALT 28  --  35   AST 26  --  21   MG  --  2.3  --        ABGs:   Recent Labs   Lab 03/11/20  0527   PH 7.380   PCO2 39.7   HCO3 23.5*   POCSATURATED 95   BE -2     All pertinent labs within the past 24 hours have been reviewed.    Significant Imaging:  CXR: I have reviewed all pertinent results/findings within the past 24 hours and my personal findings are:  no acute pulm process noted

## 2020-03-11 NOTE — ASSESSMENT & PLAN NOTE
With previous history of positive cocaine use -03/2019- will repeat drug screen, avoid betablockers for now, hydralazine prn, will use nitroglycerin for BP control if needed      3/11:  Patient currently on atenolol and HCTZ  BP remains uncontrolled  Will add amlodipine  Continue monitor BP

## 2020-03-11 NOTE — ASSESSMENT & PLAN NOTE
Will continue duonebs, steroids ,ventilatory support .  ICU admit .    3/11:  Continue current management  Continue breathing treatments and steroids  Extubated  Continue to monitor

## 2020-03-11 NOTE — PLAN OF CARE
Patient continues to be on mechanical ventilation. Patient tolerated breathing treatments without issue.

## 2020-03-11 NOTE — ASSESSMENT & PLAN NOTE
Will encourage cessation of cocaine use, THC use, and abuse of prescription opiates once more awake post extubation  Consider consult  to provide assistance in cessation

## 2020-03-12 VITALS
DIASTOLIC BLOOD PRESSURE: 93 MMHG | HEART RATE: 75 BPM | RESPIRATION RATE: 18 BRPM | BODY MASS INDEX: 23.51 KG/M2 | WEIGHT: 158.75 LBS | SYSTOLIC BLOOD PRESSURE: 151 MMHG | TEMPERATURE: 98 F | OXYGEN SATURATION: 97 % | HEIGHT: 69 IN

## 2020-03-12 PROBLEM — J96.01 ACUTE RESPIRATORY FAILURE WITH HYPOXIA AND HYPERCAPNIA: Status: RESOLVED | Noted: 2020-03-10 | Resolved: 2020-03-12

## 2020-03-12 PROBLEM — J45.901 ASTHMA WITH ACUTE EXACERBATION: Status: RESOLVED | Noted: 2020-03-10 | Resolved: 2020-03-12

## 2020-03-12 PROBLEM — J96.02 ACUTE RESPIRATORY FAILURE WITH HYPOXIA AND HYPERCAPNIA: Status: RESOLVED | Noted: 2020-03-10 | Resolved: 2020-03-12

## 2020-03-12 LAB
ALBUMIN SERPL BCP-MCNC: 3.5 G/DL (ref 3.5–5.2)
ALP SERPL-CCNC: 82 U/L (ref 55–135)
ALT SERPL W/O P-5'-P-CCNC: 31 U/L (ref 10–44)
ANION GAP SERPL CALC-SCNC: 10 MMOL/L (ref 8–16)
AST SERPL-CCNC: 20 U/L (ref 10–40)
BASOPHILS # BLD AUTO: 0.02 K/UL (ref 0–0.2)
BASOPHILS NFR BLD: 0.1 % (ref 0–1.9)
BILIRUB SERPL-MCNC: 0.5 MG/DL (ref 0.1–1)
BUN SERPL-MCNC: 29 MG/DL (ref 6–20)
CALCIUM SERPL-MCNC: 9.5 MG/DL (ref 8.7–10.5)
CHLORIDE SERPL-SCNC: 106 MMOL/L (ref 95–110)
CO2 SERPL-SCNC: 25 MMOL/L (ref 23–29)
CREAT SERPL-MCNC: 1.2 MG/DL (ref 0.5–1.4)
DIFFERENTIAL METHOD: ABNORMAL
EOSINOPHIL # BLD AUTO: 0 K/UL (ref 0–0.5)
EOSINOPHIL NFR BLD: 0 % (ref 0–8)
ERYTHROCYTE [DISTWIDTH] IN BLOOD BY AUTOMATED COUNT: 12 % (ref 11.5–14.5)
EST. GFR  (AFRICAN AMERICAN): >60 ML/MIN/1.73 M^2
EST. GFR  (NON AFRICAN AMERICAN): >60 ML/MIN/1.73 M^2
GLUCOSE SERPL-MCNC: 130 MG/DL (ref 70–110)
HCT VFR BLD AUTO: 41.3 % (ref 40–54)
HGB BLD-MCNC: 13.4 G/DL (ref 14–18)
IMM GRANULOCYTES # BLD AUTO: 0.16 K/UL (ref 0–0.04)
IMM GRANULOCYTES NFR BLD AUTO: 0.8 % (ref 0–0.5)
LYMPHOCYTES # BLD AUTO: 0.6 K/UL (ref 1–4.8)
LYMPHOCYTES NFR BLD: 3.2 % (ref 18–48)
MCH RBC QN AUTO: 30.1 PG (ref 27–31)
MCHC RBC AUTO-ENTMCNC: 32.4 G/DL (ref 32–36)
MCV RBC AUTO: 93 FL (ref 82–98)
MONOCYTES # BLD AUTO: 0.3 K/UL (ref 0.3–1)
MONOCYTES NFR BLD: 1.5 % (ref 4–15)
NEUTROPHILS # BLD AUTO: 17.9 K/UL (ref 1.8–7.7)
NEUTROPHILS NFR BLD: 94.4 % (ref 38–73)
NRBC BLD-RTO: 0 /100 WBC
PLATELET # BLD AUTO: 282 K/UL (ref 150–350)
PMV BLD AUTO: 9.1 FL (ref 9.2–12.9)
POTASSIUM SERPL-SCNC: 4.4 MMOL/L (ref 3.5–5.1)
PROT SERPL-MCNC: 6.8 G/DL (ref 6–8.4)
RBC # BLD AUTO: 4.45 M/UL (ref 4.6–6.2)
SODIUM SERPL-SCNC: 141 MMOL/L (ref 136–145)
WBC # BLD AUTO: 18.95 K/UL (ref 3.9–12.7)

## 2020-03-12 PROCEDURE — 85025 COMPLETE CBC W/AUTO DIFF WBC: CPT

## 2020-03-12 PROCEDURE — 97161 PT EVAL LOW COMPLEX 20 MIN: CPT

## 2020-03-12 PROCEDURE — 97165 OT EVAL LOW COMPLEX 30 MIN: CPT | Performed by: PHYSICAL THERAPIST

## 2020-03-12 PROCEDURE — 25000242 PHARM REV CODE 250 ALT 637 W/ HCPCS: Performed by: NURSE PRACTITIONER

## 2020-03-12 PROCEDURE — 97116 GAIT TRAINING THERAPY: CPT

## 2020-03-12 PROCEDURE — 25000003 PHARM REV CODE 250: Performed by: NURSE PRACTITIONER

## 2020-03-12 PROCEDURE — 80053 COMPREHEN METABOLIC PANEL: CPT

## 2020-03-12 PROCEDURE — 94640 AIRWAY INHALATION TREATMENT: CPT

## 2020-03-12 PROCEDURE — 63600175 PHARM REV CODE 636 W HCPCS: Performed by: NURSE PRACTITIONER

## 2020-03-12 PROCEDURE — 97530 THERAPEUTIC ACTIVITIES: CPT | Performed by: PHYSICAL THERAPIST

## 2020-03-12 PROCEDURE — 90471 IMMUNIZATION ADMIN: CPT | Performed by: NURSE PRACTITIONER

## 2020-03-12 PROCEDURE — 94761 N-INVAS EAR/PLS OXIMETRY MLT: CPT

## 2020-03-12 PROCEDURE — 36415 COLL VENOUS BLD VENIPUNCTURE: CPT

## 2020-03-12 PROCEDURE — 90686 IIV4 VACC NO PRSV 0.5 ML IM: CPT | Performed by: NURSE PRACTITIONER

## 2020-03-12 RX ORDER — AMLODIPINE BESYLATE 10 MG/1
10 TABLET ORAL DAILY
Qty: 30 TABLET | Refills: 11 | Status: SHIPPED | OUTPATIENT
Start: 2020-03-13 | End: 2023-10-10 | Stop reason: SDUPTHER

## 2020-03-12 RX ADMIN — CEFTRIAXONE 2 G: 2 INJECTION, SOLUTION INTRAVENOUS at 08:03

## 2020-03-12 RX ADMIN — AMLODIPINE BESYLATE 10 MG: 10 TABLET ORAL at 08:03

## 2020-03-12 RX ADMIN — HYDROCHLOROTHIAZIDE 25 MG: 25 TABLET ORAL at 08:03

## 2020-03-12 RX ADMIN — BUDESONIDE 0.5 MG: 0.5 SUSPENSION RESPIRATORY (INHALATION) at 07:03

## 2020-03-12 RX ADMIN — ARFORMOTEROL TARTRATE 15 MCG: 15 SOLUTION RESPIRATORY (INHALATION) at 07:03

## 2020-03-12 RX ADMIN — ATENOLOL 50 MG: 50 TABLET ORAL at 08:03

## 2020-03-12 RX ADMIN — IPRATROPIUM BROMIDE AND ALBUTEROL SULFATE 3 ML: .5; 3 SOLUTION RESPIRATORY (INHALATION) at 12:03

## 2020-03-12 RX ADMIN — INFLUENZA VIRUS VACCINE 0.5 ML: 15; 15; 15; 15 SUSPENSION INTRAMUSCULAR at 11:03

## 2020-03-12 RX ADMIN — IPRATROPIUM BROMIDE AND ALBUTEROL SULFATE 3 ML: .5; 3 SOLUTION RESPIRATORY (INHALATION) at 07:03

## 2020-03-12 RX ADMIN — FAMOTIDINE 20 MG: 20 TABLET ORAL at 08:03

## 2020-03-12 RX ADMIN — METHYLPREDNISOLONE SODIUM SUCCINATE 60 MG: 125 INJECTION, POWDER, FOR SOLUTION INTRAMUSCULAR; INTRAVENOUS at 05:03

## 2020-03-12 NOTE — PT/OT/SLP EVAL
Occupational Therapy   Evaluation and Discharge Note    Name: Maureen Luther  MRN: [id number]  Admitting Diagnosis:  Acute respiratory failure with hypoxia and hypercapnia      Recommendations:     Discharge Recommendations: home  Discharge Equipment Recommendations:  none  Barriers to discharge:       Assessment:     Maureen Luther is a 59 y.o. male with a medical diagnosis of Acute respiratory failure with hypoxia and hypercapnia. At this time, patient is functioning at their prior level of function and does not require further acute OT services.     Plan:     During this hospitalization, patient does not require further acute OT services.  Please re-consult if situation changes.    · Plan of Care Reviewed with: patient    Subjective     Chief Complaint: no complaints  Patient/Family Comments/goals: to return home    Occupational Profile:  Living Environment: lives with laura` in 1 story no steps  Previous level of function: (I) with ADLs and Ambulation  Roles and Routines: Drives; Works as a used care salesman  Equipment Used at home:  none  Assistance upon Discharge: family    Pain/Comfort:  · Pain Rating 1: 0/10    Patients cultural, spiritual, Yarsani conflicts given the current situation: no    Objective:     Communicated with: Nurse Martino and epic chart review prior to session.  Patient found supine with telemetry, peripheral IV upon OT entry to room.    General Precautions: Standard,     Orthopedic Precautions:N/A   Braces: N/A     Occupational Performance:    Bed Mobility:    · Patient completed Rolling/Turning to Left with  independence  · Patient completed Rolling/Turning to Right with independence  · Patient completed Scooting/Bridging with independence  · Patient completed Supine to Sit with independence    Functional Mobility/Transfers:  · Patient completed Sit <> Stand Transfer with modified independence  with  no assistive device   · Patient completed Bed <> Chair Transfer using Step Transfer  technique with modified independence with no assistive device  · Functional Mobility: ~200ft Mod I, no AD    Activities of Daily Living:  · Upper Body Dressing: independence .  · Lower Body Dressing: independence .    Cognitive/Visual Perceptual:  Cognitive/Psychosocial Skills:     -       Oriented to: Person, Place, Time and Situation   -       Follows Commands/attention:Follows multistep  commands  -       Communication: clear/fluent  -       Memory: No Deficits noted  -       Safety awareness/insight to disability: intact   Visual/Perceptual:      -Intact .    Physical Exam:  Dominant hand:    -       right  Upper Extremity Range of Motion:     -       Right Upper Extremity: WNL  -       Left Upper Extremity: WNL  Upper Extremity Strength:    -       Right Upper Extremity: WNL  -       Left Upper Extremity: WNL   Strength:    -       Right Upper Extremity: WNL  -       Left Upper Extremity: WNL    AMPAC 6 Click ADL:  AMPAC Total Score: 24    Treatment & Education:  OT Eval completed as listed above. Pt functioning at [facility] and does not require skilled OT at this time.   Education:    Patient left up in chair with all lines intact, call button in reach, chair alarm on and Nurse Salena notified    GOALS:   Multidisciplinary Problems     Occupational Therapy Goals     Not on file                History:     Past Medical History:   Diagnosis Date    Facial fracture     Medical history non-contributory     Scrotal abscess        Past Surgical History:   Procedure Laterality Date    None      ORBITAL RECONSTRUCTION      SCROTAL SURGERY         Time Tracking:     OT Date of Treatment: 03/12/20  OT Start Time: 0830  OT Stop Time: 0853  OT Total Time (min): 23 min    Billable Minutes:Evaluation 15 min  Therapeutic Activity 8 min    Amee Ayoub PT/OT  3/12/2020

## 2020-03-12 NOTE — PLAN OF CARE
Pt AAO x4.  Pt remains free of falls throughout shift.  Pt denies pain throughout shift.  Plan of care reviewed. Pt verbalizes understanding.  Pt moving and turning independently. Frequent weight shifting encouraged.  Normal sinus rhythm on monitor.  Hourly rounding completed.   Went over discharge instructions with patient at bedside.  Stressed the importance of making and keeping all appointments.  Prescriptions delivered to bedside.  Pt verbalized understanding.   Had all questions regarding discharge answered to satisfaction.  IV's removed without complications.  Tele box removed and returned to monitor tech.  Pt would not use wheelchair to leave. Walked to vehicle with girlfriend.

## 2020-03-12 NOTE — PLAN OF CARE
Patient AAOX 4. VSS  Patient remained afebrile throughout shift.  Patient remained free of falls this shift  Patient 0/10 of pain this shift  Plan of care reviewed.  Patient verbalized understanding.  Patient moving/turning with assistance  Frequent weight shifting encouraged.  Patient NSR on monitor  Bed low, side rails up x2, wheels locked, call light in reach.  Bed alarm maintained for safety.  Patient instructed to call for assistance.  Hourly rounding completed.  Will continue to monitor.

## 2020-03-12 NOTE — PT/OT/SLP EVAL
Physical Therapy Evaluation and Discharge Note    Patient Name:  Maureen Luther   MRN:  [id number]    Recommendations:     Discharge Recommendations:  home   Discharge Equipment Recommendations: none   Barriers to discharge: None    Assessment:     Maureen Luther is a 59 y.o. male admitted with a medical diagnosis of Acute respiratory failure with hypoxia and hypercapnia. .  At this time, patient is functioning at their prior level of function and does not require further acute PT services.     Recent Surgery: * No surgery found *     Plan:     During this hospitalization, patient does not require further acute PT services.  Please re-consult if situation changes.      Subjective     Chief Complaint:   Patient/Family Comments/goals:   Pain/Comfort:  · Pain Rating 1: 0/10    Patients cultural, spiritual, Holiness conflicts given the current situation:      Living Environment:  PT LIVES WITH MISHA 1 STORY HOUSE NO STEPS, AMB INDEP COMMUNITY DISTANCES, STILL DRIVE AND WORKS, INDEP WITH ADL'S  Prior to admission, patients level of function was INDEP.  Equipment used at home: none.  DME owned (not currently used): none.  Upon discharge, patient will have assistance from [facility].    Objective:     Communicated with NURSE  prior to session.  Patient found supine with telemetry, peripheral IV upon PT entry to room.    General Precautions: Standard  Orthopedic Precautions:N/A   Braces: N/A     Exams:  · Cognitive Exam:  Patient is oriented to Person, Place, Time and Situation  · Postural Exam:  Patient presented with the following abnormalities:    · -       No postural abnormalities identified  · Sensation:    · -       Intact  · RLE ROM: WNL  · RLE Strength: WNL  · LLE ROM: WNL  · LLE Strength: WNL    Functional Mobility:  · Bed Mobility:     · Rolling Left:  independence  · Scooting: independence  · Supine to Sit: independence  · Transfers:     · Sit to Stand:  modified independence with no AD  · Bed to Chair:  modified independence with  no AD  using  Step Transfer  · Gait: PT ' JOVANNY NO LOB OR SOB ON ROOM AIR  · Balance: GOOD    AM-PAC 6 CLICK MOBILITY  Total Score:21     Therapeutic Activities and Exercises:   PT EDUCATED IN ROLE OF P.T., PT DONNED SOCKS AND ROBE TORITO     AM-PAC 6 CLICK MOBILITY  Total Score:21     Patient left up in chair with all lines intact, call button in reach, chair alarm on and NURSE notified.    GOALS:   Multidisciplinary Problems     Physical Therapy Goals     Not on file                History:     Past Medical History:   Diagnosis Date    Facial fracture     Medical history non-contributory     Scrotal abscess        Past Surgical History:   Procedure Laterality Date    None      ORBITAL RECONSTRUCTION      SCROTAL SURGERY         Time Tracking:     PT Received On: 03/12/20  PT Start Time: 0805     PT Stop Time: 0830  PT Total Time (min): 25 min     Billable Minutes: Evaluation 15 and Gait Training 10      Teodora Gatica, PT  03/12/2020

## 2020-03-12 NOTE — PLAN OF CARE
Provided SA resources from [facility]. Patient receptive. Also provided contact information and updated white communication board.       03/12/20 1156   Post-Acute Status   Post-Acute Authorization Other   Other Status Community Services   Discharge Delays (!) Other

## 2020-03-12 NOTE — DISCHARGE SUMMARY
[facility] Hospital Medicine  Discharge Summary      Patient Name: Maureen Luther  MRN: [id number]  Admission Date: 3/9/2020  Hospital Length of Stay: 2 days  Discharge Date and Time: 3/12/2020 12:30 PM  Attending Physician: Belia att. providers found   Discharging Provider: Rafael Knight MD  Primary Care Provider: Primary Doctor No      HPI:   59 year old man with history of tobacco abuse ,facial fracture,  asthma, and asbestos exposure who presents to the Hospital  for evaluation of SOB which onset suddenly this evening.  He used albuterol inhaler for his asthma symptoms without sustained relief.   Initial Ed vitals-  Initial Vitals [03/09/20 2234]   BP Pulse Resp Temp SpO2   (!) 180/112 97 (!) 21 98 °F (36.7 °C) 96 %         He continued to have worsening dyspnoea and was initially placed on bipap and was subsequently intubated.  ABG showed PH -7.1, Pco2-85.  Previous records showed urine toxicology  in 2019- was positive for cocaine,THC.      * No surgery found *      Hospital Course:   Patient was admitted for acute asthma exacerbation requiring intubation.  He was monitored in ICU and extubate when appropriate.  Breathing treatments would continue steroids were on board.  Of note patient had leukocytosis during stay likely secondary to steroid use no signs of infection noted patient remained afebrile.  He was step-down from the ICU in stable in the floor.  UDS was positive for cocaine and opioids.  Drug cessation counseling was offered.  Patient was discharged.      Consults:   Consults (From admission, onward)        Status Ordering Provider     Inpatient consult to Registered Dietitian/Nutritionist  Once     Provider:  (Not yet assigned)    Completed LUIS CARLOS POP     Inpatient consult to Registered Dietitian/Nutritionist  Once     Provider:  (Not yet assigned)    Completed LUIS CARLOS CHEN          No new Assessment & Plan notes have been filed under this hospital service since the last note was  generated.  Service: Hospital Medicine    Final Active Diagnoses:    Diagnosis Date Noted POA    Uncontrolled hypertension [I10] 03/10/2020 Yes    Cocaine abuse with THC use and abuse of prescription opiods [F14.10] 03/10/2020 Yes     Chronic    Tobacco abuse [Z72.0] 07/21/2016 Yes     Chronic      Problems Resolved During this Admission:    Diagnosis Date Noted Date Resolved POA    PRINCIPAL PROBLEM:  Acute respiratory failure with hypoxia and hypercapnia [J96.01, J96.02] 03/10/2020 03/12/2020 Yes    Asthma with acute exacerbation [J45.901] 03/10/2020 03/12/2020 Yes       Discharged Condition: good    Disposition: Home or Self Care    Follow Up:  Follow-up Information     Primary Doctor No In 1 week.               Patient Instructions:   No discharge procedures on file.    Significant Diagnostic Studies:   Results for orders placed or performed during the hospital encounter of 03/09/20   Influenza A & B by Molecular   Result Value Ref Range    Influenza A, Molecular Negative Negative    Influenza B, Molecular Negative Negative    Flu A & B Source Nasal swab    CBC auto differential   Result Value Ref Range    WBC 8.90 3.90 - 12.70 K/uL    RBC 4.81 4.60 - 6.20 M/uL    Hemoglobin 14.7 14.0 - 18.0 g/dL    Hematocrit 44.3 40.0 - 54.0 %    Mean Corpuscular Volume 92 82 - 98 fL    Mean Corpuscular Hemoglobin 30.6 27.0 - 31.0 pg    Mean Corpuscular Hemoglobin Conc 33.2 32.0 - 36.0 g/dL    RDW 11.9 11.5 - 14.5 %    Platelets 292 150 - 350 K/uL    MPV 9.0 (L) 9.2 - 12.9 fL    Immature Granulocytes 0.3 0.0 - 0.5 %    Gran # (ANC) 4.8 1.8 - 7.7 K/uL    Immature Grans (Abs) 0.03 0.00 - 0.04 K/uL    Lymph # 1.7 1.0 - 4.8 K/uL    Mono # 0.4 0.3 - 1.0 K/uL    Eos # 1.9 (H) 0.0 - 0.5 K/uL    Baso # 0.08 0.00 - 0.20 K/uL    nRBC 0 0 /100 WBC    Gran% 53.9 38.0 - 73.0 %    Lymph% 19.4 18.0 - 48.0 %    Mono% 4.0 4.0 - 15.0 %    Eosinophil% 21.5 (H) 0.0 - 8.0 %    Basophil% 0.9 0.0 - 1.9 %    Differential Method Automated     Comprehensive metabolic panel   Result Value Ref Range    Sodium 140 136 - 145 mmol/L    Potassium 4.4 3.5 - 5.1 mmol/L    Chloride 106 95 - 110 mmol/L    CO2 27 23 - 29 mmol/L    Glucose 92 70 - 110 mg/dL    BUN, Bld 12 6 - 20 mg/dL    Creatinine 1.1 0.5 - 1.4 mg/dL    Calcium 10.1 8.7 - 10.5 mg/dL    Total Protein 7.8 6.0 - 8.4 g/dL    Albumin 4.2 3.5 - 5.2 g/dL    Total Bilirubin 0.4 0.1 - 1.0 mg/dL    Alkaline Phosphatase 86 55 - 135 U/L    AST 26 10 - 40 U/L    ALT 28 10 - 44 U/L    Anion Gap 7 (L) 8 - 16 mmol/L    eGFR if African American >60 >60 mL/min/1.73 m^2    eGFR if non African American >60 >60 mL/min/1.73 m^2   Troponin I   Result Value Ref Range    Troponin I 0.010 0.000 - 0.026 ng/mL   Brain natriuretic peptide   Result Value Ref Range    BNP 19 0 - 99 pg/mL   Drug screen panel, emergency   Result Value Ref Range    Benzodiazepines Negative     Methadone metabolites Negative     Cocaine (Metab.) Presumptive Positive     Opiate Scrn, Ur Presumptive Positive     Barbiturate Screen, Ur Negative     Amphetamine Screen, Ur Negative     THC Negative     Phencyclidine Negative     Creatinine, Random Ur 41.7 23.0 - 375.0 mg/dL    Toxicology Information SEE COMMENT    D dimer, quantitative   Result Value Ref Range    D-Dimer 1.15 (H) <0.50 mg/L FEU   Comprehensive metabolic panel   Result Value Ref Range    Sodium 140 136 - 145 mmol/L    Potassium 4.6 3.5 - 5.1 mmol/L    Chloride 109 95 - 110 mmol/L    CO2 20 (L) 23 - 29 mmol/L    Glucose 134 (H) 70 - 110 mg/dL    BUN, Bld 24 (H) 6 - 20 mg/dL    Creatinine 1.2 0.5 - 1.4 mg/dL    Calcium 9.2 8.7 - 10.5 mg/dL    Total Protein 6.4 6.0 - 8.4 g/dL    Albumin 3.3 (L) 3.5 - 5.2 g/dL    Total Bilirubin 0.4 0.1 - 1.0 mg/dL    Alkaline Phosphatase 77 55 - 135 U/L    AST 21 10 - 40 U/L    ALT 35 10 - 44 U/L    Anion Gap 11 8 - 16 mmol/L    eGFR if African American >60 >60 mL/min/1.73 m^2    eGFR if non African American >60 >60 mL/min/1.73 m^2   CBC auto differential    Result Value Ref Range    WBC 13.34 (H) 3.90 - 12.70 K/uL    RBC 4.22 (L) 4.60 - 6.20 M/uL    Hemoglobin 12.9 (L) 14.0 - 18.0 g/dL    Hematocrit 40.9 40.0 - 54.0 %    Mean Corpuscular Volume 97 82 - 98 fL    Mean Corpuscular Hemoglobin 30.6 27.0 - 31.0 pg    Mean Corpuscular Hemoglobin Conc 31.5 (L) 32.0 - 36.0 g/dL    RDW 12.4 11.5 - 14.5 %    Platelets 234 150 - 350 K/uL    MPV 9.5 9.2 - 12.9 fL    Immature Granulocytes 0.5 0.0 - 0.5 %    Gran # (ANC) 12.1 (H) 1.8 - 7.7 K/uL    Immature Grans (Abs) 0.07 (H) 0.00 - 0.04 K/uL    Lymph # 0.9 (L) 1.0 - 4.8 K/uL    Mono # 0.2 (L) 0.3 - 1.0 K/uL    Eos # 0.0 0.0 - 0.5 K/uL    Baso # 0.01 0.00 - 0.20 K/uL    nRBC 0 0 /100 WBC    Gran% 90.9 (H) 38.0 - 73.0 %    Lymph% 6.9 (L) 18.0 - 48.0 %    Mono% 1.5 (L) 4.0 - 15.0 %    Eosinophil% 0.1 0.0 - 8.0 %    Basophil% 0.1 0.0 - 1.9 %    Differential Method Automated    Magnesium   Result Value Ref Range    Magnesium 2.3 1.6 - 2.6 mg/dL   Comprehensive metabolic panel   Result Value Ref Range    Sodium 141 136 - 145 mmol/L    Potassium 4.4 3.5 - 5.1 mmol/L    Chloride 106 95 - 110 mmol/L    CO2 25 23 - 29 mmol/L    Glucose 130 (H) 70 - 110 mg/dL    BUN, Bld 29 (H) 6 - 20 mg/dL    Creatinine 1.2 0.5 - 1.4 mg/dL    Calcium 9.5 8.7 - 10.5 mg/dL    Total Protein 6.8 6.0 - 8.4 g/dL    Albumin 3.5 3.5 - 5.2 g/dL    Total Bilirubin 0.5 0.1 - 1.0 mg/dL    Alkaline Phosphatase 82 55 - 135 U/L    AST 20 10 - 40 U/L    ALT 31 10 - 44 U/L    Anion Gap 10 8 - 16 mmol/L    eGFR if African American >60 >60 mL/min/1.73 m^2    eGFR if non African American >60 >60 mL/min/1.73 m^2   CBC auto differential   Result Value Ref Range    WBC 18.95 (H) 3.90 - 12.70 K/uL    RBC 4.45 (L) 4.60 - 6.20 M/uL    Hemoglobin 13.4 (L) 14.0 - 18.0 g/dL    Hematocrit 41.3 40.0 - 54.0 %    Mean Corpuscular Volume 93 82 - 98 fL    Mean Corpuscular Hemoglobin 30.1 27.0 - 31.0 pg    Mean Corpuscular Hemoglobin Conc 32.4 32.0 - 36.0 g/dL    RDW 12.0 11.5 - 14.5 %     Platelets 282 150 - 350 K/uL    MPV 9.1 (L) 9.2 - 12.9 fL    Immature Granulocytes 0.8 (H) 0.0 - 0.5 %    Gran # (ANC) 17.9 (H) 1.8 - 7.7 K/uL    Immature Grans (Abs) 0.16 (H) 0.00 - 0.04 K/uL    Lymph # 0.6 (L) 1.0 - 4.8 K/uL    Mono # 0.3 0.3 - 1.0 K/uL    Eos # 0.0 0.0 - 0.5 K/uL    Baso # 0.02 0.00 - 0.20 K/uL    nRBC 0 0 /100 WBC    Gran% 94.4 (H) 38.0 - 73.0 %    Lymph% 3.2 (L) 18.0 - 48.0 %    Mono% 1.5 (L) 4.0 - 15.0 %    Eosinophil% 0.0 0.0 - 8.0 %    Basophil% 0.1 0.0 - 1.9 %    Differential Method Automated    ISTAT PROCEDURE   Result Value Ref Range    POC PH 7.155 (LL) 7.35 - 7.45    POC PCO2 85.9 (HH) 35 - 45 mmHg    POC PO2 616 (H) 80 - 100 mmHg    POC HCO3 30.3 (H) 24 - 28 mmol/L    POC BE 1 -2 to 2 mmol/L    POC SATURATED O2 100 95 - 100 %    Rate 16     Sample ARTERIAL     Site RR     Allens Test Pass     DelSys Adult Vent     Mode AC/PRVC     Vt 400     PEEP 5     FiO2 100    ISTAT PROCEDURE   Result Value Ref Range    POC PH 7.197 (LL) 7.35 - 7.45    POC PCO2 72.6 (HH) 35 - 45 mmHg    POC PO2 147 (H) 80 - 100 mmHg    POC HCO3 28.1 (H) 24 - 28 mmol/L    POC BE 0 -2 to 2 mmol/L    POC SATURATED O2 99 95 - 100 %    Rate 18     Sample ARTERIAL     Site RR     Allens Test Pass     DelSys Adult Vent     Mode AC/PRVC     Vt 400     PEEP 5     FiO2 50    ISTAT PROCEDURE   Result Value Ref Range    POC PH 7.367 7.35 - 7.45    POC PCO2 42.5 35 - 45 mmHg    POC PO2 68 (L) 80 - 100 mmHg    POC HCO3 24.4 24 - 28 mmol/L    POC BE -1 -2 to 2 mmol/L    POC SATURATED O2 93 (L) 95 - 100 %    Rate 24     Sample ARTERIAL     Site RR     Allens Test Pass     DelSys Adult Vent     Mode AC/PRVC     PEEP 5     FiO2 35    POCT glucose   Result Value Ref Range    POCT Glucose 119 (H) 70 - 110 mg/dL   ISTAT PROCEDURE   Result Value Ref Range    POC PH 7.380 7.35 - 7.45    POC PCO2 39.7 35 - 45 mmHg    POC PO2 76 (L) 80 - 100 mmHg    POC HCO3 23.5 (L) 24 - 28 mmol/L    POC BE -2 -2 to 2 mmol/L    POC SATURATED O2 95 95 -  100 %    Rate 20     Sample ARTERIAL     Site LR     Allens Test Pass     DelSys Adult Vent     Mode PCV     PEEP 5     FiO2 35     IP 17     IT .9     X-Ray Chest AP Portable  Narrative: EXAMINATION:  XR CHEST AP PORTABLE    CLINICAL HISTORY:  resp failure;    FINDINGS:  Single view of the chest.  Comparison 03/10/2020.  Endotracheal and nasogastric tubes appear satisfactory.  Multiple leads overlie the chest.    Cardiac silhouette is normal.  The lungs demonstrate no evidence of active disease.  No evidence of pleural effusion or pneumothorax.  Bones appear intact.  Impression: No acute process seen.    Electronically signed by: Pj Jose MD  Date:    03/11/2020  Time:    06:45        Pending Diagnostic Studies:     None         Medications:  Reconciled Home Medications:      Medication List      START taking these medications    amLODIPine 10 MG tablet  Commonly known as:  NORVASC  Take 1 tablet (10 mg total) by mouth once daily.  Start taking on:  March 13, 2020        CONTINUE taking these medications    albuterol 90 mcg/actuation inhaler  Commonly known as:  PROVENTIL/VENTOLIN HFA     atenoloL 50 MG tablet  Commonly known as:  TENORMIN     buPROPion 100 MG tablet  Commonly known as:  WELLBUTRIN     hydroCHLOROthiazide 12.5 MG Tab  Commonly known as:  HYDRODIURIL     hydroxyzine HCL 25 MG tablet  Commonly known as:  ATARAX  Take 1 tablet (25 mg total) by mouth every 6 (six) hours.     naproxen 500 MG tablet  Commonly known as:  NAPROSYN     ondansetron 4 MG tablet  Commonly known as:  ZOFRAN  Take 1 tablet (4 mg total) by mouth every 6 (six) hours.     predniSONE 20 MG tablet  Commonly known as:  DELTASONE     terazosin 2 MG capsule  Commonly known as:  HYTRIN        STOP taking these medications    doxycycline 100 MG tablet  Commonly known as:  VIBRA-TABS            Indwelling Lines/Drains at time of discharge:   Lines/Drains/Airways     None                 Time spent on the discharge of patient: 40  minutes  Patient was seen and examined on the date of discharge and determined to be suitable for discharge.         Rafael Knight MD  Department of Hospital Medicine  [facility]

## 2020-03-12 NOTE — PROGRESS NOTES
Transferred pt to room 241 via wheelchair.  Pt tolerated transfer well.  ALFONZO Sharma at bedside.  Report given at bedside.

## 2020-03-13 NOTE — PLAN OF CARE
03/13/20 1403   Final Note   Assessment Type Final Discharge Note   Anticipated Discharge Disposition Home   Right Care Referral Info   Post Acute Recommendation No Care

## 2020-04-04 ENCOUNTER — HOSPITAL ENCOUNTER (EMERGENCY)
Facility: HOSPITAL | Age: 60
Discharge: HOME | End: 2020-04-04
Attending: FAMILY MEDICINE
Payer: MEDICAID

## 2020-04-04 VITALS
OXYGEN SATURATION: 96 % | BODY MASS INDEX: 23.85 KG/M2 | TEMPERATURE: 98 F | SYSTOLIC BLOOD PRESSURE: 126 MMHG | DIASTOLIC BLOOD PRESSURE: 75 MMHG | RESPIRATION RATE: 20 BRPM | HEART RATE: 92 BPM | WEIGHT: 161 LBS | HEIGHT: 69 IN

## 2020-04-04 DIAGNOSIS — J45.41 MODERATE PERSISTENT ASTHMA WITH EXACERBATION: Primary | ICD-10-CM

## 2020-04-04 DIAGNOSIS — R06.02 SHORTNESS OF BREATH: ICD-10-CM

## 2020-04-04 LAB
ALBUMIN SERPL BCP-MCNC: 4 G/DL (ref 3.5–5.2)
ALLENS TEST: ABNORMAL
ALP SERPL-CCNC: 91 U/L (ref 55–135)
ALT SERPL W/O P-5'-P-CCNC: 42 U/L (ref 10–44)
AMPHET+METHAMPHET UR QL: NEGATIVE
ANION GAP SERPL CALC-SCNC: 12 MMOL/L (ref 8–16)
AST SERPL-CCNC: 43 U/L (ref 10–40)
BARBITURATES UR QL SCN>200 NG/ML: NEGATIVE
BASOPHILS # BLD AUTO: 0.04 K/UL (ref 0–0.2)
BASOPHILS NFR BLD: 0.4 % (ref 0–1.9)
BENZODIAZ UR QL SCN>200 NG/ML: NEGATIVE
BILIRUB SERPL-MCNC: 0.5 MG/DL (ref 0.1–1)
BILIRUB UR QL STRIP: NEGATIVE
BNP SERPL-MCNC: 11 PG/ML (ref 0–99)
BUN SERPL-MCNC: 23 MG/DL (ref 6–20)
BZE UR QL SCN: NORMAL
CALCIUM SERPL-MCNC: 9.6 MG/DL (ref 8.7–10.5)
CANNABINOIDS UR QL SCN: NEGATIVE
CHLORIDE SERPL-SCNC: 108 MMOL/L (ref 95–110)
CLARITY UR: CLEAR
CO2 SERPL-SCNC: 20 MMOL/L (ref 23–29)
COLOR UR: YELLOW
CREAT SERPL-MCNC: 1.8 MG/DL (ref 0.5–1.4)
CREAT UR-MCNC: 65.9 MG/DL (ref 23–375)
DELSYS: ABNORMAL
DIFFERENTIAL METHOD: ABNORMAL
EOSINOPHIL # BLD AUTO: 1.9 K/UL (ref 0–0.5)
EOSINOPHIL NFR BLD: 20.3 % (ref 0–8)
ERYTHROCYTE [DISTWIDTH] IN BLOOD BY AUTOMATED COUNT: 12.7 % (ref 11.5–14.5)
EST. GFR  (AFRICAN AMERICAN): 47 ML/MIN/1.73 M^2
EST. GFR  (NON AFRICAN AMERICAN): 41 ML/MIN/1.73 M^2
FIO2: 32
FLOW: 3
GLUCOSE SERPL-MCNC: 75 MG/DL (ref 70–110)
GLUCOSE UR QL STRIP: NEGATIVE
HCO3 UR-SCNC: 22.2 MMOL/L (ref 24–28)
HCT VFR BLD AUTO: 43.9 % (ref 40–54)
HGB BLD-MCNC: 14.5 G/DL (ref 14–18)
HGB UR QL STRIP: ABNORMAL
IMM GRANULOCYTES # BLD AUTO: 0.02 K/UL (ref 0–0.04)
IMM GRANULOCYTES NFR BLD AUTO: 0.2 % (ref 0–0.5)
KETONES UR QL STRIP: NEGATIVE
LEUKOCYTE ESTERASE UR QL STRIP: NEGATIVE
LYMPHOCYTES # BLD AUTO: 1.8 K/UL (ref 1–4.8)
LYMPHOCYTES NFR BLD: 19 % (ref 18–48)
MCH RBC QN AUTO: 31 PG (ref 27–31)
MCHC RBC AUTO-ENTMCNC: 33 G/DL (ref 32–36)
MCV RBC AUTO: 94 FL (ref 82–98)
METHADONE UR QL SCN>300 NG/ML: NEGATIVE
MODE: ABNORMAL
MONOCYTES # BLD AUTO: 0.4 K/UL (ref 0.3–1)
MONOCYTES NFR BLD: 3.9 % (ref 4–15)
NEUTROPHILS # BLD AUTO: 5.3 K/UL (ref 1.8–7.7)
NEUTROPHILS NFR BLD: 56.2 % (ref 38–73)
NITRITE UR QL STRIP: NEGATIVE
NRBC BLD-RTO: 0 /100 WBC
OPIATES UR QL SCN: NEGATIVE
PCO2 BLDA: 38.9 MMHG (ref 35–45)
PCP UR QL SCN>25 NG/ML: NEGATIVE
PH SMN: 7.36 [PH] (ref 7.35–7.45)
PH UR STRIP: 7 [PH] (ref 5–8)
PLATELET # BLD AUTO: 300 K/UL (ref 150–350)
PMV BLD AUTO: 9.5 FL (ref 9.2–12.9)
PO2 BLDA: 82 MMHG (ref 80–100)
POC BE: -3 MMOL/L
POC SATURATED O2: 96 % (ref 95–100)
POTASSIUM SERPL-SCNC: 5 MMOL/L (ref 3.5–5.1)
PROT SERPL-MCNC: 7.9 G/DL (ref 6–8.4)
PROT UR QL STRIP: NEGATIVE
RBC # BLD AUTO: 4.67 M/UL (ref 4.6–6.2)
SAMPLE: ABNORMAL
SARS-COV-2 RNA AMPLIFICATION, QUAL: NEGATIVE
SITE: ABNORMAL
SODIUM SERPL-SCNC: 140 MMOL/L (ref 136–145)
SP GR UR STRIP: 1.02 (ref 1–1.03)
TOXICOLOGY INFORMATION: NORMAL
TROPONIN I SERPL DL<=0.01 NG/ML-MCNC: <0.006 NG/ML (ref 0–0.03)
URN SPEC COLLECT METH UR: ABNORMAL
UROBILINOGEN UR STRIP-ACNC: NEGATIVE EU/DL
WBC # BLD AUTO: 9.41 K/UL (ref 3.9–12.7)

## 2020-04-04 PROCEDURE — U0002 COVID-19 LAB TEST NON-CDC: HCPCS

## 2020-04-04 PROCEDURE — 82803 BLOOD GASES ANY COMBINATION: CPT

## 2020-04-04 PROCEDURE — 84484 ASSAY OF TROPONIN QUANT: CPT

## 2020-04-04 PROCEDURE — 63600175 PHARM REV CODE 636 W HCPCS: Performed by: FAMILY MEDICINE

## 2020-04-04 PROCEDURE — 99285 EMERGENCY DEPT VISIT HI MDM: CPT | Mod: 25

## 2020-04-04 PROCEDURE — 94640 AIRWAY INHALATION TREATMENT: CPT

## 2020-04-04 PROCEDURE — 25000242 PHARM REV CODE 250 ALT 637 W/ HCPCS: Performed by: FAMILY MEDICINE

## 2020-04-04 PROCEDURE — 80053 COMPREHEN METABOLIC PANEL: CPT

## 2020-04-04 PROCEDURE — 80307 DRUG TEST PRSMV CHEM ANLYZR: CPT

## 2020-04-04 PROCEDURE — 81003 URINALYSIS AUTO W/O SCOPE: CPT | Mod: 59

## 2020-04-04 PROCEDURE — 99900035 HC TECH TIME PER 15 MIN (STAT)

## 2020-04-04 PROCEDURE — 85025 COMPLETE CBC W/AUTO DIFF WBC: CPT

## 2020-04-04 PROCEDURE — 27000221 HC OXYGEN, UP TO 24 HOURS

## 2020-04-04 PROCEDURE — 36600 WITHDRAWAL OF ARTERIAL BLOOD: CPT

## 2020-04-04 PROCEDURE — 36415 COLL VENOUS BLD VENIPUNCTURE: CPT

## 2020-04-04 PROCEDURE — 83880 ASSAY OF NATRIURETIC PEPTIDE: CPT

## 2020-04-04 PROCEDURE — 96374 THER/PROPH/DIAG INJ IV PUSH: CPT

## 2020-04-04 RX ORDER — PREDNISONE 20 MG/1
20 TABLET ORAL DAILY
Qty: 30 TABLET | Refills: 0 | Status: SHIPPED | OUTPATIENT
Start: 2020-04-04 | End: 2020-05-04

## 2020-04-04 RX ORDER — IPRATROPIUM BROMIDE AND ALBUTEROL SULFATE 2.5; .5 MG/3ML; MG/3ML
3 SOLUTION RESPIRATORY (INHALATION)
Status: COMPLETED | OUTPATIENT
Start: 2020-04-04 | End: 2020-04-04

## 2020-04-04 RX ORDER — METHYLPREDNISOLONE SOD SUCC 125 MG
125 VIAL (EA) INJECTION
Status: DISCONTINUED | OUTPATIENT
Start: 2020-04-04 | End: 2020-04-04

## 2020-04-04 RX ORDER — METHYLPREDNISOLONE SOD SUCC 125 MG
125 VIAL (EA) INJECTION
Status: COMPLETED | OUTPATIENT
Start: 2020-04-04 | End: 2020-04-04

## 2020-04-04 RX ADMIN — IPRATROPIUM BROMIDE AND ALBUTEROL SULFATE 3 ML: .5; 3 SOLUTION RESPIRATORY (INHALATION) at 09:04

## 2020-04-04 RX ADMIN — METHYLPREDNISOLONE SODIUM SUCCINATE 125 MG: 125 INJECTION, POWDER, FOR SOLUTION INTRAMUSCULAR; INTRAVENOUS at 09:04

## 2020-04-05 NOTE — ED PROVIDER NOTES
SCRIBE #1 NOTE: I, Shanice Mccurdy, am scribing for, and in the presence of, Teodora Worthy MD. I have scribed the entire note.       History     Chief Complaint   Patient presents with    Asthma     denies fever, body aches. Reports cough. Reports numerous negative covid tests at [facility].      Review of patient's allergies indicates:  No Known Allergies      History of Present Illness     HPI    4/4/2020, 8:09 PM  History obtained from the patient      History of Present Illness: Maureen Luther is a 60 y.o. male patient with a h/o facial fracture, scrotal abscess, who presents to the Emergency Department for evaluation of asthma which onset a couple of days ago. Pt reports having numerous negative COVID tests at [facility]. Symptoms are constant and moderate in severity. No mitigating or exacerbating factors reported. Associated sxs include cough and wheezing. Patient denies any fever, generalized body aches, sore throat, SOB, CP, n/v/d, dysuria, back pain, rash, HA, bruises/blds easily, and all other sxs at this time. Prior Tx includes prednisone 20mg x 1 day, but pt states he has ran out. No further complaints or concerns at this time.       Arrival mode: Personal transportation      PCP: Primary Doctor No      Past Medical History:  Past Medical History:   Diagnosis Date    Facial fracture     Medical history non-contributory     Scrotal abscess        Past Surgical History:  Past Surgical History:   Procedure Laterality Date    None      ORBITAL RECONSTRUCTION      SCROTAL SURGERY           Family History:  Family History   Problem Relation Age of Onset    Hypertension Unknown        Social History:   Social History     Tobacco Use    Smoking status: Current Every Day Smoker     Packs/day: 1.00     Types: Cigarettes   Substance and Sexual Activity    Alcohol use: No    Drug use: Yes     Types: Marijuana    Sexual activity: Unknown         Review of Systems     Review of  Systems   Constitutional: Negative for fever.        (+) generalized body aches   HENT: Negative for sore throat.    Respiratory: Positive for cough and wheezing. Negative for shortness of breath.         (+) asthma   Cardiovascular: Negative for chest pain.   Gastrointestinal: Negative for diarrhea, nausea and vomiting.   Genitourinary: Negative for dysuria.   Musculoskeletal: Negative for back pain.   Skin: Negative for rash.   Neurological: Negative for headaches.   Hematological: Does not bruise/bleed easily.   All other systems reviewed and are negative.       Physical Exam     Initial Vitals [04/04/20 1947]   BP Pulse Resp Temp SpO2   120/84 96 18 98.1 °F (36.7 °C) 95 %      MAP       --          Physical Exam  Nursing Notes and Vital Signs Reviewed.  Constitutional: Well-developed and well-nourished.   Head: Atraumatic. Normocephalic.  Eyes: EOM intact. No scleral icterus.  ENT: Mucous membranes are moist. Oropharynx is clear and symmetric.    Neck: Supple. Full ROM. No lymphadenopathy.  Cardiovascular: Regular rate. Regular rhythm. No murmurs, rubs, or gallops. Distal pulses are 2+ and symmetric.  Pulmonary/Chest: No respiratory distress. Inspiratory and expiratory wheezing bilaterally. No rales.  Abdominal: Soft and non-distended.  There is no tenderness.  No rebound, guarding, or rigidity. Good bowel sounds.  Genitourinary: No CVA tenderness  Musculoskeletal: Moves all extremities. No obvious deformities. No calf tenderness.  Skin: Warm and dry.  Neurological:  Alert, awake, and appropriate.  Normal speech.  No acute focal neurological deficits are appreciated.  Psychiatric: Normal affect. Good eye contact. Appropriate in content.     ED Course   Procedures  ED Vital Signs:  Vitals:    04/04/20 1947 04/04/20 2130 04/04/20 2150 04/04/20 2220   BP: 120/84 (!) 136/93  133/68   Pulse: 96 109 96 93   Resp: 18 (!) 25 18 (!) 29   Temp: 98.1 °F (36.7 °C)      TempSrc: Oral      SpO2: 95% 95% 97% 100%   Weight: 73  "kg (161 lb)      Height: 5' 9" (1.753 m)       04/04/20 2230 04/04/20 2241 04/04/20 2300 04/04/20 2330   BP: 123/68  125/76 126/75   Pulse: 88 88 85 92   Resp: (!) 22  (!) 22 20   Temp:    98.2 °F (36.8 °C)   TempSrc:    Oral   SpO2: 98%  100% 96%   Weight:       Height:           Abnormal Lab Results:  Labs Reviewed   CBC W/ AUTO DIFFERENTIAL - Abnormal; Notable for the following components:       Result Value    Eos # 1.9 (*)     Mono% 3.9 (*)     Eosinophil% 20.3 (*)     All other components within normal limits   COMPREHENSIVE METABOLIC PANEL - Abnormal; Notable for the following components:    CO2 20 (*)     BUN, Bld 23 (*)     Creatinine 1.8 (*)     AST 43 (*)     eGFR if  47 (*)     eGFR if non  41 (*)     All other components within normal limits   URINALYSIS - Abnormal; Notable for the following components:    Occult Blood UA Trace (*)     All other components within normal limits   ISTAT PROCEDURE - Abnormal; Notable for the following components:    POC HCO3 22.2 (*)     All other components within normal limits   SARS-COV-2 RNA AMPLIFICATION, QUAL   TROPONIN I   B-TYPE NATRIURETIC PEPTIDE   DRUG SCREEN PANEL, URINE EMERGENCY        All Lab Results:  Results for orders placed or performed during the hospital encounter of 04/04/20   SARS-CoV-2 RNA, Rapid Amplification, Qual   Result Value Ref Range    SARS-CoV-2 RNA, Amplification, Qual Negative Negative   CBC auto differential   Result Value Ref Range    WBC 9.41 3.90 - 12.70 K/uL    RBC 4.67 4.60 - 6.20 M/uL    Hemoglobin 14.5 14.0 - 18.0 g/dL    Hematocrit 43.9 40.0 - 54.0 %    Mean Corpuscular Volume 94 82 - 98 fL    Mean Corpuscular Hemoglobin 31.0 27.0 - 31.0 pg    Mean Corpuscular Hemoglobin Conc 33.0 32.0 - 36.0 g/dL    RDW 12.7 11.5 - 14.5 %    Platelets 300 150 - 350 K/uL    MPV 9.5 9.2 - 12.9 fL    Immature Granulocytes 0.2 0.0 - 0.5 %    Gran # (ANC) 5.3 1.8 - 7.7 K/uL    Immature Grans (Abs) 0.02 0.00 - 0.04 K/uL "    Lymph # 1.8 1.0 - 4.8 K/uL    Mono # 0.4 0.3 - 1.0 K/uL    Eos # 1.9 (H) 0.0 - 0.5 K/uL    Baso # 0.04 0.00 - 0.20 K/uL    nRBC 0 0 /100 WBC    Gran% 56.2 38.0 - 73.0 %    Lymph% 19.0 18.0 - 48.0 %    Mono% 3.9 (L) 4.0 - 15.0 %    Eosinophil% 20.3 (H) 0.0 - 8.0 %    Basophil% 0.4 0.0 - 1.9 %    Differential Method Automated    Comprehensive metabolic panel   Result Value Ref Range    Sodium 140 136 - 145 mmol/L    Potassium 5.0 3.5 - 5.1 mmol/L    Chloride 108 95 - 110 mmol/L    CO2 20 (L) 23 - 29 mmol/L    Glucose 75 70 - 110 mg/dL    BUN, Bld 23 (H) 6 - 20 mg/dL    Creatinine 1.8 (H) 0.5 - 1.4 mg/dL    Calcium 9.6 8.7 - 10.5 mg/dL    Total Protein 7.9 6.0 - 8.4 g/dL    Albumin 4.0 3.5 - 5.2 g/dL    Total Bilirubin 0.5 0.1 - 1.0 mg/dL    Alkaline Phosphatase 91 55 - 135 U/L    AST 43 (H) 10 - 40 U/L    ALT 42 10 - 44 U/L    Anion Gap 12 8 - 16 mmol/L    eGFR if African American 47 (A) >60 mL/min/1.73 m^2    eGFR if non African American 41 (A) >60 mL/min/1.73 m^2   Urinalysis - Clean Catch   Result Value Ref Range    Specimen UA Urine, Clean Catch     Color, UA Yellow Yellow, Straw, Mariana    Appearance, UA Clear Clear    pH, UA 7.0 5.0 - 8.0    Specific Gravity, UA 1.020 1.005 - 1.030    Protein, UA Negative Negative    Glucose, UA Negative Negative    Ketones, UA Negative Negative    Bilirubin (UA) Negative Negative    Occult Blood UA Trace (A) Negative    Nitrite, UA Negative Negative    Urobilinogen, UA Negative <2.0 EU/dL    Leukocytes, UA Negative Negative   Troponin I   Result Value Ref Range    Troponin I <0.006 0.000 - 0.026 ng/mL   B-Type natriuretic peptide (BNP)   Result Value Ref Range    BNP 11 0 - 99 pg/mL   Drug screen panel, emergency   Result Value Ref Range    Benzodiazepines Negative     Methadone metabolites Negative     Cocaine (Metab.) Presumptive Positive     Opiate Scrn, Ur Negative     Barbiturate Screen, Ur Negative     Amphetamine Screen, Ur Negative     THC Negative     Phencyclidine  Negative     Creatinine, Random Ur 65.9 23.0 - 375.0 mg/dL    Toxicology Information SEE COMMENT    ISTAT PROCEDURE   Result Value Ref Range    POC PH 7.363 7.35 - 7.45    POC PCO2 38.9 35 - 45 mmHg    POC PO2 82 80 - 100 mmHg    POC HCO3 22.2 (L) 24 - 28 mmol/L    POC BE -3 -2 to 2 mmol/L    POC SATURATED O2 96 95 - 100 %    Sample ARTERIAL     Site RR     Allens Test Pass     DelSys Nasal Can     Mode SPONT     Flow 3     FiO2 32        Imaging Results          X-Ray Chest AP Portable (Final result)  Result time 04/04/20 21:43:29    Final result by Phillip Reyes MD (04/04/20 21:43:29)                 Impression:      No acute findings.      Electronically signed by: Phillip Reyes  Date:    04/04/2020  Time:    21:43             Narrative:    EXAMINATION:  XR CHEST AP PORTABLE    CLINICAL HISTORY:  Chest Pain;    COMPARISON:  03/11/2020    FINDINGS:  Lung fields are clear.  Heart size is normal.    No pleural fluid or pneumothorax.  Right costophrenic angle excluded from the exam.    No adenopathy is identified.    No significant osseous abnormality.                              The EKG was ordered, reviewed, and independently interpreted by the ED provider.  Interpretation time: 21:16  Rate: 95 BPM  Rhythm: normal sinus rhythm  Interpretation: No acute ST changes. No STEMI.        The Emergency Provider reviewed the vital signs and test results, which are outlined above.     ED Discussion     10:50 PM: Reassessed pt at this time.  Pt states his condition has improved at this time. Discussed with pt all pertinent ED information and results. Discussed pt dx and plan of tx. Gave pt all f/u and return to the ED instructions. All questions and concerns were addressed at this time. Pt expresses understanding of information and instructions, and is comfortable with plan to discharge. Pt is stable for discharge.    I discussed with patient and/or family/caretaker that evaluation in the ED does not suggest any emergent or  life threatening medical conditions requiring immediate intervention beyond what was provided in the ED, and I believe patient is safe for discharge.  Regardless, an unremarkable evaluation in the ED does not preclude the development or presence of a serious of life threatening condition. As such, patient was instructed to return immediately for any worsening or change in current symptoms.       MDM        Medical Decision Making:   Clinical Tests:   Lab Tests: Ordered and Reviewed  Radiological Study: Ordered and Reviewed  Medical Tests: Ordered and Reviewed           ED Medication(s):  Medications   methylPREDNISolone sodium succinate injection 125 mg (125 mg Intravenous Given 4/4/20 2110)   albuterol-ipratropium 2.5 mg-0.5 mg/3 mL nebulizer solution 3 mL (3 mLs Nebulization Given 4/4/20 2150)       Discharge Medication List as of 4/4/2020  8:51 PM                  Scribe Attestation:   Scribe #1: I performed the above scribed service and the documentation accurately describes the services I performed. I attest to the accuracy of the note.     Attending:   Physician Attestation Statement for Scribe #1: I, Teodora Worthy MD, personally performed the services described in this documentation, as scribed by Shanice Mccurdy, in my presence, and it is both accurate and complete.           Clinical Impression       ICD-10-CM ICD-9-CM   1. Moderate persistent asthma with exacerbation J45.41 493.92   2. Shortness of breath R06.02 786.05       Disposition:   Disposition: Discharged  Condition: Stable         Teodora Worthy MD  04/05/20 2021

## 2020-04-25 ENCOUNTER — HOSPITAL ENCOUNTER (EMERGENCY)
Facility: HOSPITAL | Age: 60
Discharge: HOME | End: 2020-04-25
Attending: EMERGENCY MEDICINE
Payer: MEDICAID

## 2020-04-25 VITALS
HEIGHT: 69 IN | DIASTOLIC BLOOD PRESSURE: 72 MMHG | TEMPERATURE: 98 F | SYSTOLIC BLOOD PRESSURE: 142 MMHG | OXYGEN SATURATION: 97 % | RESPIRATION RATE: 21 BRPM | WEIGHT: 155 LBS | HEART RATE: 95 BPM | BODY MASS INDEX: 22.96 KG/M2

## 2020-04-25 DIAGNOSIS — J18.9 PNEUMONIA OF RIGHT LOWER LOBE DUE TO INFECTIOUS ORGANISM: ICD-10-CM

## 2020-04-25 DIAGNOSIS — J45.901 MILD ASTHMA WITH EXACERBATION, UNSPECIFIED WHETHER PERSISTENT: ICD-10-CM

## 2020-04-25 DIAGNOSIS — R06.00 DYSPNEA: Primary | ICD-10-CM

## 2020-04-25 LAB
ALBUMIN SERPL BCP-MCNC: 3.5 G/DL (ref 3.5–5.2)
ALP SERPL-CCNC: 69 U/L (ref 55–135)
ALT SERPL W/O P-5'-P-CCNC: 33 U/L (ref 10–44)
ANION GAP SERPL CALC-SCNC: 9 MMOL/L (ref 8–16)
AST SERPL-CCNC: 28 U/L (ref 10–40)
BASOPHILS # BLD AUTO: 0.03 K/UL (ref 0–0.2)
BASOPHILS NFR BLD: 0.3 % (ref 0–1.9)
BILIRUB SERPL-MCNC: 0.8 MG/DL (ref 0.1–1)
BNP SERPL-MCNC: 29 PG/ML (ref 0–99)
BUN SERPL-MCNC: 18 MG/DL (ref 6–20)
CALCIUM SERPL-MCNC: 8.5 MG/DL (ref 8.7–10.5)
CHLORIDE SERPL-SCNC: 110 MMOL/L (ref 95–110)
CO2 SERPL-SCNC: 22 MMOL/L (ref 23–29)
CREAT SERPL-MCNC: 1.3 MG/DL (ref 0.5–1.4)
DIFFERENTIAL METHOD: ABNORMAL
EOSINOPHIL # BLD AUTO: 1.3 K/UL (ref 0–0.5)
EOSINOPHIL NFR BLD: 14.4 % (ref 0–8)
ERYTHROCYTE [DISTWIDTH] IN BLOOD BY AUTOMATED COUNT: 13 % (ref 11.5–14.5)
EST. GFR  (AFRICAN AMERICAN): >60 ML/MIN/1.73 M^2
EST. GFR  (NON AFRICAN AMERICAN): >60 ML/MIN/1.73 M^2
GLUCOSE SERPL-MCNC: 77 MG/DL (ref 70–110)
HCT VFR BLD AUTO: 39.2 % (ref 40–54)
HGB BLD-MCNC: 12.7 G/DL (ref 14–18)
IMM GRANULOCYTES # BLD AUTO: 0.03 K/UL (ref 0–0.04)
IMM GRANULOCYTES NFR BLD AUTO: 0.3 % (ref 0–0.5)
LYMPHOCYTES # BLD AUTO: 1.7 K/UL (ref 1–4.8)
LYMPHOCYTES NFR BLD: 19.1 % (ref 18–48)
MCH RBC QN AUTO: 31.2 PG (ref 27–31)
MCHC RBC AUTO-ENTMCNC: 32.4 G/DL (ref 32–36)
MCV RBC AUTO: 96 FL (ref 82–98)
MONOCYTES # BLD AUTO: 0.5 K/UL (ref 0.3–1)
MONOCYTES NFR BLD: 5.4 % (ref 4–15)
NEUTROPHILS # BLD AUTO: 5.3 K/UL (ref 1.8–7.7)
NEUTROPHILS NFR BLD: 60.5 % (ref 38–73)
NRBC BLD-RTO: 0 /100 WBC
PLATELET # BLD AUTO: 217 K/UL (ref 150–350)
PMV BLD AUTO: 9.3 FL (ref 9.2–12.9)
POTASSIUM SERPL-SCNC: 4.6 MMOL/L (ref 3.5–5.1)
PROT SERPL-MCNC: 6.5 G/DL (ref 6–8.4)
RBC # BLD AUTO: 4.07 M/UL (ref 4.6–6.2)
SARS-COV-2 RDRP RESP QL NAA+PROBE: NEGATIVE
SODIUM SERPL-SCNC: 141 MMOL/L (ref 136–145)
TROPONIN I SERPL DL<=0.01 NG/ML-MCNC: 0.01 NG/ML (ref 0–0.03)
WBC # BLD AUTO: 8.84 K/UL (ref 3.9–12.7)

## 2020-04-25 PROCEDURE — U0002 COVID-19 LAB TEST NON-CDC: HCPCS

## 2020-04-25 PROCEDURE — 84484 ASSAY OF TROPONIN QUANT: CPT

## 2020-04-25 PROCEDURE — 93005 ELECTROCARDIOGRAM TRACING: CPT

## 2020-04-25 PROCEDURE — 99285 EMERGENCY DEPT VISIT HI MDM: CPT | Mod: 25

## 2020-04-25 PROCEDURE — 25000242 PHARM REV CODE 250 ALT 637 W/ HCPCS: Performed by: EMERGENCY MEDICINE

## 2020-04-25 PROCEDURE — 94640 AIRWAY INHALATION TREATMENT: CPT

## 2020-04-25 PROCEDURE — 80053 COMPREHEN METABOLIC PANEL: CPT

## 2020-04-25 PROCEDURE — 96372 THER/PROPH/DIAG INJ SC/IM: CPT

## 2020-04-25 PROCEDURE — 93010 ELECTROCARDIOGRAM REPORT: CPT | Mod: ,,, | Performed by: INTERNAL MEDICINE

## 2020-04-25 PROCEDURE — 27000221 HC OXYGEN, UP TO 24 HOURS

## 2020-04-25 PROCEDURE — 63600175 PHARM REV CODE 636 W HCPCS: Performed by: EMERGENCY MEDICINE

## 2020-04-25 PROCEDURE — 93010 EKG 12-LEAD: ICD-10-PCS | Mod: ,,, | Performed by: INTERNAL MEDICINE

## 2020-04-25 PROCEDURE — 83880 ASSAY OF NATRIURETIC PEPTIDE: CPT

## 2020-04-25 PROCEDURE — 85025 COMPLETE CBC W/AUTO DIFF WBC: CPT

## 2020-04-25 RX ORDER — TERBUTALINE SULFATE 1 MG/ML
0.25 INJECTION SUBCUTANEOUS ONCE
Status: COMPLETED | OUTPATIENT
Start: 2020-04-25 | End: 2020-04-25

## 2020-04-25 RX ORDER — PREDNISONE 20 MG/1
60 TABLET ORAL
Status: COMPLETED | OUTPATIENT
Start: 2020-04-25 | End: 2020-04-25

## 2020-04-25 RX ORDER — LEVOFLOXACIN 500 MG/1
500 TABLET, FILM COATED ORAL DAILY
Qty: 5 TABLET | Refills: 0 | Status: SHIPPED | OUTPATIENT
Start: 2020-04-25 | End: 2020-05-02

## 2020-04-25 RX ORDER — IPRATROPIUM BROMIDE AND ALBUTEROL SULFATE 2.5; .5 MG/3ML; MG/3ML
3 SOLUTION RESPIRATORY (INHALATION)
Status: COMPLETED | OUTPATIENT
Start: 2020-04-25 | End: 2020-04-25

## 2020-04-25 RX ORDER — LEVOFLOXACIN 750 MG/1
750 TABLET ORAL
Status: COMPLETED | OUTPATIENT
Start: 2020-04-25 | End: 2020-04-25

## 2020-04-25 RX ORDER — PREDNISONE 20 MG/1
60 TABLET ORAL DAILY
Qty: 12 TABLET | Refills: 0 | Status: SHIPPED | OUTPATIENT
Start: 2020-04-25 | End: 2020-04-29

## 2020-04-25 RX ORDER — ALBUTEROL SULFATE 90 UG/1
2 AEROSOL, METERED RESPIRATORY (INHALATION) EVERY 4 HOURS PRN
Qty: 3.5 G | Refills: 0 | Status: SHIPPED | OUTPATIENT
Start: 2020-04-25 | End: 2021-04-25

## 2020-04-25 RX ADMIN — TERBUTALINE SULFATE 0.25 MG: 1 INJECTION, SOLUTION SUBCUTANEOUS at 09:04

## 2020-04-25 RX ADMIN — LEVOFLOXACIN 750 MG: 750 TABLET, FILM COATED ORAL at 10:04

## 2020-04-25 RX ADMIN — IPRATROPIUM BROMIDE AND ALBUTEROL SULFATE 3 ML: .5; 3 SOLUTION RESPIRATORY (INHALATION) at 09:04

## 2020-04-25 RX ADMIN — PREDNISONE 60 MG: 20 TABLET ORAL at 10:04

## 2020-04-25 NOTE — DISCHARGE INSTRUCTIONS
You have a mild infection in the right lung base.  Use Levaquin as prescribed for this infection.  Take prednisone albuterol as prescribed as well.  Return as needed for any worsening symptoms, problems, questions or concerns.  See her doctor on Monday for re-evaluation.  Return sooner if her symptoms worsen in any way.

## 2020-04-25 NOTE — ED NOTES
Pt has visible difficulty breathing with supraclavicular retractions. Hx of asthma. Oxygen sat stable at 98% on RA. Placed on 2L NC for comfort. Primary RN notified.

## 2020-04-25 NOTE — ED NOTES
Spoke to Paulina in resp and she stated she could not see the order on her list and that the ICU nurse had not told her. She states she is pulling the med now and will be here shortly. Believes it is because I hit acknowledge all. MD informed

## 2020-04-25 NOTE — ED NOTES
Called resp., the regular number was busy multiple times, spoke to resp in ICU and she states she will farzaneh her and have her come down. Will continue to monitor

## 2020-04-25 NOTE — ED PROVIDER NOTES
SCRIBE #1 NOTE: I, David Bianchi, am scribing for, and in the presence of, Delvin Brink Jr., MD. I have scribed the entire note.       History     Chief Complaint   Patient presents with    Cough     SOB     Review of patient's allergies indicates:  No Known Allergies      History of Present Illness     HPI    4/25/2020, 7:58 AM  History obtained from the patient      History of Present Illness: Maureen Luther is a 60 y.o. male patient with a PMHx of asthma who presents to the Emergency Department for evaluation of a cough which onset gradually over the past few days. Pt states that he ran out of asthma medication. Symptoms are constant and moderate in severity. No mitigating or exacerbating factors reported. Associated sxs include SOB, wheezing. Patient denies any fever, chills, diaphoresis, congestion, rhinorrhea, n/v/d, HA, light-headedness, numbness, seizures, and all other sxs at this time. No further complaints or concerns at this time.       Arrival mode: Personal vehicle      PCP: Primary Doctor No        Past Medical History:  Past Medical History:   Diagnosis Date    Asthma     Facial fracture     Medical history non-contributory     Scrotal abscess        Past Surgical History:  Past Surgical History:   Procedure Laterality Date    None      ORBITAL RECONSTRUCTION      SCROTAL SURGERY           Family History:  Family History   Problem Relation Age of Onset    Hypertension Unknown        Social History:  Social History     Tobacco Use    Smoking status: Current Every Day Smoker     Packs/day: 1.00     Types: Cigarettes   Substance and Sexual Activity    Alcohol use: No    Drug use: Yes     Types: Marijuana    Sexual activity: Unknown        Review of Systems     Review of Systems   Constitutional: Negative for chills, diaphoresis and fever.   HENT: Negative for congestion, rhinorrhea and sore throat.    Respiratory: Positive for cough, shortness of breath and wheezing.     Cardiovascular: Negative for chest pain.   Gastrointestinal: Negative for abdominal pain, diarrhea, nausea and vomiting.   Genitourinary: Negative for dysuria.   Musculoskeletal: Negative for back pain.   Skin: Negative for rash.   Neurological: Negative for dizziness, seizures, syncope, weakness, light-headedness, numbness and headaches.   Hematological: Does not bruise/bleed easily.   All other systems reviewed and are negative.       Physical Exam     Initial Vitals   BP Pulse Resp Temp SpO2   04/25/20 0742 04/25/20 0742 04/25/20 0742 04/25/20 0751 04/25/20 0742   (!) 143/89 106 (!) 25 97.9 °F (36.6 °C) 97 %      MAP       --                 Physical Exam  Nursing Notes and Vital Signs Reviewed.  Constitutional: Patient is in mild distress. Well-developed and well-nourished. Afebrile. Appears uncomfortable.  Head: Atraumatic. Normocephalic.  Eyes: PERRL. EOM intact. Conjunctivae are not pale. No scleral icterus.  ENT: Mucous membranes are moist. Oropharynx is clear and symmetric.  nares clear  Neck: Supple.  Trachea midline.  No stridor  Cardiovascular: Regular rate. Regular rhythm. No murmurs, rubs, or gallops. Distal pulses are 2+ and symmetric. No pedal edema.  Pulmonary/Chest: Minimal respiratory distress. Tachypneic. No tripoding. Wheezing in all lung fields. No rales, no orthopnea.  Abdominal: Soft and non-distended.  There is no tenderness.  No rebound, guarding, or rigidity. Good bowel sounds.  Genitourinary: No CVA tenderness.  No suprapubic tenderness  Musculoskeletal: Moves all extremities. No obvious deformities. No edema. No calf tenderness.  Skin: Warm and dry.  Neurological:  Alert, awake, and appropriate.  Normal speech.  No acute focal neurological deficits are appreciated.  Psychiatric: Normal affect. Good eye contact. Appropriate in content.     ED Course   Procedures  ED Vital Signs:  Vitals:    04/25/20 0742 04/25/20 0751 04/25/20 0813 04/25/20 0919   BP: (!) 143/89  (!) 150/85    Pulse:  "106  107 104   Resp: (!) 25  20 (!) 30   Temp:  97.9 °F (36.6 °C)     TempSrc: Oral Oral     SpO2: 97%  97% 99%   Weight: 70.3 kg (155 lb)      Height: 5' 9" (1.753 m)       04/25/20 0923 04/25/20 0937   BP:     Pulse: 92 99   Resp: 16 20   Temp:     TempSrc:     SpO2:  (S) 97%   Weight:     Height:         Abnormal Lab Results:  Labs Reviewed   CBC W/ AUTO DIFFERENTIAL - Abnormal; Notable for the following components:       Result Value    RBC 4.07 (*)     Hemoglobin 12.7 (*)     Hematocrit 39.2 (*)     Mean Corpuscular Hemoglobin 31.2 (*)     Eos # 1.3 (*)     Eosinophil% 14.4 (*)     All other components within normal limits   COMPREHENSIVE METABOLIC PANEL - Abnormal; Notable for the following components:    CO2 22 (*)     Calcium 8.5 (*)     All other components within normal limits   TROPONIN I   B-TYPE NATRIURETIC PEPTIDE   SARS-COV-2 RNA AMPLIFICATION, QUAL    Narrative:     What symptom criteria does the patient meet?->Difficulty  breathing  What symptom criteria does the patient meet?->Cough        All Lab Results:  Results for orders placed or performed during the hospital encounter of 04/25/20   CBC auto differential   Result Value Ref Range    WBC 8.84 3.90 - 12.70 K/uL    RBC 4.07 (L) 4.60 - 6.20 M/uL    Hemoglobin 12.7 (L) 14.0 - 18.0 g/dL    Hematocrit 39.2 (L) 40.0 - 54.0 %    Mean Corpuscular Volume 96 82 - 98 fL    Mean Corpuscular Hemoglobin 31.2 (H) 27.0 - 31.0 pg    Mean Corpuscular Hemoglobin Conc 32.4 32.0 - 36.0 g/dL    RDW 13.0 11.5 - 14.5 %    Platelets 217 150 - 350 K/uL    MPV 9.3 9.2 - 12.9 fL    Immature Granulocytes 0.3 0.0 - 0.5 %    Gran # (ANC) 5.3 1.8 - 7.7 K/uL    Immature Grans (Abs) 0.03 0.00 - 0.04 K/uL    Lymph # 1.7 1.0 - 4.8 K/uL    Mono # 0.5 0.3 - 1.0 K/uL    Eos # 1.3 (H) 0.0 - 0.5 K/uL    Baso # 0.03 0.00 - 0.20 K/uL    nRBC 0 0 /100 WBC    Gran% 60.5 38.0 - 73.0 %    Lymph% 19.1 18.0 - 48.0 %    Mono% 5.4 4.0 - 15.0 %    Eosinophil% 14.4 (H) 0.0 - 8.0 %    Basophil% 0.3 " 0.0 - 1.9 %    Differential Method Automated    Comprehensive metabolic panel   Result Value Ref Range    Sodium 141 136 - 145 mmol/L    Potassium 4.6 3.5 - 5.1 mmol/L    Chloride 110 95 - 110 mmol/L    CO2 22 (L) 23 - 29 mmol/L    Glucose 77 70 - 110 mg/dL    BUN, Bld 18 6 - 20 mg/dL    Creatinine 1.3 0.5 - 1.4 mg/dL    Calcium 8.5 (L) 8.7 - 10.5 mg/dL    Total Protein 6.5 6.0 - 8.4 g/dL    Albumin 3.5 3.5 - 5.2 g/dL    Total Bilirubin 0.8 0.1 - 1.0 mg/dL    Alkaline Phosphatase 69 55 - 135 U/L    AST 28 10 - 40 U/L    ALT 33 10 - 44 U/L    Anion Gap 9 8 - 16 mmol/L    eGFR if African American >60 >60 mL/min/1.73 m^2    eGFR if non African American >60 >60 mL/min/1.73 m^2   Troponin I   Result Value Ref Range    Troponin I 0.011 0.000 - 0.026 ng/mL   Brain natriuretic peptide   Result Value Ref Range    BNP 29 0 - 99 pg/mL   COVID-19 Routine Screening   Result Value Ref Range    SARS-CoV-2 RNA, Amplification, Qual Negative Negative         Imaging Results:  Imaging Results          X-Ray Chest AP Portable (In process)                  The EKG was ordered, reviewed, and independently interpreted by the ED provider.  Interpretation time: 0948  Rate: 84 BPM  Rhythm: normal sinus rhythm  Interpretation: No acute ST changes. No STEMI.           The Emergency Provider reviewed the vital signs and test results, which are outlined above.     ED Discussion       9:48 AM: XR read by Dr. Brink on x-ray machine, shows Interstitial infiltrate R lung base. [facility] is experiencing difficulties att and radiology is unable to read XR.      Discussed with pt all pertinent ED information and results. Discussed pt dx and plan of tx. Gave pt all f/u and return to the ED instructions. All questions and concerns were addressed at this time. Pt expresses understanding of information and instructions, and is comfortable with plan to discharge. Pt is stable for discharge.    I discussed with patient and/or family/caretaker that evaluation in  the ED does not suggest any emergent or life threatening medical conditions requiring immediate intervention beyond what was provided in the ED, and I believe patient is safe for discharge.  Regardless, an unremarkable evaluation in the ED does not preclude the development or presence of a serious of life threatening condition. As such, patient was instructed to return immediately for any worsening or change in current symptoms.    10:01 AM  Patient is stable nontoxic.  Use COVID negative with a history of asthma.  He has been noncompliant his albuterol as he has been out.  Patient has wheezing and shortness of breath consistent with prior asthma flares workup shows mild right-sided infiltrate.  The patient is clinically improved after treatment.  Discussed with well as the plan of care.  Stable safe for discharge opinion.  Will start antibiotics.  Patient states usually improves very well with steroids.  I discussed with risks of steroids in the setting of corona virus outbreak.  After he verbalized understanding of the risks which include but not limited to respiratory failure, death, cytokine storm etc, steroids were prescribed at his request. patient will return if his symptoms worsen in any way.       Medical Decision Making:   Clinical Tests:   Lab Tests: Ordered and Reviewed  Radiological Study: Ordered and Reviewed  Medical Tests: Ordered and Reviewed           ED Medication(s):  Medications   levoFLOXacin tablet 750 mg (has no administration in time range)   predniSONE tablet 60 mg (has no administration in time range)   albuterol-ipratropium 2.5 mg-0.5 mg/3 mL nebulizer solution 3 mL (3 mLs Nebulization Given by Other 4/25/20 0923)   terbutaline injection 0.25 mg (0.25 mg Subcutaneous Given 4/25/20 0925)       Current Discharge Medication List                  Scribe Attestation:   Scribe #1: I performed the above scribed service and the documentation accurately describes the services I performed. I  attest to the accuracy of the note.     Attending:   Physician Attestation Statement for Scribe #1: I, Delvin Brink Jr., MD, personally performed the services described in this documentation, as scribed by David Bianchi, in my presence, and it is both accurate and complete.           Clinical Impression       ICD-10-CM ICD-9-CM   1. Dyspnea R06.00 786.09   2. Pneumonia of right lower lobe due to infectious organism J18.1 486   3. Mild asthma with exacerbation, unspecified whether persistent J45.901 493.92       Disposition:   Disposition: Discharged  Condition: Stable         Delvin Brink Jr., MD  04/25/20 1002       Delvin Brink Jr., MD  04/25/20 1003

## 2020-07-31 NOTE — ED PROVIDER NOTES
SCRIBE #1 NOTE: I, Babita Saleh, am scribing for, and in the presence of, Chasity Irizarry MD. I have scribed the entire note.      History      Chief Complaint   Patient presents with    Otalgia     Pt reports R sided ear pain x1day. Denies drainage.       Review of patient's allergies indicates:  No Known Allergies     HPI   HPI    3/24/2017, 4:33 AM   History obtained from the patient      History of Present Illness: Maureen Luther is a 56 y.o. male patient who presents to the Emergency Department for otalgia which onset last night. Symptoms are constant and moderate in severity. Pain is located to R ear. No mitigating or exacerbating factors reported. No associated sxs at this time. Patient denies any ear drainage, rhinorrhea, sore throat, HA, CP, SOB, and all other sxs at this time. No prior Tx. No further complaints or concerns at this time.         Arrival mode: Personal vehicle     PCP: Primary Doctor No       Past Medical History:  Past Medical History:   Diagnosis Date    Facial fracture     Medical history non-contributory     Scrotal abscess        Past Surgical History:  Past Surgical History:   Procedure Laterality Date    None      ORBITAL RECONSTRUCTION      SCROTAL SURGERY           Family History:  Family History   Problem Relation Age of Onset    Hypertension         Social History:  Social History     Social History Main Topics    Smoking status: Current Every Day Smoker     Packs/day: 1.00     Types: Cigarettes    Smokeless tobacco: Unknown    Alcohol use No    Drug use: No    Sexual activity: Unknown       ROS   Review of Systems   Constitutional: Negative for fever.   HENT: Positive for ear pain (Right). Negative for ear discharge, rhinorrhea and sore throat.    Respiratory: Negative for shortness of breath.    Cardiovascular: Negative for chest pain.   Gastrointestinal: Negative for nausea.   Genitourinary: Negative for dysuria.   Musculoskeletal: Negative for back pain.   Skin:  "Negative for rash.   Neurological: Negative for weakness and headaches.   Hematological: Does not bruise/bleed easily.   All other systems reviewed and are negative.      Physical Exam    Initial Vitals   BP Pulse Resp Temp SpO2   03/24/17 0355 03/24/17 0355 03/24/17 0355 03/24/17 0355 03/24/17 0355   174/110 73 20 96.9 °F (36.1 °C) 97 %      Physical Exam  Nursing Notes and Vital Signs Reviewed.  Constitutional: Patient is in no acute distress. Awake and alert. Well-developed and well-nourished.  Head: Atraumatic. Normocephalic.  Eyes: PERRL. EOM intact. Conjunctivae are not pale. No scleral icterus.  Ears: Right TM normal. Left TM normal. No erythema. No bulging. No effusion or air-fluid levels. No perforation. R mastoid area tenderness.  Nose: Patent nares. Turbinates are normal. No drainage.   Throat: Moist mucous membranes. Posterior oropharynx is symmetric without erythema. Tonsillar exudate is  present. No trismus. Normal handling of secretions. No stridor.  Neck: Supple. Full ROM. No lymphadenopathy.  Cardiovascular: Regular rate. Regular rhythm. No murmurs, rubs, or gallops. Distal pulses are 2+ and symmetric.  Pulmonary/Chest: No respiratory distress. Clear to auscultation bilaterally. No wheezing, rales, or rhonchi.  Abdominal: Soft and non-distended.  There is no tenderness.  No rebound, guarding, or rigidity. Good bowel sounds.  Genitourinary: No CVA tenderness  Musculoskeletal: Moves all extremities. No obvious deformities. No edema. No calf tenderness.  Skin: Warm and dry.  Neurological:  Alert, awake, and appropriate.  Normal speech.  No acute focal neurological deficits are appreciated.  Psychiatric: Normal affect. Good eye contact. Appropriate in content.    ED Course    Procedures  ED Vital Signs:  Vitals:    03/24/17 0355   BP: (!) 174/110   Pulse: 73   Resp: 20   Temp: 96.9 °F (36.1 °C)   TempSrc: Oral   SpO2: 97%   Weight: 90.7 kg (200 lb)   Height: 5' 9" (1.753 m)              The Emergency " Provider reviewed the vital signs and test results, which are outlined above.    ED Discussion     4:37 AM:  Discussed with pt all pertinent ED information and results. Discussed pt dx and plan of tx. Gave pt all f/u and return to the ED instructions. All questions and concerns were addressed at this time. Pt expresses understanding of information and instructions, and is comfortable with plan to discharge. Pt is stable for discharge.    Pre-hypertension/Hypertension: The pt has been informed that they may have pre-hypertension or hypertension based on a blood pressure reading in the ED. I recommend that the pt call the PCP listed on their discharge instructions or a physician of their choice this week to arrange f/u for further evaluation of possible pre-hypertension or hypertension.       ED Medication(s):  Medications - No data to display    Discharge Medication List as of 3/24/2017  4:36 AM      START taking these medications    Details   amoxicillin-clavulanate 875-125mg (AUGMENTIN) 875-125 mg per tablet Take 1 tablet by mouth 2 (two) times daily., Starting 3/24/2017, Until Mon 4/3/17, Print      naproxen (NAPROSYN) 375 MG tablet Take 1 tablet (375 mg total) by mouth 2 (two) times daily with meals., Starting 3/24/2017, Until Discontinued, Print      tramadol (ULTRAM) 50 mg tablet Take 1 tablet (50 mg total) by mouth every 6 (six) hours as needed for Pain., Starting 3/24/2017, Until Discontinued, Print             Follow-up Information     Follow up with [facility] In 3 days.    Contact information:    [address] [facility] [address] [phone]          Follow up with [facility] - .    Specialty:  Emergency Medicine    Why:  As needed, If symptoms worsen    Contact information:    [address] [facility] [address] [phone]            Medical Decision Making              Scribe Attestation:   Scribe #1: I performed the above scribed  service and the documentation accurately describes the services I performed. I attest to the accuracy of the note.    Attending:   Physician Attestation Statement for Scribe #1: I, Chasity Irizarry MD, personally performed the services described in this documentation, as scribed by Babita Saleh, in my presence, and it is both accurate and complete.          Clinical Impression       ICD-10-CM ICD-9-CM   1. Mastoiditis of right side H70.91 383.9       Disposition:   Disposition: Discharged  Condition: Stable         Chasity Irizarry MD  03/24/17 0558     Cardiac Monitor/Defib/ACLS/Rescue Kit/O2/BVM

## 2022-02-10 NOTE — ED AVS SNAPSHOT
[facility]  [address] [facility] [address]               Maureen Luther   3/13/2017 10:42 PM   ED    Description:  Male : 1960   Department:  [facility]           Your Care was Coordinated By:     None      Reason for Visit     Generalized Body Aches           Diagnoses this Visit        Comments    Viral syndrome    -  Primary     Body aches           ED Disposition     None           To Do List           Follow-up Information     Follow up with [facility] In 2 days.    Specialty:  Internal Medicine    Contact information:    [address] [facility] [id number] [phone]    Additional information:    ([address]) 1st floor        Follow up with [facility].    Specialty:  Emergency Medicine    Why:  If symptoms worsen    Contact information:    [address] [id number] [phone]      [facility]     [facility] -  Assistance  Registered nurses in the [facility] provide clinical advisement, health education, appointment booking, and other advisory services.  Call for this free service at [phone].             Medications           Message regarding Medications     Verify the changes and/or additions to your medication regime listed below are the same as discussed with your clinician today.  If any of these changes or additions are incorrect, please notify your healthcare provider.        These medications were administered today        Dose Freq    dexamethasone injection 8 mg 8 mg ED 1 Time    Sig: Inject 2 mLs (8 mg total) into the muscle ED 1 Time.    Class: Normal    Route: Intramuscular           Verify that the below list of medications is an accurate representation of the medications you are currently taking.  If none reported, the list may be blank. If incorrect, please contact your  "healthcare provider. Carry this list with you in case of emergency.           Current Medications     dexamethasone injection 8 mg Inject 2 mLs (8 mg total) into the muscle ED 1 Time.           Clinical Reference Information           Your Vitals Were     BP Pulse Temp Resp Height Weight    165/95 (BP Location: Right arm, Patient Position: Sitting) 100 98.2 °F (36.8 °C) (Oral) 20 5' 9" (1.753 m) 90.7 kg (200 lb)    SpO2 BMI             96% 29.53 kg/m2         Allergies as of 3/13/2017     No Known Allergies      Immunizations Administered on Date of Encounter - 3/13/2017     None      ED Micro, Lab, POCT     None      ED Imaging Orders     None        Discharge Instructions         Viral Syndrome (Adult)  A viral illness may cause a number of symptoms. The symptoms depend on the part of the body that the virus affects. If it settles in your nose, throat, and lungs, it may cause cough, sore throat, congestion, and sometimes headache. If it settles in your stomach and intestinal tract, it may cause vomiting and diarrhea. Sometimes it causes vague symptoms like "aching all over," feeling tired, loss of appetite, or fever.  A viral illness usually lasts 1 to 2 weeks, but sometimes it lasts longer. In some cases, a more serious infection can look like a viral syndrome in the first few days of the illness. You may need another exam and additional tests to know the difference. Watch for the warning signs listed below.  Home care  Follow these guidelines for taking care of yourself at home:  · If symptoms are severe, rest at home for the first 2 to 3 days.  · Stay away from cigarette smoke - both your smoke and the smoke from others.  · You may use over-the-counter acetaminophen or ibuprofen for fever, muscle aching, and headache, unless another medicine was prescribed for this. If you have chronic liver or kidney disease or ever had a stomach ulcer or GI bleeding, talk with your doctor before using these medicines. No one " who is younger than 18 and ill with a fever should take aspirin. It may cause severe disease or death.  · Your appetite may be poor, so a light diet is fine. Avoid dehydration by drinking 8 to 12 8-ounce glasses of fluids each day. This may include water; orange juice; lemonade; apple, grape, and cranberry juice; clear fruit drinks; electrolyte replacement and sports drinks; and decaffeinated teas and coffee. If you have been diagnosed with a kidney disease, ask your doctor how much and what types of fluids you should drink to prevent dehydration. If you have kidney disease, drinking too much fluid can cause it build up in the your body and be dangerous to your health.  · Over-the-counter remedies won't shorten the length of the illness but may be helpful for cough, sore throat; and nasal and sinus congestion. Don't use decongestants if you have high blood pressure.  Follow-up care  Follow up with your healthcare provider if you do not improve over the next week.  Call 911  Get emergency medical care if any of the following occur:  · Convulsion  · Feeling weak, dizzy, or like you are going to faint  · Chest pain, shortness of breath, wheezing, or difficulty breathing  When to seek medical advice  Call your healthcare provider right away if any of these occur:  · Cough with lots of colored sputum (mucus) or blood in your sputum  · Chest pain, shortness of breath, wheezing, or difficulty breathing  · Severe headache; face, neck, or ear pain  · Severe, constant pain in the lower right side of your belly (abdominal)  · Continued vomiting (cant keep liquids down)  · Frequent diarrhea (more than 5 times a day); blood (red or black color) or mucus in diarrhea  · Feeling weak, dizzy, or like you are going to faint  · Extreme thirst  · Fever of 100.4°F (38°C) or higher, or as directed by your healthcare provider  Date Last Reviewed: 9/25/2015  © 8245-3273 [employer]. [address] [place] [address], [place] [address].  All rights reserved. This information is not intended as a substitute for professional medical care. Always follow your healthcare professional's instructions.          [facility] Sign-Up     Activating your [facility] account is as easy as 1-2-3!     1) Visit [email or web address], select Sign Up Now, enter this activation code and your date of birth, then select Next.  [email or web address]  Expires: 4/27/2017 10:47 PM      2) Create a username and password to use when you visit [facility] in the future and select a security question in case you lose your password and select Next.    3) Enter your e-mail address and click Sign Up!    Additional Information  If you have questions, please e-mail [email or web address] or call [phone] to talk to our [facility] staff. Remember, [facility] is NOT to be used for urgent needs. For medical emergencies, dial 911.         Smoking Cessation     If you would like to quit smoking:   You may be eligible for free services if you are a [place] resident and started smoking cigarettes before September 1, 1988.  Call the Smoking Cessation Trust (SCT) toll free at (393) 008-8142 or (246) 417-5483.   Call 5-017-QUIT-NOW if you do not meet the above criteria.             [facility] complies with applicable Federal civil rights laws and does not discriminate on the basis of race, color, national origin, age, disability, or sex.        Language Assistance Services     ATTENTION: Language assistance services are available, free of charge. Please call [phone].      ATENCIÓN: Si habla español, tiene a florez disposición servicios gratuitos de asistencia lingüística. Llame al [phone].     CHÚ Ý: N?u b?n nói Ti?ng Vi?t, có các d?ch v? h? tr? ngôn ng? mi?n phí dành cho b?n. G?i s? [phone].         10-Feb-2022 17:49

## 2023-08-30 ENCOUNTER — HOSPITAL ENCOUNTER (INPATIENT)
Facility: HOSPITAL | Age: 63
LOS: 2 days | Discharge: HOME | DRG: 871 | End: 2023-09-01
Attending: EMERGENCY MEDICINE | Admitting: FAMILY MEDICINE
Payer: MEDICAID

## 2023-08-30 DIAGNOSIS — J18.9 PNEUMONIA OF BOTH LUNGS DUE TO INFECTIOUS ORGANISM, UNSPECIFIED PART OF LUNG: Primary | ICD-10-CM

## 2023-08-30 DIAGNOSIS — R06.02 SHORTNESS OF BREATH: ICD-10-CM

## 2023-08-30 DIAGNOSIS — R07.9 CHEST PAIN: ICD-10-CM

## 2023-08-30 DIAGNOSIS — B19.20 HEPATITIS C VIRUS INFECTION WITHOUT HEPATIC COMA, UNSPECIFIED CHRONICITY: ICD-10-CM

## 2023-08-30 PROBLEM — E80.6 HYPERBILIRUBINEMIA: Status: ACTIVE | Noted: 2023-08-30

## 2023-08-30 PROBLEM — A41.9 SEPSIS: Status: ACTIVE | Noted: 2023-08-30

## 2023-08-30 LAB
ALBUMIN SERPL BCP-MCNC: 3 G/DL (ref 3.5–5.2)
ALLENS TEST: ABNORMAL
ALP SERPL-CCNC: 87 U/L (ref 55–135)
ALT SERPL W/O P-5'-P-CCNC: 44 U/L (ref 10–44)
ANION GAP SERPL CALC-SCNC: 11 MMOL/L (ref 8–16)
AST SERPL-CCNC: 37 U/L (ref 10–40)
BASOPHILS NFR BLD: 0 % (ref 0–1.9)
BILIRUB SERPL-MCNC: 2 MG/DL (ref 0.1–1)
BNP SERPL-MCNC: 115 PG/ML (ref 0–99)
BUN SERPL-MCNC: 21 MG/DL (ref 8–23)
CALCIUM SERPL-MCNC: 8.8 MG/DL (ref 8.7–10.5)
CHLORIDE SERPL-SCNC: 107 MMOL/L (ref 95–110)
CO2 SERPL-SCNC: 19 MMOL/L (ref 23–29)
CREAT SERPL-MCNC: 1 MG/DL (ref 0.5–1.4)
DELSYS: ABNORMAL
DIFFERENTIAL METHOD: ABNORMAL
EOSINOPHIL NFR BLD: 0 % (ref 0–8)
ERYTHROCYTE [DISTWIDTH] IN BLOOD BY AUTOMATED COUNT: 12.5 % (ref 11.5–14.5)
EST. GFR  (NO RACE VARIABLE): >60 ML/MIN/1.73 M^2
GLUCOSE SERPL-MCNC: 94 MG/DL (ref 70–110)
HCO3 UR-SCNC: 20.9 MMOL/L (ref 24–28)
HCT VFR BLD AUTO: 36.4 % (ref 40–54)
HCV AB SERPL QL IA: POSITIVE
HEP C VIRUS HOLD SPECIMEN: NORMAL
HGB BLD-MCNC: 12.8 G/DL (ref 14–18)
HIV 1+2 AB+HIV1 P24 AG SERPL QL IA: NEGATIVE
IMM GRANULOCYTES # BLD AUTO: ABNORMAL K/UL (ref 0–0.04)
IMM GRANULOCYTES NFR BLD AUTO: ABNORMAL % (ref 0–0.5)
INFLUENZA A, MOLECULAR: NEGATIVE
INFLUENZA B, MOLECULAR: NEGATIVE
LACTATE SERPL-SCNC: 1.7 MMOL/L (ref 0.5–2.2)
LIPASE SERPL-CCNC: 55 U/L (ref 4–60)
LYMPHOCYTES NFR BLD: 15 % (ref 18–48)
MAGNESIUM SERPL-MCNC: 1.6 MG/DL (ref 1.6–2.6)
MCH RBC QN AUTO: 31.1 PG (ref 27–31)
MCHC RBC AUTO-ENTMCNC: 35.2 G/DL (ref 32–36)
MCV RBC AUTO: 89 FL (ref 82–98)
METAMYELOCYTES NFR BLD MANUAL: 12 %
MONOCYTES NFR BLD: 11 % (ref 4–15)
MYELOCYTES NFR BLD MANUAL: 1 %
NEUTROPHILS NFR BLD: 45 % (ref 38–73)
NEUTS BAND NFR BLD MANUAL: 16 %
NRBC BLD-RTO: 0 /100 WBC
PCO2 BLDA: 35 MMHG (ref 35–45)
PH SMN: 7.38 [PH] (ref 7.35–7.45)
PLATELET # BLD AUTO: 204 K/UL (ref 150–450)
PLATELET BLD QL SMEAR: ABNORMAL
PMV BLD AUTO: 9.3 FL (ref 9.2–12.9)
PO2 BLDA: 65 MMHG (ref 80–100)
POC BE: -4 MMOL/L
POC SATURATED O2: 92 % (ref 95–100)
POTASSIUM SERPL-SCNC: 3.8 MMOL/L (ref 3.5–5.1)
PROCALCITONIN SERPL IA-MCNC: 9.96 NG/ML
PROT SERPL-MCNC: 6.3 G/DL (ref 6–8.4)
RBC # BLD AUTO: 4.11 M/UL (ref 4.6–6.2)
SAMPLE: ABNORMAL
SARS-COV-2 RDRP RESP QL NAA+PROBE: NEGATIVE
SITE: ABNORMAL
SODIUM SERPL-SCNC: 137 MMOL/L (ref 136–145)
SPECIMEN SOURCE: NORMAL
TROPONIN I SERPL DL<=0.01 NG/ML-MCNC: 0.02 NG/ML (ref 0–0.03)
TROPONIN I SERPL DL<=0.01 NG/ML-MCNC: 0.04 NG/ML (ref 0–0.03)
WBC # BLD AUTO: 12.84 K/UL (ref 3.9–12.7)

## 2023-08-30 PROCEDURE — 80053 COMPREHEN METABOLIC PANEL: CPT | Performed by: EMERGENCY MEDICINE

## 2023-08-30 PROCEDURE — 87522 HEPATITIS C REVRS TRNSCRPJ: CPT | Mod: 91 | Performed by: EMERGENCY MEDICINE

## 2023-08-30 PROCEDURE — 85060 BLOOD SMEAR INTERPRETATION: CPT | Mod: ,,, | Performed by: PATHOLOGY

## 2023-08-30 PROCEDURE — 83735 ASSAY OF MAGNESIUM: CPT | Performed by: EMERGENCY MEDICINE

## 2023-08-30 PROCEDURE — 11000001 HC ACUTE MED/SURG PRIVATE ROOM

## 2023-08-30 PROCEDURE — 93010 EKG 12-LEAD: ICD-10-PCS | Mod: ,,, | Performed by: STUDENT IN AN ORGANIZED HEALTH CARE EDUCATION/TRAINING PROGRAM

## 2023-08-30 PROCEDURE — 83690 ASSAY OF LIPASE: CPT | Performed by: EMERGENCY MEDICINE

## 2023-08-30 PROCEDURE — 93005 ELECTROCARDIOGRAM TRACING: CPT

## 2023-08-30 PROCEDURE — 84145 PROCALCITONIN (PCT): CPT | Performed by: EMERGENCY MEDICINE

## 2023-08-30 PROCEDURE — 99285 EMERGENCY DEPT VISIT HI MDM: CPT | Mod: 25

## 2023-08-30 PROCEDURE — 82803 BLOOD GASES ANY COMBINATION: CPT

## 2023-08-30 PROCEDURE — 87502 INFLUENZA DNA AMP PROBE: CPT | Performed by: EMERGENCY MEDICINE

## 2023-08-30 PROCEDURE — 85007 BL SMEAR W/DIFF WBC COUNT: CPT | Performed by: EMERGENCY MEDICINE

## 2023-08-30 PROCEDURE — 25000242 PHARM REV CODE 250 ALT 637 W/ HCPCS: Performed by: EMERGENCY MEDICINE

## 2023-08-30 PROCEDURE — 94640 AIRWAY INHALATION TREATMENT: CPT

## 2023-08-30 PROCEDURE — 25000003 PHARM REV CODE 250: Performed by: EMERGENCY MEDICINE

## 2023-08-30 PROCEDURE — 99900035 HC TECH TIME PER 15 MIN (STAT)

## 2023-08-30 PROCEDURE — 25500020 PHARM REV CODE 255: Performed by: EMERGENCY MEDICINE

## 2023-08-30 PROCEDURE — 85060 PATHOLOGIST REVIEW: ICD-10-PCS | Mod: ,,, | Performed by: PATHOLOGY

## 2023-08-30 PROCEDURE — 85027 COMPLETE CBC AUTOMATED: CPT | Performed by: EMERGENCY MEDICINE

## 2023-08-30 PROCEDURE — 63600175 PHARM REV CODE 636 W HCPCS: Performed by: EMERGENCY MEDICINE

## 2023-08-30 PROCEDURE — 96375 TX/PRO/DX INJ NEW DRUG ADDON: CPT

## 2023-08-30 PROCEDURE — 87522 HEPATITIS C REVRS TRNSCRPJ: CPT | Performed by: EMERGENCY MEDICINE

## 2023-08-30 PROCEDURE — 86803 HEPATITIS C AB TEST: CPT | Performed by: EMERGENCY MEDICINE

## 2023-08-30 PROCEDURE — 83880 ASSAY OF NATRIURETIC PEPTIDE: CPT | Performed by: EMERGENCY MEDICINE

## 2023-08-30 PROCEDURE — 96361 HYDRATE IV INFUSION ADD-ON: CPT

## 2023-08-30 PROCEDURE — 96365 THER/PROPH/DIAG IV INF INIT: CPT

## 2023-08-30 PROCEDURE — 25000003 PHARM REV CODE 250: Performed by: FAMILY MEDICINE

## 2023-08-30 PROCEDURE — 93010 ELECTROCARDIOGRAM REPORT: CPT | Mod: ,,, | Performed by: STUDENT IN AN ORGANIZED HEALTH CARE EDUCATION/TRAINING PROGRAM

## 2023-08-30 PROCEDURE — 87389 HIV-1 AG W/HIV-1&-2 AB AG IA: CPT | Performed by: EMERGENCY MEDICINE

## 2023-08-30 PROCEDURE — U0002 COVID-19 LAB TEST NON-CDC: HCPCS | Performed by: EMERGENCY MEDICINE

## 2023-08-30 PROCEDURE — 84484 ASSAY OF TROPONIN QUANT: CPT | Mod: 91 | Performed by: EMERGENCY MEDICINE

## 2023-08-30 PROCEDURE — 83605 ASSAY OF LACTIC ACID: CPT | Performed by: EMERGENCY MEDICINE

## 2023-08-30 PROCEDURE — 36600 WITHDRAWAL OF ARTERIAL BLOOD: CPT

## 2023-08-30 PROCEDURE — 87040 BLOOD CULTURE FOR BACTERIA: CPT | Performed by: EMERGENCY MEDICINE

## 2023-08-30 PROCEDURE — 87502 INFLUENZA DNA AMP PROBE: CPT

## 2023-08-30 PROCEDURE — 94761 N-INVAS EAR/PLS OXIMETRY MLT: CPT

## 2023-08-30 PROCEDURE — 63600175 PHARM REV CODE 636 W HCPCS: Performed by: FAMILY MEDICINE

## 2023-08-30 RX ORDER — POLYETHYLENE GLYCOL 3350 17 G/17G
17 POWDER, FOR SOLUTION ORAL DAILY
Status: DISCONTINUED | OUTPATIENT
Start: 2023-08-31 | End: 2023-09-01 | Stop reason: HOSPADM

## 2023-08-30 RX ORDER — ARFORMOTEROL TARTRATE 15 UG/2ML
15 SOLUTION RESPIRATORY (INHALATION) 2 TIMES DAILY
Status: DISCONTINUED | OUTPATIENT
Start: 2023-08-31 | End: 2023-09-01 | Stop reason: HOSPADM

## 2023-08-30 RX ORDER — ASPIRIN 81 MG/1
1 TABLET ORAL DAILY
COMMUNITY

## 2023-08-30 RX ORDER — ASPIRIN 81 MG/1
81 TABLET ORAL DAILY
Status: DISCONTINUED | OUTPATIENT
Start: 2023-08-31 | End: 2023-09-01 | Stop reason: HOSPADM

## 2023-08-30 RX ORDER — TALC
6 POWDER (GRAM) TOPICAL NIGHTLY PRN
Status: DISCONTINUED | OUTPATIENT
Start: 2023-08-30 | End: 2023-09-01 | Stop reason: HOSPADM

## 2023-08-30 RX ORDER — HYDROCODONE BITARTRATE AND ACETAMINOPHEN 10; 325 MG/1; MG/1
1 TABLET ORAL 4 TIMES DAILY PRN
COMMUNITY
Start: 2023-05-24 | End: 2023-10-10 | Stop reason: ALTCHOICE

## 2023-08-30 RX ORDER — PREDNISONE 20 MG/1
20 TABLET ORAL 2 TIMES DAILY
Status: ON HOLD | COMMUNITY
Start: 2023-08-28 | End: 2023-09-01 | Stop reason: HOSPADM

## 2023-08-30 RX ORDER — BUDESONIDE 0.5 MG/2ML
0.5 INHALANT ORAL EVERY 12 HOURS
Status: DISCONTINUED | OUTPATIENT
Start: 2023-08-31 | End: 2023-09-01 | Stop reason: HOSPADM

## 2023-08-30 RX ORDER — SODIUM CHLORIDE 0.9 % (FLUSH) 0.9 %
10 SYRINGE (ML) INJECTION EVERY 12 HOURS PRN
Status: DISCONTINUED | OUTPATIENT
Start: 2023-08-30 | End: 2023-09-01 | Stop reason: HOSPADM

## 2023-08-30 RX ORDER — ACETAMINOPHEN 500 MG
1000 TABLET ORAL EVERY 8 HOURS PRN
Status: DISCONTINUED | OUTPATIENT
Start: 2023-08-30 | End: 2023-09-01 | Stop reason: HOSPADM

## 2023-08-30 RX ORDER — BUDESONIDE AND FORMOTEROL FUMARATE DIHYDRATE 80; 4.5 UG/1; UG/1
2 AEROSOL RESPIRATORY (INHALATION) DAILY
COMMUNITY
Start: 2023-07-30 | End: 2023-09-15 | Stop reason: SDUPTHER

## 2023-08-30 RX ORDER — NITROGLYCERIN 0.4 MG/1
1 TABLET SUBLINGUAL
COMMUNITY

## 2023-08-30 RX ORDER — FLUTICASONE FUROATE AND VILANTEROL 100; 25 UG/1; UG/1
1 POWDER RESPIRATORY (INHALATION) DAILY
Status: DISCONTINUED | OUTPATIENT
Start: 2023-08-31 | End: 2023-08-30 | Stop reason: CLARIF

## 2023-08-30 RX ORDER — ONDANSETRON 2 MG/ML
4 INJECTION INTRAMUSCULAR; INTRAVENOUS
Status: COMPLETED | OUTPATIENT
Start: 2023-08-30 | End: 2023-08-30

## 2023-08-30 RX ORDER — IPRATROPIUM BROMIDE AND ALBUTEROL SULFATE 2.5; .5 MG/3ML; MG/3ML
3 SOLUTION RESPIRATORY (INHALATION) EVERY 4 HOURS PRN
Status: DISCONTINUED | OUTPATIENT
Start: 2023-08-30 | End: 2023-09-01 | Stop reason: HOSPADM

## 2023-08-30 RX ORDER — IPRATROPIUM BROMIDE AND ALBUTEROL SULFATE 2.5; .5 MG/3ML; MG/3ML
3 SOLUTION RESPIRATORY (INHALATION)
Status: COMPLETED | OUTPATIENT
Start: 2023-08-30 | End: 2023-08-30

## 2023-08-30 RX ORDER — MORPHINE SULFATE 4 MG/ML
4 INJECTION, SOLUTION INTRAMUSCULAR; INTRAVENOUS
Status: COMPLETED | OUTPATIENT
Start: 2023-08-30 | End: 2023-08-30

## 2023-08-30 RX ORDER — METHYLPREDNISOLONE SOD SUCC 125 MG
125 VIAL (EA) INJECTION
Status: COMPLETED | OUTPATIENT
Start: 2023-08-30 | End: 2023-08-30

## 2023-08-30 RX ORDER — HYDROCODONE BITARTRATE AND ACETAMINOPHEN 5; 325 MG/1; MG/1
1 TABLET ORAL EVERY 6 HOURS PRN
Status: DISCONTINUED | OUTPATIENT
Start: 2023-08-30 | End: 2023-09-01 | Stop reason: HOSPADM

## 2023-08-30 RX ORDER — HYDRALAZINE HYDROCHLORIDE 20 MG/ML
10 INJECTION INTRAMUSCULAR; INTRAVENOUS EVERY 6 HOURS PRN
Status: DISCONTINUED | OUTPATIENT
Start: 2023-08-30 | End: 2023-09-01 | Stop reason: HOSPADM

## 2023-08-30 RX ORDER — IBUPROFEN 600 MG/1
600 TABLET ORAL
Status: COMPLETED | OUTPATIENT
Start: 2023-08-30 | End: 2023-08-30

## 2023-08-30 RX ORDER — AMLODIPINE BESYLATE 10 MG/1
10 TABLET ORAL DAILY
Status: DISCONTINUED | OUTPATIENT
Start: 2023-08-31 | End: 2023-09-01 | Stop reason: HOSPADM

## 2023-08-30 RX ADMIN — ONDANSETRON 4 MG: 2 INJECTION INTRAMUSCULAR; INTRAVENOUS at 04:08

## 2023-08-30 RX ADMIN — IOHEXOL 100 ML: 350 INJECTION, SOLUTION INTRAVENOUS at 04:08

## 2023-08-30 RX ADMIN — IBUPROFEN 600 MG: 600 TABLET, FILM COATED ORAL at 02:08

## 2023-08-30 RX ADMIN — AZITHROMYCIN MONOHYDRATE 500 MG: 500 INJECTION, POWDER, LYOPHILIZED, FOR SOLUTION INTRAVENOUS at 08:08

## 2023-08-30 RX ADMIN — CEFTRIAXONE 1 G: 1 INJECTION, POWDER, FOR SOLUTION INTRAMUSCULAR; INTRAVENOUS at 04:08

## 2023-08-30 RX ADMIN — METHYLPREDNISOLONE SODIUM SUCCINATE 125 MG: 125 INJECTION, POWDER, FOR SOLUTION INTRAMUSCULAR; INTRAVENOUS at 03:08

## 2023-08-30 RX ADMIN — SODIUM CHLORIDE 1000 ML: 9 INJECTION, SOLUTION INTRAVENOUS at 02:08

## 2023-08-30 RX ADMIN — MORPHINE SULFATE 4 MG: 4 INJECTION INTRAVENOUS at 04:08

## 2023-08-30 RX ADMIN — IPRATROPIUM BROMIDE AND ALBUTEROL SULFATE 3 ML: .5; 3 SOLUTION RESPIRATORY (INHALATION) at 03:08

## 2023-08-30 RX ADMIN — HYDROCODONE BITARTRATE AND ACETAMINOPHEN 1 TABLET: 5; 325 TABLET ORAL at 10:08

## 2023-08-30 NOTE — H&P
[facility]  [facility]  History & Physical    Patient Name: Maureen Luther  MRN: [id number]  Patient Class: IP- Inpatient  Admission Date: 8/30/2023  Attending Physician: Luciano Segovia MD   Primary Care Provider: [facility]         Patient information was obtained from patient, past medical records and ER records.     Subjective:     Principal Problem:Sepsis    Chief Complaint:   Chief Complaint   Patient presents with    Shortness of Breath     [facility] and dx with Asthma, today has increased SOB, fever and yellow sputum.        HPI: Mr Luther is a 63 year old male with a history of HTN, Hep C (never treated), cocaine abuse (no longer using), depression who presents to the ED with complaints of dyspnea, ab pain, and cough over the last 3-5 days.  He reports he was seen at [facility], given steroids and nebulizer and sent home but has not improved.  He denies any fever/chills, N/V/D.  In the Ed, patient tachycardiac, tachypenic.  Lab work notable for WBC 12.8 , bili 2.0, procal 9.  CT ab pelvis with acute processes.  CTA chest negative for PE, but showed severe consolidative pneumonia LLL and RLL.  Patient started on IV abx.  Patient will be admitted to hospital medicine for sepsis d/t bilateral pneumonia     Code Status Full   Surrogate Decision Maker Edith, significant other       Past Medical History:   Diagnosis Date    Asthma     Facial fracture     Medical history non-contributory     Scrotal abscess        Past Surgical History:   Procedure Laterality Date    None      ORBITAL RECONSTRUCTION      SCROTAL SURGERY         Review of patient's allergies indicates:  No Known Allergies    No current facility-administered medications on file prior to encounter.     Current Outpatient Medications on File Prior to Encounter   Medication Sig    amLODIPine (NORVASC) 10 MG tablet Take 1 tablet (10 mg total) by mouth once daily.     HYDROcodone-acetaminophen (NORCO)  mg per tablet Take 1 tablet by mouth 4 (four) times daily as needed.    predniSONE (DELTASONE) 20 MG tablet Take 20 mg by mouth 2 (two) times daily.    SYMBICORT 80-4.5 mcg/actuation HFAA Inhale 2 puffs into the lungs once daily.    albuterol (PROVENTIL/VENTOLIN HFA) 90 mcg/actuation inhaler 2 puffs every 4 (four) hours as needed.    aspirin (ECOTRIN) 81 MG EC tablet Take 1 tablet by mouth once daily.    nitroGLYCERIN (NITROSTAT) 0.4 MG SL tablet Take 1 tablet by mouth.    [DISCONTINUED] atenoloL (TENORMIN) 50 MG tablet     [DISCONTINUED] buPROPion (WELLBUTRIN) 100 MG tablet     [DISCONTINUED] hydroCHLOROthiazide (HYDRODIURIL) 12.5 MG Tab     [DISCONTINUED] hydrOXYzine HCl (ATARAX) 25 MG tablet Take 1 tablet (25 mg total) by mouth every 6 (six) hours.    [DISCONTINUED] naproxen (NAPROSYN) 500 MG tablet     [DISCONTINUED] terazosin (HYTRIN) 2 MG capsule      Family History       Problem Relation (Age of Onset)    Hypertension           Tobacco Use    Smoking status: Every Day     Current packs/day: 1.00     Types: Cigarettes    Smokeless tobacco: Not on file   Substance and Sexual Activity    Alcohol use: No    Drug use: Yes     Types: Marijuana    Sexual activity: Not on file     Review of Systems   Constitutional:  Positive for fatigue.   Respiratory:  Positive for cough and shortness of breath.    Gastrointestinal:  Positive for abdominal pain.     Objective:     Vital Signs (Most Recent):  Temp: 98.9 °F (37.2 °C) (08/30/23 1708)  Pulse: 93 (08/30/23 1708)  Resp: 20 (08/30/23 1708)  BP: 130/77 (08/30/23 1708)  SpO2: 96 % (08/30/23 1545) Vital Signs (24h Range):  Temp:  [98.8 °F (37.1 °C)-100.1 °F (37.8 °C)] 98.9 °F (37.2 °C)  Pulse:  [] 93  Resp:  [16-28] 20  SpO2:  [95 %-96 %] 96 %  BP: (118-130)/(67-81) 130/77     Weight: 77.1 kg (170 lb)  Body mass index is 25.1 kg/m².     Physical Exam  Vitals and nursing note reviewed.   Constitutional:        Comments: Ill appearing    Eyes:      General: Scleral icterus present.   Cardiovascular:      Rate and Rhythm: Regular rhythm. Tachycardia present.      Pulses: Normal pulses.      Heart sounds: Normal heart sounds. No murmur heard.  Pulmonary:      Effort: Pulmonary effort is normal.      Breath sounds: Normal breath sounds. No wheezing.   Abdominal:      General: Bowel sounds are normal. There is no distension.      Palpations: Abdomen is soft.      Tenderness: There is no abdominal tenderness.   Musculoskeletal:         General: No swelling. Normal range of motion.   Skin:     General: Skin is warm and dry.   Neurological:      Mental Status: He is alert and oriented to person, place, and time.              Significant Labs: All pertinent labs within the past 24 hours have been reviewed.    Significant Imaging: I have reviewed all pertinent imaging results/findings within the past 24 hours.    Assessment/Plan:     * Sepsis  This patient does have evidence of infective focus  My overall impression is sepsis.  Source: Respiratory  Antibiotics given-   Antibiotics (72h ago, onward)    Start     Stop Route Frequency Ordered    08/31/23 1700  cefTRIAXone (ROCEPHIN) 2 g in dextrose 5 % in water (D5W) 100 mL IVPB (MB+)  (Community Acquired Pneumonia (CAP) - Low MDR Risk)         09/06/23 1659 IV Every 24 hours (non-standard times) 08/30/23 1806    08/30/23 1915  azithromycin (ZITHROMAX) 500 mg in dextrose 5 % (D5W) 250 mL IVPB (Vial-Mate)  (Community Acquired Pneumonia (CAP) - Low MDR Risk)         09/04/23 1914 IV Every 24 hours (non-standard times) 08/30/23 1806        Latest lactate reviewed-  Recent Labs   Lab 08/30/23  1440   LACTATE 1.7     Organ dysfunction indicated by Acute respiratory failure    Fluid challenge Not needed - patient is not hypotensive      Post- resuscitation assessment No - Post resuscitation assessment not needed       Will Not start Pressors- Levophed for MAP of 65  Source control achieved  by: antibiotics    Hyperbilirubinemia  - Likely 2/2 hep C  - Follow up with Hepatology as an outpatient.  - Known hx of Hep C - untreated  - CT abdomen: No acute intra-abdominal abnormality identified.  No bowel obstruction, intra-abdominal abscess, free fluid, or free air.       Pneumonia of both lungs due to infectious organism  - CTA chest: Severe consolidative pneumonia in the left lower lobe and to a lesser degree in the right lower lobe and right perihilar upper lobe. Secretions filling the left lower lobe airway and in the right lower lobe peripheral airways. No pulmonary embolism.  - Continue Rocephin, add azithromycin  - ABG pending  - Duoneb PRN      Hepatitis C  - Follow up outpatient with [facility].  - CM consulted for [facility] referral.      Uncontrolled hypertension  - continue home meds  - Hydralazine PRN        VTE Risk Mitigation (From admission, onward)         Ordered     IP VTE LOW RISK PATIENT  Once         08/30/23 1806     Place sequential compression device  Until discontinued         08/30/23 1806                           Luciano Segovia MD  Department of Hospital Medicine  [facility] - Emergency Dept.

## 2023-08-30 NOTE — ASSESSMENT & PLAN NOTE
This patient does have evidence of infective focus  My overall impression is sepsis.  Source: Respiratory  Antibiotics given-   Antibiotics (72h ago, onward)    Start     Stop Route Frequency Ordered    08/31/23 1700  cefTRIAXone (ROCEPHIN) 2 g in dextrose 5 % in water (D5W) 100 mL IVPB (MB+)  (Community Acquired Pneumonia (CAP) - Low MDR Risk)         09/06/23 1659 IV Every 24 hours (non-standard times) 08/30/23 1806    08/30/23 1915  azithromycin (ZITHROMAX) 500 mg in dextrose 5 % (D5W) 250 mL IVPB (Vial-Mate)  (Community Acquired Pneumonia (CAP) - Low MDR Risk)         09/04/23 1914 IV Every 24 hours (non-standard times) 08/30/23 1806        Latest lactate reviewed-  Recent Labs   Lab 08/30/23  1440   LACTATE 1.7     Organ dysfunction indicated by Acute respiratory failure    Fluid challenge Not needed - patient is not hypotensive      Post- resuscitation assessment No - Post resuscitation assessment not needed       Will Not start Pressors- Levophed for MAP of 65  Source control achieved by: antibiotics

## 2023-08-30 NOTE — ASSESSMENT & PLAN NOTE
- Likely 2/2 hep C  - Follow up with Hepatology as an outpatient.  - Known hx of Hep C - untreated  - CT abdomen: No acute intra-abdominal abnormality identified.  No bowel obstruction, intra-abdominal abscess, free fluid, or free air.

## 2023-08-30 NOTE — SUBJECTIVE & OBJECTIVE
Past Medical History:   Diagnosis Date    Asthma     Facial fracture     Medical history non-contributory     Scrotal abscess        Past Surgical History:   Procedure Laterality Date    None      ORBITAL RECONSTRUCTION      SCROTAL SURGERY         Review of patient's allergies indicates:  No Known Allergies    No current facility-administered medications on file prior to encounter.     Current Outpatient Medications on File Prior to Encounter   Medication Sig    amLODIPine (NORVASC) 10 MG tablet Take 1 tablet (10 mg total) by mouth once daily.    HYDROcodone-acetaminophen (NORCO)  mg per tablet Take 1 tablet by mouth 4 (four) times daily as needed.    predniSONE (DELTASONE) 20 MG tablet Take 20 mg by mouth 2 (two) times daily.    SYMBICORT 80-4.5 mcg/actuation HFAA Inhale 2 puffs into the lungs once daily.    albuterol (PROVENTIL/VENTOLIN HFA) 90 mcg/actuation inhaler 2 puffs every 4 (four) hours as needed.    aspirin (ECOTRIN) 81 MG EC tablet Take 1 tablet by mouth once daily.    nitroGLYCERIN (NITROSTAT) 0.4 MG SL tablet Take 1 tablet by mouth.    [DISCONTINUED] atenoloL (TENORMIN) 50 MG tablet     [DISCONTINUED] buPROPion (WELLBUTRIN) 100 MG tablet     [DISCONTINUED] hydroCHLOROthiazide (HYDRODIURIL) 12.5 MG Tab     [DISCONTINUED] hydrOXYzine HCl (ATARAX) 25 MG tablet Take 1 tablet (25 mg total) by mouth every 6 (six) hours.    [DISCONTINUED] naproxen (NAPROSYN) 500 MG tablet     [DISCONTINUED] terazosin (HYTRIN) 2 MG capsule      Family History       Problem Relation (Age of Onset)    Hypertension           Tobacco Use    Smoking status: Every Day     Current packs/day: 1.00     Types: Cigarettes    Smokeless tobacco: Not on file   Substance and Sexual Activity    Alcohol use: No    Drug use: Yes     Types: Marijuana    Sexual activity: Not on file     Review of Systems   Constitutional:  Positive for fatigue.   Respiratory:  Positive for cough and shortness of breath.    Gastrointestinal:  Positive for  abdominal pain.     Objective:     Vital Signs (Most Recent):  Temp: 98.9 °F (37.2 °C) (08/30/23 1708)  Pulse: 93 (08/30/23 1708)  Resp: 20 (08/30/23 1708)  BP: 130/77 (08/30/23 1708)  SpO2: 96 % (08/30/23 1545) Vital Signs (24h Range):  Temp:  [98.8 °F (37.1 °C)-100.1 °F (37.8 °C)] 98.9 °F (37.2 °C)  Pulse:  [] 93  Resp:  [16-28] 20  SpO2:  [95 %-96 %] 96 %  BP: (118-130)/(67-81) 130/77     Weight: 77.1 kg (170 lb)  Body mass index is 25.1 kg/m².     Physical Exam  Vitals and nursing note reviewed.   Constitutional:       Comments: Ill appearing    Eyes:      General: Scleral icterus present.   Cardiovascular:      Rate and Rhythm: Regular rhythm. Tachycardia present.      Pulses: Normal pulses.      Heart sounds: Normal heart sounds. No murmur heard.  Pulmonary:      Effort: Pulmonary effort is normal.      Breath sounds: Normal breath sounds. No wheezing.   Abdominal:      General: Bowel sounds are normal. There is no distension.      Palpations: Abdomen is soft.      Tenderness: There is no abdominal tenderness.   Musculoskeletal:         General: No swelling. Normal range of motion.   Skin:     General: Skin is warm and dry.   Neurological:      Mental Status: He is alert and oriented to person, place, and time.              Significant Labs: All pertinent labs within the past 24 hours have been reviewed.    Significant Imaging: I have reviewed all pertinent imaging results/findings within the past 24 hours.

## 2023-08-30 NOTE — PHARMACY MED REC
"  Admission Medication History     The home medication history was taken by Janene Jain.    You may go to "Admission" then "Reconcile Home Medications" tabs to review and/or act upon these items.     The home medication list has been updated by the Pharmacy department.   Please read ALL comments highlighted in yellow.   Please address this information as you see fit.    Feel free to contact us if you have any questions or require assistance.      The medications listed below were removed from the home medication list. Please reorder if appropriate:  Patient reports no longer taking the following medication(s):  PROVENTIL/VENTOLIN HFA 90 MCG  TENORMIN 50MG  HYDRODIURIL 12.5MG  HYDROXYZINE 25MG  NAPROSYN 500MG  HYTRIN 2MG  WELLBUTRIN 100MG      Medications listed below were obtained from: Patient/family and Analytic software- [employer]  (Not in a hospital admission)        Janene Jain  [id number]    Current Outpatient Medications on File Prior to Encounter   Medication Sig Dispense Refill Last Dose    amLODIPine (NORVASC) 10 MG tablet Take 1 tablet (10 mg total) by mouth once daily. 30 tablet 11 8/29/2023    HYDROcodone-acetaminophen (NORCO)  mg per tablet Take 1 tablet by mouth 4 (four) times daily as needed.   Past Week    predniSONE (DELTASONE) 20 MG tablet Take 20 mg by mouth 2 (two) times daily.   8/30/2023    SYMBICORT 80-4.5 mcg/actuation HFAA Inhale 2 puffs into the lungs once daily.   Past Week    albuterol (PROVENTIL/VENTOLIN HFA) 90 mcg/actuation inhaler 2 puffs every 4 (four) hours as needed.       aspirin (ECOTRIN) 81 MG EC tablet Take 1 tablet by mouth once daily.       nitroGLYCERIN (NITROSTAT) 0.4 MG SL tablet Take 1 tablet by mouth.                            .          "

## 2023-08-30 NOTE — ASSESSMENT & PLAN NOTE
- CTA chest: Severe consolidative pneumonia in the left lower lobe and to a lesser degree in the right lower lobe and right perihilar upper lobe. Secretions filling the left lower lobe airway and in the right lower lobe peripheral airways. No pulmonary embolism.  - Continue Rocephin, add azithromycin  - ABG pending  - Duoneb PRN

## 2023-08-30 NOTE — HPI
Mr Luther is a 63 year old male with a history of HTN, Hep C (never treated), cocaine abuse (no longer using), depression who presents to the ED with complaints of dyspnea, ab pain, and cough over the last 3-5 days.  He reports he was seen at [facility], given steroids and nebulizer and sent home but has not improved.  He denies any fever/chills, N/V/D.  In the Ed, patient tachycardiac, tachypenic.  Lab work notable for WBC 12.8 , bili 2.0, procal 9.  CT ab pelvis with acute processes.  CTA chest negative for PE, but showed severe consolidative pneumonia LLL and RLL.  Patient started on IV abx.  Patient will be admitted to hospital medicine for sepsis d/t bilateral pneumonia     Code Status Full   Surrogate Decision Maker Edith, significant other

## 2023-08-30 NOTE — ED PROVIDER NOTES
SCRIBE #1 NOTE: I, Mary Dowd, am scribing for, and in the presence of, Mirta Mustafa MD. I have scribed the HPI, ROS, and PEx.     SCRIBE #2 NOTE: I, Elba Thurston, am scribing for, and in the presence of,  Delvin Brink Jr., MD. I have scribed the remaining portions of the note not scribed by Scribe #1.     History      Chief Complaint   Patient presents with    Shortness of Breath     Patient was seen at [facility] on Sunday for SOB and dx with Asthma, today has increased SOB, fever and yellow sputum.       Review of patient's allergies indicates:  No Known Allergies     HPI   HPI    8/30/2023, 2:02 PM   History obtained from the patient      History of Present Illness: Maureen Luther is a 63 y.o. male patient with a PMHx of asthma who presents to the Emergency Department for SOB which onset gradually 4 days ago. Pt reports that he was seen at [facility] 3 days ago for this complaint and was dx with asthma. Symptoms are constant and moderate in severity. No mitigating or exacerbating factors reported. Associated sxs include a generalized myalgias, congestion, and a productive cough with yellow sputum. Patient denies any fever, CP, N/V/D, diaphoresis, weakness, wheezing, and all other sxs at this time. No further complaints or concerns at this time.         Arrival mode: EMS    PCP: Belia, Primary Doctor       Past Medical History:  Past Medical History:   Diagnosis Date    Asthma     Facial fracture     Medical history non-contributory     Scrotal abscess        Past Surgical History:  Past Surgical History:   Procedure Laterality Date    None      ORBITAL RECONSTRUCTION      SCROTAL SURGERY           Family History:  Family History   Problem Relation Age of Onset    Hypertension Unknown        Social History:  Social History     Tobacco Use    Smoking status: Every Day     Current packs/day: 1.00     Types: Cigarettes    Smokeless tobacco: Not on file   Substance and Sexual Activity    Alcohol  use: No    Drug use: Yes     Types: Marijuana    Sexual activity: Not on file       ROS   Review of Systems   Constitutional:  Negative for diaphoresis and fever.   HENT:  Positive for congestion. Negative for sore throat.    Respiratory:  Positive for cough (productive with yellow sputum) and shortness of breath. Negative for wheezing.    Cardiovascular:  Negative for chest pain.   Gastrointestinal:  Negative for diarrhea, nausea and vomiting.   Genitourinary:  Negative for dysuria.   Musculoskeletal:  Positive for myalgias (generalized). Negative for back pain.   Skin:  Negative for rash.   Neurological:  Negative for weakness.   Hematological:  Does not bruise/bleed easily.   All other systems reviewed and are negative.      Physical Exam      Initial Vitals [08/30/23 1333]   BP Pulse Resp Temp SpO2   118/77 (!) 114 (!) 28 100.1 °F (37.8 °C) 96 %      MAP       --          Physical Exam  Nursing Notes and Vital Signs Reviewed.  Constitutional: Patient is in mild distress. Well-developed and well-nourished.  Head: Atraumatic. Normocephalic.  Eyes: PERRL. EOM intact. Conjunctivae are not pale. No scleral icterus.  ENT: Mucous membranes are moist. Oropharynx is clear and symmetric.    Neck: Supple. Full ROM. No lymphadenopathy.  Cardiovascular: Tachycardic rate. Regular rhythm. No murmurs, rubs, or gallops. Distal pulses are 2+ and symmetric.  Pulmonary/Chest: Tachypneic. Diminished breath sounds bilaterally.  Abdominal: Soft and non-distended.  There is no tenderness.  No rebound, guarding, or rigidity.   Musculoskeletal: Moves all extremities. No obvious deformities. No edema.  Skin: Warm and dry.  Neurological:  Alert, awake, and appropriate.  Normal speech.  No acute focal neurological deficits are appreciated.  Psychiatric: Normal affect. Good eye contact. Appropriate in content.    ED Course    Procedures  ED Vital Signs:  Vitals:    08/30/23 1333 08/30/23 1407 08/30/23 1408 08/30/23 1409   BP: 118/77 125/67    "  Pulse: (!) 114 (!) 115  104   Resp: (!) 28 (!) 25     Temp: 100.1 °F (37.8 °C) 100.1 °F (37.8 °C)     TempSrc: Oral Oral     SpO2: 96% 95%     Weight:  77.1 kg (170 lb)     Height:   5' 9" (1.753 m)     08/30/23 1532 08/30/23 1545 08/30/23 1546 08/30/23 1600   BP:  119/81 119/81    Pulse: 99 97 95    Resp: (!) 22 (!) 23 (!) 25 16   Temp:  98.8 °F (37.1 °C) 98.8 °F (37.1 °C)    TempSrc:  Oral Oral    SpO2: 96% 96%     Weight:       Height:        08/30/23 1708 08/30/23 1816 08/30/23 1830   BP: 130/77  134/82   Pulse: 93 96 93   Resp: 20 (!) 29 20   Temp: 98.9 °F (37.2 °C)     TempSrc: Oral     SpO2:  95% 96%   Weight:      Height:          Abnormal Lab Results:  Labs Reviewed   HEPATITIS C ANTIBODY - Abnormal; Notable for the following components:       Result Value    Hepatitis C Ab Positive (*)     All other components within normal limits    Narrative:     Release to patient->Immediate   CBC W/ AUTO DIFFERENTIAL - Abnormal; Notable for the following components:    WBC 12.84 (*)     RBC 4.11 (*)     Hemoglobin 12.8 (*)     Hematocrit 36.4 (*)     MCH 31.1 (*)     Lymph % 15.0 (*)     All other components within normal limits   COMPREHENSIVE METABOLIC PANEL - Abnormal; Notable for the following components:    CO2 19 (*)     Albumin 3.0 (*)     Total Bilirubin 2.0 (*)     All other components within normal limits   PROCALCITONIN - Abnormal; Notable for the following components:    Procalcitonin 9.96 (*)     All other components within normal limits   TROPONIN I - Abnormal; Notable for the following components:    Troponin I 0.039 (*)     All other components within normal limits   B-TYPE NATRIURETIC PEPTIDE - Abnormal; Notable for the following components:     (*)     All other components within normal limits   ISTAT PROCEDURE - Abnormal; Notable for the following components:    POC PO2 65 (*)     POC HCO3 20.9 (*)     POC SATURATED O2 92 (*)     All other components within normal limits   INFLUENZA A & B BY " MOLECULAR   CULTURE, BLOOD   CULTURE, BLOOD   HIV 1 / 2 ANTIBODY    Narrative:     Release to patient->Immediate   HEP C VIRUS HOLD SPECIMEN    Narrative:     Release to patient->Immediate   LACTIC ACID, PLASMA   MAGNESIUM   SARS-COV-2 RNA AMPLIFICATION, QUAL   LIPASE   TROPONIN I   LIPASE   URINALYSIS, REFLEX TO URINE CULTURE   HEPATITIS C RNA, QUANTITATIVE, PCR   DRUG SCREEN PANEL, URINE EMERGENCY        All Lab Results:  Results for orders placed or performed during the hospital encounter of 08/30/23   Influenza A & B by Molecular    Specimen: Nasopharyngeal Swab   Result Value Ref Range    Influenza A, Molecular Negative Negative    Influenza B, Molecular Negative Negative    Flu A & B Source Nasal swab    HIV 1/2 Ag/Ab (4th Gen)   Result Value Ref Range    HIV 1/2 Ag/Ab Negative Negative   Hepatitis C Antibody   Result Value Ref Range    Hepatitis C Ab Positive (A) Negative   HCV Virus Hold Specimen   Result Value Ref Range    HEP C Virus Hold Specimen Hold for HCV sendout    CBC auto differential   Result Value Ref Range    WBC 12.84 (H) 3.90 - 12.70 K/uL    RBC 4.11 (L) 4.60 - 6.20 M/uL    Hemoglobin 12.8 (L) 14.0 - 18.0 g/dL    Hematocrit 36.4 (L) 40.0 - 54.0 %    MCV 89 82 - 98 fL    MCH 31.1 (H) 27.0 - 31.0 pg    MCHC 35.2 32.0 - 36.0 g/dL    RDW 12.5 11.5 - 14.5 %    Platelets 204 150 - 450 K/uL    MPV 9.3 9.2 - 12.9 fL    Immature Granulocytes CANCELED 0.0 - 0.5 %    Immature Grans (Abs) CANCELED 0.00 - 0.04 K/uL    nRBC 0 0 /100 WBC    Gran % 45.0 38.0 - 73.0 %    Lymph % 15.0 (L) 18.0 - 48.0 %    Mono % 11.0 4.0 - 15.0 %    Eosinophil % 0.0 0.0 - 8.0 %    Basophil % 0.0 0.0 - 1.9 %    Bands 16.0 %    Metamyelocytes 12.0 %    Myelocytes 1.0 %    Platelet Estimate Appears normal     Differential Method Manual    Comprehensive metabolic panel   Result Value Ref Range    Sodium 137 136 - 145 mmol/L    Potassium 3.8 3.5 - 5.1 mmol/L    Chloride 107 95 - 110 mmol/L    CO2 19 (L) 23 - 29 mmol/L    Glucose 94  70 - 110 mg/dL    BUN 21 8 - 23 mg/dL    Creatinine 1.0 0.5 - 1.4 mg/dL    Calcium 8.8 8.7 - 10.5 mg/dL    Total Protein 6.3 6.0 - 8.4 g/dL    Albumin 3.0 (L) 3.5 - 5.2 g/dL    Total Bilirubin 2.0 (H) 0.1 - 1.0 mg/dL    Alkaline Phosphatase 87 55 - 135 U/L    AST 37 10 - 40 U/L    ALT 44 10 - 44 U/L    eGFR >60 >60 mL/min/1.73 m^2    Anion Gap 11 8 - 16 mmol/L   Lactic acid, plasma #1   Result Value Ref Range    Lactate (Lactic Acid) 1.7 0.5 - 2.2 mmol/L   Procalcitonin   Result Value Ref Range    Procalcitonin 9.96 (H) <0.25 ng/mL   Troponin I   Result Value Ref Range    Troponin I 0.039 (H) 0.000 - 0.026 ng/mL   Brain natriuretic peptide   Result Value Ref Range     (H) 0 - 99 pg/mL   Magnesium   Result Value Ref Range    Magnesium 1.6 1.6 - 2.6 mg/dL   COVID-19 Rapid Screening   Result Value Ref Range    SARS-CoV-2 RNA, Amplification, Qual Negative Negative   Troponin I   Result Value Ref Range    Troponin I 0.023 0.000 - 0.026 ng/mL   Lipase   Result Value Ref Range    Lipase 55 4 - 60 U/L   ISTAT PROCEDURE   Result Value Ref Range    POC PH 7.383 7.35 - 7.45    POC PCO2 35.0 35 - 45 mmHg    POC PO2 65 (L) 80 - 100 mmHg    POC HCO3 20.9 (L) 24 - 28 mmol/L    POC BE -4 -2 to 2 mmol/L    POC SATURATED O2 92 (L) 95 - 100 %    Sample ARTERIAL     Site RR     Allens Test Pass     DelSys Room Air         Imaging Results:  Imaging Results              CT Abdomen Pelvis With Contrast (Final result)  Result time 08/30/23 17:03:51      Final result by Ernesto Lindsey MD (08/30/23 17:03:51)                   Impression:      No acute intra-abdominal abnormality identified.  No bowel obstruction, intra-abdominal abscess, free fluid, or free air.      Electronically signed by: Ernesto Lindsey  Date:    08/30/2023  Time:    17:03               Narrative:    EXAMINATION:  CT ABDOMEN PELVIS WITH CONTRAST    CLINICAL HISTORY:  Sepsis;    TECHNIQUE:  Low dose axial images, sagittal and coronal reformations were obtained from  the lung bases to the pubic symphysis after  mL Omnipaque 350.    All CT scans at this location are performed using dose modulation techniques as appropriate to a performed exam including the following: Automated exposure control; adjustment of the mA and/or kV according to patient size.    COMPARISON:  None    FINDINGS:  Heart: Normal in size. No pericardial effusion.    Lung Bases: See chest CT same day    Liver: Normal in size and attenuation, with no focal hepatic lesions.    Gallbladder: No calcified gallstones.    Bile Ducts: No evidence of dilated ducts.    Pancreas: No mass or peripancreatic fat stranding.    Spleen: Calcified splenic cyst    Adrenals: Unremarkable.    Kidneys/ Ureters: Unremarkable.    Bladder: No evidence of wall thickening.    Reproductive organs: Unremarkable.    GI Tract/Mesentery: No evidence of bowel obstruction or inflammation.    Appendix: No evidence of appendictis.    Peritoneal Space: No ascites. No free air.    Retroperitoneum: No significant adenopathy.    Abdominal wall: Unremarkable.    Vasculature: No significant atherosclerosis or aneurysm.    Bones: No acute fracture.                                       CTA Chest Non-Coronary (PE Studies) (Final result)  Result time 08/30/23 17:01:04      Final result by Ernesto Lindsey MD (08/30/23 17:01:04)                   Impression:      No evidence of pulmonary embolism.    Severe consolidative pneumonia in the left lower lobe and to a lesser degree in the right lower lobe and right perihilar upper lobe.    Secretions filling the left lower lobe airway and in the right lower lobe peripheral airways.    All CT scans at this location are performed using dose modulation techniques as appropriate to a performed exam including the following: Automated exposure control; adjustment of the mA and/or kV  according to patient size.      Electronically signed by: Ernesto Lindsey  Date:    08/30/2023  Time:    17:01                Narrative:    EXAMINATION:  CTA CHEST NON CORONARY (PE STUDIES)    CLINICAL HISTORY:  Pulmonary embolism (PE) suspected, high prob;    TECHNIQUE:  Low dose axial images, sagittal and coronal reformations were obtained from the thoracic inlet to the lung bases CT PE protocol following the IV administration of 100 mL of Omnipaque 350.  MIP images also provided.    COMPARISON:  Chest radiograph same day    FINDINGS:  Base of Neck: No significant abnormality.    Thoracic soft tissues: Unremarkable.    Aorta: Left-sided aortic arch.  No aneurysm.    Heart: Normal size. No effusion. Mild aortic and coronary artery atherosclerotic calcification.    Pulmonary vasculature: Pulmonary arteries distribute normally.  No evidence of pulmonary embolism.    Susan/Mediastinum: No pathologic juan antonio enlargement.    Esophagus: Unremarkable.    Upper Abdomen: Calcified splenic cyst.    Airways: Secretions in the left lower lobe airway in the right lower lobe peripheral airways.    Lungs/Pleura: Severe consolidative pneumonia in the left lower lobe and to a lesser degree in the right lower lobe and right perihilar upper lobe.    Bones: No acute fracture. No suspicious lytic or sclerotic lesions.                                       X-Ray Chest AP Portable (Final result)  Result time 08/30/23 15:43:35      Final result by Jeff Daly MD (08/30/23 15:43:35)                   Impression:      See above.      Electronically signed by: Jeff Daly MD  Date:    08/30/2023  Time:    15:43               Narrative:    EXAMINATION:  XR CHEST AP PORTABLE    CLINICAL HISTORY:  Shortness of breath., Sepsis;    COMPARISON:  04/25/2020 x-ray.    FINDINGS:  Shallow inspiratory effort.    Mild cardiomegaly.    Decreased lung volumes.    Blunting of the left costophrenic angle may represent a small left effusion.                                     The EKG was ordered, reviewed, and independently interpreted by the ED  provider.  Interpretation time: 14:12  Rate: 110 BPM  Rhythm: Sinus tachycardia with occasional premature ventricular complexes  Interpretation: Minimal voltage criteria for LVH, may be normal variant (Sokolow-Aguilera). No STEMI.             The Emergency Provider reviewed the vital signs and test results, which are outlined above.    ED Discussion     4:00 PM: Dr. Mustafa transfers care of patient to Dr. Brink pending lab and radiology results.    5:45 PM: Discussed case with Dr. Segovia ([facility]). Dr. Segovia agrees with current care and management of pt and accepts admission.   Admitting Service: [facility]  Admitting Physician: Dr. Segovia  Admit to: Inpatient med/tel     5:45 PM: Re-evaluated pt. I have discussed test results, shared treatment plan, and the need for admission with patient and family at bedside. Pt and family express understanding at this time and agree with all information. All questions answered. Pt and family have no further questions or concerns at this time. Pt is ready for admit.          ED Medication(s):  Medications   amLODIPine tablet 10 mg (has no administration in time range)   aspirin EC tablet 81 mg (has no administration in time range)   sodium chloride 0.9% flush 10 mL (has no administration in time range)   cefTRIAXone (ROCEPHIN) 2 g in dextrose 5 % in water (D5W) 100 mL IVPB (MB+) (has no administration in time range)   azithromycin (ZITHROMAX) 500 mg in dextrose 5 % (D5W) 250 mL IVPB (Vial-Mate) (has no administration in time range)   HYDROcodone-acetaminophen 5-325 mg per tablet 1 tablet (has no administration in time range)   polyethylene glycol packet 17 g (has no administration in time range)   melatonin tablet 6 mg (has no administration in time range)   acetaminophen tablet 1,000 mg (has no administration in time range)   albuterol-ipratropium 2.5 mg-0.5 mg/3 mL nebulizer solution 3 mL (has no administration in time range)   budesonide nebulizer solution 0.5 mg  (has no administration in time range)   arformoteroL nebulizer solution 15 mcg (has no administration in time range)   hydrALAZINE injection 10 mg (has no administration in time range)   sodium chloride 0.9% bolus 1,000 mL 1,000 mL (1,000 mLs Intravenous New Bag 8/30/23 1446)   ibuprofen tablet 600 mg (600 mg Oral Given 8/30/23 1439)   albuterol-ipratropium 2.5 mg-0.5 mg/3 mL nebulizer solution 3 mL (3 mLs Nebulization Given 8/30/23 1532)   methylPREDNISolone sodium succinate injection 125 mg (125 mg Intravenous Given 8/30/23 1543)   morphine injection 4 mg (4 mg Intravenous Given 8/30/23 1600)   ondansetron injection 4 mg (4 mg Intravenous Given 8/30/23 1600)   iohexoL (OMNIPAQUE 350) injection 100 mL (100 mLs Intravenous Given 8/30/23 1609)   cefTRIAXone (ROCEPHIN) 1 g in dextrose 5 % in water (D5W) 100 mL IVPB (MB+) (1 g Intravenous New Bag 8/30/23 1658)           New Prescriptions    No medications on file         Medical Decision Making    Medical Decision Making  Differential diagnosis:  COVID, respiratory failure, pneumonia shortness of breath, sepsis    Amount and/or Complexity of Data Reviewed  Labs: ordered. Decision-making details documented in ED Course.     Details: Patient with hypoxemia with a PO2 of 65.  Has a normal troponin normal lipase positive hep C, 12,000 white count with left shift, elevated procalcitonin at 10 with a BNP of 115 lactate normal at 1.7 normal magnesium mild decrease in his CO2 at 19.    Radiology: ordered. Decision-making details documented in ED Course.     Details: Imaging of his chest chest x-ray was benign however CT of the abdomen and pelvis along with CT chest was also performed.  There is severe consolidative pneumonia left lower lobe in the right lower lobe along with right perihilar lobe.  CT abdomen pelvis was benign.  Patient was dosed with IV antibiotics in the ED.    ECG/medicine tests: ordered and independent interpretation performed. Decision-making details  documented in ED Course.     Details: Independent interpretation sinus tachycardia at 1:10 a.m..  PVCs  Discussion of management or test interpretation with external provider(s): Discussed the case with Dr. Segovia with Hospital Medicine.  Light of his hypoxemia and severe bilateral pneumonia, they have accepted the patient to inpatient status med tele.    Risk  Prescription drug management.  Decision regarding hospitalization.  Risk Details: Patient was severe bilateral pneumonia with hypoxemia.  He is advanced age.  His curb 65 class 1 however in light of his ill appearance, prolonged symptoms and significant multilobar infection patient being admitted to Hospital Medicine for further treatment.  Patient is aware and in agreement.           Medical Decision Making:   Clinical Tests:   Lab Tests: Ordered and Reviewed  Radiological Study: Ordered and Reviewed  Medical Tests: Ordered and Reviewed      Scribe Attestation:   Scribe #1: I performed the above scribed service and the documentation accurately describes the services I performed. I attest to the accuracy of the note.    Attending:   Physician Attestation Statement for Scribe #1: I, Mirta Mustafa MD, personally performed the services described in this documentation, as scribed by Mary Dowd, in my presence, and it is both accurate and complete.       Scribe Attestation:   Scribe #2: I performed the above scribed service and the documentation accurately describes the services I performed. I attest to the accuracy of the note.    Attending Attestation:           Physician Attestation for Scribe:    Physician Attestation Statement for Scribe #2: I, Delvin Brink Jr., MD, reviewed documentation, as scribed by Elba Thurston in my presence, and it is both accurate and complete. I also acknowledge and confirm the content of the note done by Scribe #1.          Clinical Impression       ICD-10-CM ICD-9-CM   1. Pneumonia of both lungs due to infectious  organism, unspecified part of lung  J18.9 483.8   2. Shortness of breath  R06.02 786.05       Disposition:   Disposition: Admitted  Condition: Delvin James Jr., MD  08/30/23 1835

## 2023-08-31 LAB
AMPHET+METHAMPHET UR QL: NEGATIVE
ANION GAP SERPL CALC-SCNC: 9 MMOL/L (ref 8–16)
BACTERIA #/AREA URNS HPF: NORMAL /HPF
BARBITURATES UR QL SCN>200 NG/ML: NEGATIVE
BASOPHILS NFR BLD: 0 % (ref 0–1.9)
BENZODIAZ UR QL SCN>200 NG/ML: NEGATIVE
BILIRUB UR QL STRIP: NEGATIVE
BUN SERPL-MCNC: 23 MG/DL (ref 8–23)
BZE UR QL SCN: ABNORMAL
CALCIUM SERPL-MCNC: 8.7 MG/DL (ref 8.7–10.5)
CANNABINOIDS UR QL SCN: ABNORMAL
CHLORIDE SERPL-SCNC: 109 MMOL/L (ref 95–110)
CLARITY UR: CLEAR
CO2 SERPL-SCNC: 21 MMOL/L (ref 23–29)
COLOR UR: YELLOW
CREAT SERPL-MCNC: 1.1 MG/DL (ref 0.5–1.4)
CREAT UR-MCNC: 84.1 MG/DL (ref 23–375)
DIFFERENTIAL METHOD: ABNORMAL
EOSINOPHIL NFR BLD: 0 % (ref 0–8)
ERYTHROCYTE [DISTWIDTH] IN BLOOD BY AUTOMATED COUNT: 12.4 % (ref 11.5–14.5)
EST. GFR  (NO RACE VARIABLE): >60 ML/MIN/1.73 M^2
GLUCOSE SERPL-MCNC: 159 MG/DL (ref 70–110)
GLUCOSE UR QL STRIP: ABNORMAL
HCT VFR BLD AUTO: 37.6 % (ref 40–54)
HCV RNA SERPL QL NAA+PROBE: NOT DETECTED
HCV RNA SPEC NAA+PROBE-ACNC: <12 IU/ML
HGB BLD-MCNC: 12.9 G/DL (ref 14–18)
HGB UR QL STRIP: NEGATIVE
IMM GRANULOCYTES # BLD AUTO: ABNORMAL K/UL (ref 0–0.04)
IMM GRANULOCYTES NFR BLD AUTO: ABNORMAL % (ref 0–0.5)
KETONES UR QL STRIP: NEGATIVE
LEUKOCYTE ESTERASE UR QL STRIP: NEGATIVE
LYMPHOCYTES NFR BLD: 3 % (ref 18–48)
MAGNESIUM SERPL-MCNC: 2.2 MG/DL (ref 1.6–2.6)
MCH RBC QN AUTO: 30.9 PG (ref 27–31)
MCHC RBC AUTO-ENTMCNC: 34.3 G/DL (ref 32–36)
MCV RBC AUTO: 90 FL (ref 82–98)
METHADONE UR QL SCN>300 NG/ML: NEGATIVE
MICROSCOPIC COMMENT: NORMAL
MONOCYTES NFR BLD: 2 % (ref 4–15)
MYELOCYTES NFR BLD MANUAL: 2 %
NEUTROPHILS NFR BLD: 60 % (ref 38–73)
NEUTS BAND NFR BLD MANUAL: 33 %
NITRITE UR QL STRIP: NEGATIVE
NRBC BLD-RTO: 0 /100 WBC
OPIATES UR QL SCN: ABNORMAL
PATH REV BLD -IMP: NORMAL
PCP UR QL SCN>25 NG/ML: NEGATIVE
PH UR STRIP: 6 [PH] (ref 5–8)
PHOSPHATE SERPL-MCNC: 1.9 MG/DL (ref 2.7–4.5)
PLATELET # BLD AUTO: 228 K/UL (ref 150–450)
PLATELET BLD QL SMEAR: ABNORMAL
PMV BLD AUTO: 9.8 FL (ref 9.2–12.9)
POTASSIUM SERPL-SCNC: 3.9 MMOL/L (ref 3.5–5.1)
PROT UR QL STRIP: ABNORMAL
RBC # BLD AUTO: 4.18 M/UL (ref 4.6–6.2)
SODIUM SERPL-SCNC: 139 MMOL/L (ref 136–145)
SP GR UR STRIP: >1.03 (ref 1–1.03)
TOXICOLOGY INFORMATION: ABNORMAL
URN SPEC COLLECT METH UR: ABNORMAL
UROBILINOGEN UR STRIP-ACNC: ABNORMAL EU/DL
WBC # BLD AUTO: 21.12 K/UL (ref 3.9–12.7)
YEAST URNS QL MICRO: NORMAL

## 2023-08-31 PROCEDURE — 84100 ASSAY OF PHOSPHORUS: CPT | Performed by: FAMILY MEDICINE

## 2023-08-31 PROCEDURE — 63600175 PHARM REV CODE 636 W HCPCS: Performed by: FAMILY MEDICINE

## 2023-08-31 PROCEDURE — 25000003 PHARM REV CODE 250: Performed by: FAMILY MEDICINE

## 2023-08-31 PROCEDURE — 80048 BASIC METABOLIC PNL TOTAL CA: CPT | Performed by: FAMILY MEDICINE

## 2023-08-31 PROCEDURE — 85027 COMPLETE CBC AUTOMATED: CPT | Performed by: FAMILY MEDICINE

## 2023-08-31 PROCEDURE — 94640 AIRWAY INHALATION TREATMENT: CPT

## 2023-08-31 PROCEDURE — 81000 URINALYSIS NONAUTO W/SCOPE: CPT | Mod: 59 | Performed by: FAMILY MEDICINE

## 2023-08-31 PROCEDURE — 87070 CULTURE OTHR SPECIMN AEROBIC: CPT | Performed by: FAMILY MEDICINE

## 2023-08-31 PROCEDURE — 11000001 HC ACUTE MED/SURG PRIVATE ROOM

## 2023-08-31 PROCEDURE — 80307 DRUG TEST PRSMV CHEM ANLYZR: CPT | Performed by: FAMILY MEDICINE

## 2023-08-31 PROCEDURE — 36415 COLL VENOUS BLD VENIPUNCTURE: CPT | Performed by: FAMILY MEDICINE

## 2023-08-31 PROCEDURE — 85007 BL SMEAR W/DIFF WBC COUNT: CPT | Performed by: FAMILY MEDICINE

## 2023-08-31 PROCEDURE — 83735 ASSAY OF MAGNESIUM: CPT | Performed by: FAMILY MEDICINE

## 2023-08-31 PROCEDURE — 25000242 PHARM REV CODE 250 ALT 637 W/ HCPCS: Performed by: FAMILY MEDICINE

## 2023-08-31 PROCEDURE — 94761 N-INVAS EAR/PLS OXIMETRY MLT: CPT

## 2023-08-31 PROCEDURE — 87205 SMEAR GRAM STAIN: CPT | Performed by: FAMILY MEDICINE

## 2023-08-31 RX ORDER — FAMOTIDINE 20 MG/1
20 TABLET, FILM COATED ORAL 2 TIMES DAILY
Status: DISCONTINUED | OUTPATIENT
Start: 2023-08-31 | End: 2023-09-01 | Stop reason: HOSPADM

## 2023-08-31 RX ORDER — TRIAMCINOLONE ACETONIDE 1 MG/G
OINTMENT TOPICAL 2 TIMES DAILY
Status: DISCONTINUED | OUTPATIENT
Start: 2023-08-31 | End: 2023-09-01 | Stop reason: HOSPADM

## 2023-08-31 RX ADMIN — BUDESONIDE INHALATION 0.5 MG: 0.5 SUSPENSION RESPIRATORY (INHALATION) at 07:08

## 2023-08-31 RX ADMIN — FAMOTIDINE 20 MG: 20 TABLET, FILM COATED ORAL at 09:08

## 2023-08-31 RX ADMIN — AMLODIPINE BESYLATE 10 MG: 10 TABLET ORAL at 09:08

## 2023-08-31 RX ADMIN — AZITHROMYCIN MONOHYDRATE 500 MG: 500 INJECTION, POWDER, LYOPHILIZED, FOR SOLUTION INTRAVENOUS at 08:08

## 2023-08-31 RX ADMIN — TRIAMCINOLONE ACETONIDE: 1 OINTMENT TOPICAL at 08:08

## 2023-08-31 RX ADMIN — HYDROCODONE BITARTRATE AND ACETAMINOPHEN 1 TABLET: 5; 325 TABLET ORAL at 09:08

## 2023-08-31 RX ADMIN — CEFTRIAXONE SODIUM 2 G: 2 INJECTION, POWDER, FOR SOLUTION INTRAMUSCULAR; INTRAVENOUS at 05:08

## 2023-08-31 RX ADMIN — BUDESONIDE INHALATION 0.5 MG: 0.5 SUSPENSION RESPIRATORY (INHALATION) at 08:08

## 2023-08-31 RX ADMIN — ASPIRIN 81 MG: 81 TABLET, COATED ORAL at 09:08

## 2023-08-31 RX ADMIN — TRIAMCINOLONE ACETONIDE: 1 OINTMENT TOPICAL at 02:08

## 2023-08-31 RX ADMIN — ARFORMOTEROL TARTRATE 15 MCG: 15 SOLUTION RESPIRATORY (INHALATION) at 08:08

## 2023-08-31 RX ADMIN — ARFORMOTEROL TARTRATE 15 MCG: 15 SOLUTION RESPIRATORY (INHALATION) at 07:08

## 2023-08-31 RX ADMIN — FAMOTIDINE 20 MG: 20 TABLET, FILM COATED ORAL at 02:08

## 2023-08-31 RX ADMIN — HYDROCODONE BITARTRATE AND ACETAMINOPHEN 1 TABLET: 5; 325 TABLET ORAL at 05:08

## 2023-08-31 NOTE — SUBJECTIVE & OBJECTIVE
Interval History: No issues overnight.     Review of Systems   Constitutional:  Negative for fatigue and fever.   HENT:  Negative for sinus pressure.    Eyes:  Negative for visual disturbance.   Respiratory:  Positive for cough. Negative for shortness of breath.    Cardiovascular:  Negative for chest pain.   Gastrointestinal:  Negative for nausea and vomiting.   Genitourinary:  Negative for difficulty urinating.   Musculoskeletal:  Negative for back pain.   Skin:  Negative for rash.   Neurological:  Negative for headaches.   Psychiatric/Behavioral:  Negative for confusion.      Objective:     Vital Signs (Most Recent):  Temp: 97.5 °F (36.4 °C) (08/31/23 1211)  Pulse: 65 (08/31/23 1211)  Resp: 19 (08/31/23 1211)  BP: 131/85 (08/31/23 1211)  SpO2: 95 % (08/31/23 1211) Vital Signs (24h Range):  Temp:  [97.5 °F (36.4 °C)-100.1 °F (37.8 °C)] 97.5 °F (36.4 °C)  Pulse:  [] 65  Resp:  [16-29] 19  SpO2:  [95 %-97 %] 95 %  BP: (116-134)/(67-85) 131/85     Weight: 77.1 kg (170 lb)  Body mass index is 25.1 kg/m².    Intake/Output Summary (Last 24 hours) at 8/31/2023 1250  Last data filed at 8/31/2023 0630  Gross per 24 hour   Intake --   Output 300 ml   Net -300 ml         Physical Exam  Constitutional:       General: He is not in acute distress.     Appearance: He is well-developed. He is not diaphoretic.   HENT:      Head: Normocephalic and atraumatic.   Eyes:      Pupils: Pupils are equal, round, and reactive to light.   Cardiovascular:      Rate and Rhythm: Normal rate and regular rhythm.      Heart sounds: Normal heart sounds. No murmur heard.     No friction rub. No gallop.   Pulmonary:      Effort: Pulmonary effort is normal. No respiratory distress.      Breath sounds: No stridor. Rhonchi present. No wheezing or rales.   Abdominal:      General: Bowel sounds are normal. There is no distension.      Palpations: Abdomen is soft. There is no mass.      Tenderness: There is no abdominal tenderness. There is no  guarding.   Skin:     General: Skin is warm.      Findings: Rash (on bilateral hands) present. No erythema.   Neurological:      Mental Status: He is alert and oriented to person, place, and time.             Significant Labs: All pertinent labs within the past 24 hours have been reviewed.    Significant Imaging: I have reviewed all pertinent imaging results/findings within the past 24 hours.

## 2023-08-31 NOTE — PLAN OF CARE
IV ABT therapy remains in progress. No adverse reactions noted, remains afebrile. Independent with ADL's. Heart monitor 8560. PRN pain meds adm as needed. Remains free from incident/injury. Call light in reach. All active orders reviewed. Chart check complete.

## 2023-08-31 NOTE — ASSESSMENT & PLAN NOTE
This patient does have evidence of infective focus  My overall impression is sepsis.  Source: Respiratory  Antibiotics given-   Antibiotics (72h ago, onward)    Start     Stop Route Frequency Ordered    08/31/23 1700  cefTRIAXone (ROCEPHIN) 2 g in dextrose 5 % in water (D5W) 100 mL IVPB (MB+)  (Community Acquired Pneumonia (CAP) - Low MDR Risk)         09/06/23 1659 IV Every 24 hours (non-standard times) 08/30/23 1806    08/30/23 1915  azithromycin (ZITHROMAX) 500 mg in dextrose 5 % (D5W) 250 mL IVPB (Vial-Mate)  (Community Acquired Pneumonia (CAP) - Low MDR Risk)         09/04/23 1914 IV Every 24 hours (non-standard times) 08/30/23 1806        Latest lactate reviewed-  Recent Labs   Lab 08/30/23  1440   LACTATE 1.7     Organ dysfunction indicated by Acute respiratory failure    Fluid challenge Not needed - patient is not hypotensive      Post- resuscitation assessment No - Post resuscitation assessment not needed       Will Not start Pressors- Levophed for MAP of 65  Source control achieved by: antibiotics    Cont rocephin and azithro  Sputum culture

## 2023-08-31 NOTE — PROGRESS NOTES
[facility] Medicine  Progress Note    Patient Name: Maureen Luther  MRN: [id number]  Patient Class: IP- Inpatient   Admission Date: 8/30/2023  Length of Stay: 1 days  Attending Physician: Rafael Knight MD  Primary Care Provider: Belia, Primary Doctor        Subjective:     Principal Problem:Sepsis        HPI:  Mr Luther is a 63 year old male with a history of HTN, Hep C (never treated), cocaine abuse (no longer using), depression who presents to the ED with complaints of dyspnea, ab pain, and cough over the last 3-5 days.  He reports he was seen at [facility], given steroids and nebulizer and sent home but has not improved.  He denies any fever/chills, N/V/D.  In the Ed, patient tachycardiac, tachypenic.  Lab work notable for WBC 12.8 , bili 2.0, procal 9.  CT ab pelvis with acute processes.  CTA chest negative for PE, but showed severe consolidative pneumonia LLL and RLL.  Patient started on IV abx.  Patient will be admitted to hospital medicine for sepsis d/t bilateral pneumonia     Code Status Full   Surrogate Decision Maker Edith, significant other       Overview/Hospital Course:  8/31: cont rocephin and azithro, sputum culture pending. Hep c positive, will need outpatient referral to hepatology.       Interval History: No issues overnight.     Review of Systems   Constitutional:  Negative for fatigue and fever.   HENT:  Negative for sinus pressure.    Eyes:  Negative for visual disturbance.   Respiratory:  Positive for cough. Negative for shortness of breath.    Cardiovascular:  Negative for chest pain.   Gastrointestinal:  Negative for nausea and vomiting.   Genitourinary:  Negative for difficulty urinating.   Musculoskeletal:  Negative for back pain.   Skin:  Negative for rash.   Neurological:  Negative for headaches.   Psychiatric/Behavioral:  Negative for confusion.      Objective:     Vital Signs (Most Recent):  Temp: 97.5 °F (36.4 °C) (08/31/23 1211)  Pulse: 65 (08/31/23 1211)  Resp: 19  (08/31/23 1211)  BP: 131/85 (08/31/23 1211)  SpO2: 95 % (08/31/23 1211) Vital Signs (24h Range):  Temp:  [97.5 °F (36.4 °C)-100.1 °F (37.8 °C)] 97.5 °F (36.4 °C)  Pulse:  [] 65  Resp:  [16-29] 19  SpO2:  [95 %-97 %] 95 %  BP: (116-134)/(67-85) 131/85     Weight: 77.1 kg (170 lb)  Body mass index is 25.1 kg/m².    Intake/Output Summary (Last 24 hours) at 8/31/2023 1250  Last data filed at 8/31/2023 0630  Gross per 24 hour   Intake --   Output 300 ml   Net -300 ml         Physical Exam  Constitutional:       General: He is not in acute distress.     Appearance: He is well-developed. He is not diaphoretic.   HENT:      Head: Normocephalic and atraumatic.   Eyes:      Pupils: Pupils are equal, round, and reactive to light.   Cardiovascular:      Rate and Rhythm: Normal rate and regular rhythm.      Heart sounds: Normal heart sounds. No murmur heard.     No friction rub. No gallop.   Pulmonary:      Effort: Pulmonary effort is normal. No respiratory distress.      Breath sounds: No stridor. Rhonchi present. No wheezing or rales.   Abdominal:      General: Bowel sounds are normal. There is no distension.      Palpations: Abdomen is soft. There is no mass.      Tenderness: There is no abdominal tenderness. There is no guarding.   Skin:     General: Skin is warm.      Findings: Rash (on bilateral hands) present. No erythema.   Neurological:      Mental Status: He is alert and oriented to person, place, and time.             Significant Labs: All pertinent labs within the past 24 hours have been reviewed.    Significant Imaging: I have reviewed all pertinent imaging results/findings within the past 24 hours.        Assessment/Plan:      * Sepsis  This patient does have evidence of infective focus  My overall impression is sepsis.  Source: Respiratory  Antibiotics given-   Antibiotics (72h ago, onward)    Start     Stop Route Frequency Ordered    08/31/23 1700  cefTRIAXone (ROCEPHIN) 2 g in dextrose 5 % in water (D5W)  100 mL IVPB (MB+)  (Community Acquired Pneumonia (CAP) - Low MDR Risk)         09/06/23 1659 IV Every 24 hours (non-standard times) 08/30/23 1806 08/30/23 1915  azithromycin (ZITHROMAX) 500 mg in dextrose 5 % (D5W) 250 mL IVPB (Vial-Mate)  (Community Acquired Pneumonia (CAP) - Low MDR Risk)         09/04/23 1914 IV Every 24 hours (non-standard times) 08/30/23 1806        Latest lactate reviewed-  Recent Labs   Lab 08/30/23  1440   LACTATE 1.7     Organ dysfunction indicated by Acute respiratory failure    Fluid challenge Not needed - patient is not hypotensive      Post- resuscitation assessment No - Post resuscitation assessment not needed       Will Not start Pressors- Levophed for MAP of 65  Source control achieved by: antibiotics    Cont rocephin and azithro  Sputum culture    Hepatitis C  - Follow up outpatient with [facility].  - CM consulted for [facility] referral.      Hyperbilirubinemia  - Likely 2/2 hep C  - Follow up with Hepatology as an outpatient.  - Known hx of Hep C - untreated  - CT abdomen: No acute intra-abdominal abnormality identified.  No bowel obstruction, intra-abdominal abscess, free fluid, or free air.       Pneumonia of both lungs due to infectious organism  - CTA chest: Severe consolidative pneumonia in the left lower lobe and to a lesser degree in the right lower lobe and right perihilar upper lobe. Secretions filling the left lower lobe airway and in the right lower lobe peripheral airways. No pulmonary embolism.  - Continue Rocephin, add azithromycin  - ABG pending  - Duoneb PRN      Uncontrolled hypertension  - continue home meds  - Hydralazine PRN        VTE Risk Mitigation (From admission, onward)         Ordered     IP VTE LOW RISK PATIENT  Once         08/30/23 1806     Place sequential compression device  Until discontinued         08/30/23 1806                Discharge Planning   LUANN:      Code Status: Full Code   Is the patient medically ready for discharge?:     Reason for  patient still in hospital (select all that apply): Patient trending condition  Discharge Plan A: Home with family                  Rafael Knight MD  Department of Hospital Medicine   [facility]

## 2023-08-31 NOTE — HOSPITAL COURSE
Patient was admitted for sepsis secondary to pna. He was started on rocephin and azithromycin. Hep c ab positive however viral load negative. Patient clinically improved. WBC elevated however started to trend down on day of discharge. He was continued on cefdinir and azithromycin. Patient discharged home.

## 2023-08-31 NOTE — PLAN OF CARE
Discussed poc with pt, pt verbalized understanding    Purposeful rounding every 2hours    VS wnl  Cardiac monitoring in use, pt is NSR  Fall precautions in place, remains injury free  Pt denies c/o SOB on RA  Pain under control with PRN meds    Accurate I&Os  Abx given as prescribed  Bed locked at lowest position  Call light within reach    Chart check complete  Will cont with POC

## 2023-08-31 NOTE — PLAN OF CARE
[facility]  Initial Discharge Assessment       Primary Care Provider: Belia, Primary Doctor    Admission Diagnosis: Shortness of breath [R06.02]  Pneumonia of both lungs due to infectious organism, unspecified part of lung [J18.9]    Admission Date: 8/30/2023  Expected Discharge Date: Per Attending     Transition of Care Barriers: None    Payor: MEDICAID / Plan: [employer] [facility] / Product Type: Managed Medicaid /     Extended Emergency Contact Information  Primary Emergency Contact: Edith Gordon  Address: [address] [place] [address] [address] [place] [address]  Home Phone: [phone]  Mobile Phone: [phone]  Relation: Significant other    Discharge Plan A: Home with family       No Pharmacies Listed    Initial Assessment (most recent)       Adult Discharge Assessment - 08/31/23 1037          Discharge Assessment    Assessment Type Discharge Planning Assessment     Confirmed/corrected address, phone number and insurance Yes     Confirmed Demographics Correct on Facesheet     Source of Information patient     Communicated LUANN with patient/caregiver Date not available/Unable to determine     Reason For Admission sepsis     People in Home significant other     Do you expect to return to your current living situation? Yes     Do you have help at home or someone to help you manage your care at home? Yes     Who are your caregiver(s) and their phone number(s)? Spouse     Prior to hospitilization cognitive status: Alert/Oriented     Current cognitive status: Alert/Oriented     Equipment Currently Used at Home none     Readmission within 30 days? No     Patient currently being followed by outpatient case management? No     Do you take prescription medications? Yes     Do you have prescription coverage? Yes     Coverage [employer] Medicaid     Do you have any problems affording any of your prescribed medications? No     Is the patient taking medications as prescribed? yes      Who is going to help you get home at discharge? Spouse     How do you get to doctors appointments? car, drives self     Are you on dialysis? No     Do you take coumadin? No     DME Needed Upon Discharge  none     Discharge Plan discussed with: Patient     Transition of Care Barriers None     Discharge Plan A Home with family                      Sw met with patient to complete assessment and to discuss d/c planning needs. Patient has no d/c needs at this time. Sw to follow up, as needed, for d/c planning purposes.     [facility] referral sent for Hepatology f/u upon d/c.

## 2023-09-01 VITALS
WEIGHT: 170 LBS | TEMPERATURE: 99 F | RESPIRATION RATE: 22 BRPM | OXYGEN SATURATION: 95 % | HEART RATE: 66 BPM | HEIGHT: 69 IN | BODY MASS INDEX: 25.18 KG/M2 | DIASTOLIC BLOOD PRESSURE: 91 MMHG | SYSTOLIC BLOOD PRESSURE: 138 MMHG

## 2023-09-01 LAB
ANION GAP SERPL CALC-SCNC: 11 MMOL/L (ref 8–16)
ANISOCYTOSIS BLD QL SMEAR: SLIGHT
BASOPHILS # BLD AUTO: 0.05 K/UL (ref 0–0.2)
BASOPHILS # BLD AUTO: 0.05 K/UL (ref 0–0.2)
BASOPHILS NFR BLD: 0.2 % (ref 0–1.9)
BASOPHILS NFR BLD: 0.3 % (ref 0–1.9)
BUN SERPL-MCNC: 22 MG/DL (ref 8–23)
CALCIUM SERPL-MCNC: 8.3 MG/DL (ref 8.7–10.5)
CHLORIDE SERPL-SCNC: 109 MMOL/L (ref 95–110)
CO2 SERPL-SCNC: 20 MMOL/L (ref 23–29)
CREAT SERPL-MCNC: 0.9 MG/DL (ref 0.5–1.4)
DACRYOCYTES BLD QL SMEAR: ABNORMAL
DIFFERENTIAL METHOD: ABNORMAL
DIFFERENTIAL METHOD: ABNORMAL
EOSINOPHIL # BLD AUTO: 0.1 K/UL (ref 0–0.5)
EOSINOPHIL # BLD AUTO: 0.2 K/UL (ref 0–0.5)
EOSINOPHIL NFR BLD: 0.4 % (ref 0–8)
EOSINOPHIL NFR BLD: 0.9 % (ref 0–8)
ERYTHROCYTE [DISTWIDTH] IN BLOOD BY AUTOMATED COUNT: 12 % (ref 11.5–14.5)
ERYTHROCYTE [DISTWIDTH] IN BLOOD BY AUTOMATED COUNT: 12.3 % (ref 11.5–14.5)
EST. GFR  (NO RACE VARIABLE): >60 ML/MIN/1.73 M^2
GLUCOSE SERPL-MCNC: 83 MG/DL (ref 70–110)
HCT VFR BLD AUTO: 34 % (ref 40–54)
HCT VFR BLD AUTO: 35.1 % (ref 40–54)
HCV RNA SERPL QL NAA+PROBE: NOT DETECTED
HCV RNA SPEC NAA+PROBE-ACNC: <12 IU/ML
HGB BLD-MCNC: 11.8 G/DL (ref 14–18)
HGB BLD-MCNC: 12.2 G/DL (ref 14–18)
IMM GRANULOCYTES # BLD AUTO: 0.21 K/UL (ref 0–0.04)
IMM GRANULOCYTES # BLD AUTO: 0.35 K/UL (ref 0–0.04)
IMM GRANULOCYTES NFR BLD AUTO: 1.1 % (ref 0–0.5)
IMM GRANULOCYTES NFR BLD AUTO: 1.6 % (ref 0–0.5)
LYMPHOCYTES # BLD AUTO: 1.5 K/UL (ref 1–4.8)
LYMPHOCYTES # BLD AUTO: 1.8 K/UL (ref 1–4.8)
LYMPHOCYTES NFR BLD: 6.9 % (ref 18–48)
LYMPHOCYTES NFR BLD: 9.2 % (ref 18–48)
MAGNESIUM SERPL-MCNC: 2.1 MG/DL (ref 1.6–2.6)
MCH RBC QN AUTO: 30.8 PG (ref 27–31)
MCH RBC QN AUTO: 31 PG (ref 27–31)
MCHC RBC AUTO-ENTMCNC: 34.7 G/DL (ref 32–36)
MCHC RBC AUTO-ENTMCNC: 34.8 G/DL (ref 32–36)
MCV RBC AUTO: 89 FL (ref 82–98)
MCV RBC AUTO: 89 FL (ref 82–98)
MONOCYTES # BLD AUTO: 0.6 K/UL (ref 0.3–1)
MONOCYTES # BLD AUTO: 0.6 K/UL (ref 0.3–1)
MONOCYTES NFR BLD: 2.8 % (ref 4–15)
MONOCYTES NFR BLD: 3.2 % (ref 4–15)
NEUTROPHILS # BLD AUTO: 16.9 K/UL (ref 1.8–7.7)
NEUTROPHILS # BLD AUTO: 19.4 K/UL (ref 1.8–7.7)
NEUTROPHILS NFR BLD: 85.3 % (ref 38–73)
NEUTROPHILS NFR BLD: 88.1 % (ref 38–73)
NRBC BLD-RTO: 0 /100 WBC
NRBC BLD-RTO: 0 /100 WBC
PHOSPHATE SERPL-MCNC: 1.6 MG/DL (ref 2.7–4.5)
PLATELET # BLD AUTO: 254 K/UL (ref 150–450)
PLATELET # BLD AUTO: 269 K/UL (ref 150–450)
PLATELET BLD QL SMEAR: ABNORMAL
PMV BLD AUTO: 10 FL (ref 9.2–12.9)
PMV BLD AUTO: 10.1 FL (ref 9.2–12.9)
POCT GLUCOSE: 132 MG/DL (ref 70–110)
POTASSIUM SERPL-SCNC: 3.4 MMOL/L (ref 3.5–5.1)
PROCALCITONIN SERPL IA-MCNC: 5.56 NG/ML
RBC # BLD AUTO: 3.81 M/UL (ref 4.6–6.2)
RBC # BLD AUTO: 3.96 M/UL (ref 4.6–6.2)
SODIUM SERPL-SCNC: 140 MMOL/L (ref 136–145)
TARGETS BLD QL SMEAR: ABNORMAL
TROPONIN I SERPL DL<=0.01 NG/ML-MCNC: 0.02 NG/ML (ref 0–0.03)
WBC # BLD AUTO: 19.78 K/UL (ref 3.9–12.7)
WBC # BLD AUTO: 22.01 K/UL (ref 3.9–12.7)

## 2023-09-01 PROCEDURE — 93010 EKG 12-LEAD: ICD-10-PCS | Mod: ,,, | Performed by: STUDENT IN AN ORGANIZED HEALTH CARE EDUCATION/TRAINING PROGRAM

## 2023-09-01 PROCEDURE — 93010 ELECTROCARDIOGRAM REPORT: CPT | Mod: ,,, | Performed by: STUDENT IN AN ORGANIZED HEALTH CARE EDUCATION/TRAINING PROGRAM

## 2023-09-01 PROCEDURE — 25000003 PHARM REV CODE 250: Performed by: FAMILY MEDICINE

## 2023-09-01 PROCEDURE — 25000242 PHARM REV CODE 250 ALT 637 W/ HCPCS: Performed by: FAMILY MEDICINE

## 2023-09-01 PROCEDURE — 93005 ELECTROCARDIOGRAM TRACING: CPT

## 2023-09-01 PROCEDURE — 94761 N-INVAS EAR/PLS OXIMETRY MLT: CPT

## 2023-09-01 PROCEDURE — 85025 COMPLETE CBC W/AUTO DIFF WBC: CPT | Mod: 91 | Performed by: FAMILY MEDICINE

## 2023-09-01 PROCEDURE — 84100 ASSAY OF PHOSPHORUS: CPT | Performed by: FAMILY MEDICINE

## 2023-09-01 PROCEDURE — 36415 COLL VENOUS BLD VENIPUNCTURE: CPT | Performed by: FAMILY MEDICINE

## 2023-09-01 PROCEDURE — 36415 COLL VENOUS BLD VENIPUNCTURE: CPT | Performed by: EMERGENCY MEDICINE

## 2023-09-01 PROCEDURE — 84145 PROCALCITONIN (PCT): CPT | Performed by: FAMILY MEDICINE

## 2023-09-01 PROCEDURE — 84484 ASSAY OF TROPONIN QUANT: CPT | Performed by: FAMILY MEDICINE

## 2023-09-01 PROCEDURE — 85025 COMPLETE CBC W/AUTO DIFF WBC: CPT | Performed by: FAMILY MEDICINE

## 2023-09-01 PROCEDURE — 94640 AIRWAY INHALATION TREATMENT: CPT

## 2023-09-01 PROCEDURE — 83735 ASSAY OF MAGNESIUM: CPT | Performed by: FAMILY MEDICINE

## 2023-09-01 PROCEDURE — 80048 BASIC METABOLIC PNL TOTAL CA: CPT | Performed by: FAMILY MEDICINE

## 2023-09-01 RX ORDER — AZITHROMYCIN 500 MG/1
500 TABLET, FILM COATED ORAL NIGHTLY
Qty: 1 TABLET | Refills: 0 | Status: SHIPPED | OUTPATIENT
Start: 2023-09-01 | End: 2023-09-02

## 2023-09-01 RX ORDER — PROMETHAZINE HYDROCHLORIDE AND DEXTROMETHORPHAN HYDROBROMIDE 6.25; 15 MG/5ML; MG/5ML
5 SYRUP ORAL EVERY 4 HOURS PRN
Qty: 120 ML | Refills: 0 | Status: SHIPPED | OUTPATIENT
Start: 2023-09-01 | End: 2023-09-11

## 2023-09-01 RX ORDER — CEFDINIR 300 MG/1
300 CAPSULE ORAL 2 TIMES DAILY
Qty: 10 CAPSULE | Refills: 0 | Status: SHIPPED | OUTPATIENT
Start: 2023-09-01 | End: 2023-09-06

## 2023-09-01 RX ORDER — SODIUM,POTASSIUM PHOSPHATES 280-250MG
1 POWDER IN PACKET (EA) ORAL ONCE
Status: COMPLETED | OUTPATIENT
Start: 2023-09-01 | End: 2023-09-01

## 2023-09-01 RX ORDER — POTASSIUM CHLORIDE 20 MEQ/1
40 TABLET, EXTENDED RELEASE ORAL ONCE
Status: COMPLETED | OUTPATIENT
Start: 2023-09-01 | End: 2023-09-01

## 2023-09-01 RX ADMIN — ARFORMOTEROL TARTRATE 15 MCG: 15 SOLUTION RESPIRATORY (INHALATION) at 07:09

## 2023-09-01 RX ADMIN — AMLODIPINE BESYLATE 10 MG: 10 TABLET ORAL at 09:09

## 2023-09-01 RX ADMIN — ASPIRIN 81 MG: 81 TABLET, COATED ORAL at 09:09

## 2023-09-01 RX ADMIN — HYDROCODONE BITARTRATE AND ACETAMINOPHEN 1 TABLET: 5; 325 TABLET ORAL at 01:09

## 2023-09-01 RX ADMIN — TRIAMCINOLONE ACETONIDE: 1 OINTMENT TOPICAL at 09:09

## 2023-09-01 RX ADMIN — POTASSIUM CHLORIDE 40 MEQ: 1500 TABLET, EXTENDED RELEASE ORAL at 03:09

## 2023-09-01 RX ADMIN — BUDESONIDE INHALATION 0.5 MG: 0.5 SUSPENSION RESPIRATORY (INHALATION) at 07:09

## 2023-09-01 RX ADMIN — Medication 1 PACKET: at 09:09

## 2023-09-01 RX ADMIN — FAMOTIDINE 20 MG: 20 TABLET, FILM COATED ORAL at 09:09

## 2023-09-01 RX ADMIN — HYDROCODONE BITARTRATE AND ACETAMINOPHEN 1 TABLET: 5; 325 TABLET ORAL at 05:09

## 2023-09-01 RX ADMIN — IPRATROPIUM BROMIDE AND ALBUTEROL SULFATE 3 ML: .5; 3 SOLUTION RESPIRATORY (INHALATION) at 01:09

## 2023-09-01 NOTE — DISCHARGE SUMMARY
'[facility] Medicine  Discharge Summary      Patient Name: Maureen Luther  MRN: [id number]  CAROLINE: [id number]  Patient Class: IP- Inpatient  Admission Date: 8/30/2023  Hospital Length of Stay: 2 days  Discharge Date and Time:  09/01/2023 4:23 PM  Attending Physician: Rafael Knight MD   Discharging Provider: Rafael Knight MD  Primary Care Provider: Belia Primary Doctor    Primary Care Team: [facility] MEDICINE A    HPI:   Mr Luther is a 63 year old male with a history of HTN, Hep C (never treated), cocaine abuse (no longer using), depression who presents to the ED with complaints of dyspnea, ab pain, and cough over the last 3-5 days.  He reports he was seen at [facility], given steroids and nebulizer and sent home but has not improved.  He denies any fever/chills, N/V/D.  In the Ed, patient tachycardiac, tachypenic.  Lab work notable for WBC 12.8 , bili 2.0, procal 9.  CT ab pelvis with acute processes.  CTA chest negative for PE, but showed severe consolidative pneumonia LLL and RLL.  Patient started on IV abx.  Patient will be admitted to hospital medicine for sepsis d/t bilateral pneumonia     Code Status Full   Surrogate Decision Maker Edith, significant other       * No surgery found *      Hospital Course:   Patient was admitted for sepsis secondary to pna. He was started on rocephin and azithromycin. Hep c ab positive however viral load negative. Patient clinically improved. WBC elevated however started to trend down on day of discharge. He was continued on cefdinir and azithromycin. Patient discharged home.            Goals of Care Treatment Preferences:  Code Status: Full Code      Consults:   Consults (From admission, onward)        Status Ordering Provider     Inpatient consult to Social Work  Once        Provider:  (Not yet assigned)    Completed JENNIFER LEMUS          No new Assessment & Plan notes have been filed under this hospital service since the last note was generated.  Service: Hospital  Medicine    Final Active Diagnoses:    Diagnosis Date Noted POA    PRINCIPAL PROBLEM:  Sepsis [A41.9] 08/30/2023 Yes    Hyperbilirubinemia [E80.6] 08/30/2023 Unknown    Pneumonia of both lungs due to infectious organism [J18.9] 08/30/2023 Yes    Hepatitis C [B19.20] 08/30/2023 Yes    Uncontrolled hypertension [I10] 03/10/2020 Yes    Tobacco abuse [Z72.0] 07/21/2016 Yes     Chronic      Problems Resolved During this Admission:       Discharged Condition: good    Disposition: Home or Self Care    Follow Up:   Follow-up Information     No, Primary Doctor Follow up.                     Patient Instructions:      Ambulatory referral/consult to Hepatology   Standing Status: Future   Referral Priority: Routine Referral Type: Consultation   Referral Reason: Specialty Services Required   Requested Specialty: Hepatology   Number of Visits Requested: 1       Significant Diagnostic Studies: Labs:   BMP:   Recent Labs   Lab 08/31/23  0540 09/01/23  0525   * 83    140   K 3.9 3.4*    109   CO2 21* 20*   BUN 23 22   CREATININE 1.1 0.9   CALCIUM 8.7 8.3*   MG 2.2 2.1    and CBC   Recent Labs   Lab 08/31/23  0540 09/01/23  0525 09/01/23  1255   WBC 21.12* 22.01* 19.78*   HGB 12.9* 12.2* 11.8*   HCT 37.6* 35.1* 34.0*    254 269       Pending Diagnostic Studies:     None         Medications:  Reconciled Home Medications:      Medication List      START taking these medications    azithromycin 500 MG tablet  Commonly known as: ZITHROMAX  Take 1 tablet (500 mg total) by mouth every evening. for 1 day     cefdinir 300 MG capsule  Commonly known as: OMNICEF  Take 1 capsule (300 mg total) by mouth 2 (two) times daily. for 5 days     promethazine-dextromethorphan 6.25-15 mg/5 mL Syrp  Commonly known as: PROMETHAZINE-DM  Take 5 mLs by mouth every 4 (four) hours as needed.        CONTINUE taking these medications    albuterol 90 mcg/actuation inhaler  Commonly known as: PROVENTIL/VENTOLIN HFA  2 puffs every 4  (four) hours as needed.     amLODIPine 10 MG tablet  Commonly known as: NORVASC  Take 1 tablet (10 mg total) by mouth once daily.     aspirin 81 MG EC tablet  Commonly known as: ECOTRIN  Take 1 tablet by mouth once daily.     HYDROcodone-acetaminophen  mg per tablet  Commonly known as: NORCO  Take 1 tablet by mouth 4 (four) times daily as needed.     nitroGLYCERIN 0.4 MG SL tablet  Commonly known as: NITROSTAT  Take 1 tablet by mouth.     SYMBICORT 80-4.5 mcg/actuation aa  Generic drug: budesonide-formoterol 80-4.5 mcg  Inhale 2 puffs into the lungs once daily.        STOP taking these medications    predniSONE 20 MG tablet  Commonly known as: DELTASONE            Indwelling Lines/Drains at time of discharge:   Lines/Drains/Airways     None                 Time spent on the discharge of patient: 37 minutes         Rafael Knight MD  Department of Hospital Medicine  [facility]

## 2023-09-01 NOTE — PLAN OF CARE
[facility]  Discharge Final Note    Primary Care Provider: No, Primary Doctor    Expected Discharge Date: 9/1/2023    Final Discharge Note (most recent)       Final Note - 09/01/23 1323          Final Note    Assessment Type Final Discharge Note     Anticipated Discharge Disposition Home or Self Care        Post-Acute Status    Discharge Delays None known at this time                     Contact Info       No, Primary Doctor   Relationship: PCP - General        Next Steps: Follow up          [facility] to follow up with pt upon d/c regarding Hepatology appt.     No additional d/c needs at this time.

## 2023-09-01 NOTE — PLAN OF CARE
Patient remains free from injury this shift. Safety precautions maintained. No s/s of acute distress noted. Pain controlled per MD order.. Cardiac monitoring in place. Ms ad at the bedside. Side rails up X2 and bed alarm refused. Pt prefers room to be kept cold. Chart and orders review complete. Patient education about care complete.       Problem: Adult Inpatient Plan of Care  Goal: Plan of Care Review  Outcome: Ongoing, Progressing  Goal: Patient-Specific Goal (Individualized)  Outcome: Ongoing, Progressing  Goal: Absence of Hospital-Acquired Illness or Injury  Outcome: Ongoing, Progressing  Goal: Optimal Comfort and Wellbeing  Outcome: Ongoing, Progressing  Goal: Readiness for Transition of Care  Outcome: Ongoing, Progressing     Problem: Adjustment to Illness (Sepsis/Septic Shock)  Goal: Optimal Coping  Outcome: Ongoing, Progressing     Problem: Infection Progression (Sepsis/Septic Shock)  Goal: Absence of Infection Signs and Symptoms  Outcome: Ongoing, Progressing     Problem: Glycemic Control Impaired (Sepsis/Septic Shock)  Goal: Blood Glucose Level Within Desired Range  Outcome: Ongoing, Progressing     Problem: Infection (Pneumonia)  Goal: Resolution of Infection Signs and Symptoms  Outcome: Ongoing, Progressing

## 2023-09-01 NOTE — PLAN OF CARE
Patient ready for discharge, IV and telemetry removed as ordered. Discharge instructions reviewed with the patient, verbalized understanding. Medications delivered to the bedside.

## 2023-09-02 LAB
BACTERIA SPEC AEROBE CULT: NORMAL
GRAM STN SPEC: NORMAL

## 2023-09-04 LAB
BACTERIA BLD CULT: NORMAL
BACTERIA BLD CULT: NORMAL

## 2023-09-15 ENCOUNTER — TELEPHONE (OUTPATIENT)
Dept: PRIMARY CARE CLINIC | Facility: CLINIC | Age: 63
End: 2023-09-15
Payer: MEDICAID

## 2023-09-15 ENCOUNTER — HOSPITAL ENCOUNTER (OUTPATIENT)
Dept: RADIOLOGY | Facility: HOSPITAL | Age: 63
Discharge: HOME | End: 2023-09-15
Attending: FAMILY MEDICINE
Payer: MEDICAID

## 2023-09-15 ENCOUNTER — OFFICE VISIT (OUTPATIENT)
Dept: PRIMARY CARE CLINIC | Facility: CLINIC | Age: 63
End: 2023-09-15
Payer: MEDICAID

## 2023-09-15 VITALS
BODY MASS INDEX: 25.23 KG/M2 | HEIGHT: 69 IN | SYSTOLIC BLOOD PRESSURE: 138 MMHG | DIASTOLIC BLOOD PRESSURE: 86 MMHG | TEMPERATURE: 99 F | WEIGHT: 170.38 LBS | HEART RATE: 74 BPM | OXYGEN SATURATION: 98 %

## 2023-09-15 DIAGNOSIS — J45.20 INTERMITTENT ASTHMA WITHOUT COMPLICATION, UNSPECIFIED ASTHMA SEVERITY: ICD-10-CM

## 2023-09-15 DIAGNOSIS — T50.2X5A DIURETIC-INDUCED HYPOKALEMIA: ICD-10-CM

## 2023-09-15 DIAGNOSIS — J18.9 PNEUMONIA OF BOTH LUNGS DUE TO INFECTIOUS ORGANISM, UNSPECIFIED PART OF LUNG: ICD-10-CM

## 2023-09-15 DIAGNOSIS — F14.10 COCAINE ABUSE: Chronic | ICD-10-CM

## 2023-09-15 DIAGNOSIS — E87.6 DIURETIC-INDUCED HYPOKALEMIA: ICD-10-CM

## 2023-09-15 DIAGNOSIS — E78.2 MIXED HYPERLIPIDEMIA: ICD-10-CM

## 2023-09-15 DIAGNOSIS — Z09 HOSPITAL DISCHARGE FOLLOW-UP: Primary | ICD-10-CM

## 2023-09-15 DIAGNOSIS — J90 PLEURAL EFFUSION: ICD-10-CM

## 2023-09-15 DIAGNOSIS — G89.29 CHRONIC RIGHT HIP PAIN: ICD-10-CM

## 2023-09-15 DIAGNOSIS — M25.551 CHRONIC RIGHT HIP PAIN: ICD-10-CM

## 2023-09-15 DIAGNOSIS — R73.09 ABNORMAL GLUCOSE: ICD-10-CM

## 2023-09-15 PROCEDURE — 99999 PR PBB SHADOW E&M-EST. PATIENT-LVL IV: ICD-10-PCS | Mod: PBBFAC,,, | Performed by: FAMILY MEDICINE

## 2023-09-15 PROCEDURE — 1111F PR DISCHARGE MEDS RECONCILED W/ CURRENT OUTPATIENT MED LIST: ICD-10-PCS | Mod: CPTII,,, | Performed by: FAMILY MEDICINE

## 2023-09-15 PROCEDURE — 3075F PR MOST RECENT SYSTOLIC BLOOD PRESS GE 130-139MM HG: ICD-10-PCS | Mod: CPTII,,, | Performed by: FAMILY MEDICINE

## 2023-09-15 PROCEDURE — 71046 X-RAY EXAM CHEST 2 VIEWS: CPT | Mod: 26,,, | Performed by: RADIOLOGY

## 2023-09-15 PROCEDURE — 3079F PR MOST RECENT DIASTOLIC BLOOD PRESSURE 80-89 MM HG: ICD-10-PCS | Mod: CPTII,,, | Performed by: FAMILY MEDICINE

## 2023-09-15 PROCEDURE — 71046 X-RAY EXAM CHEST 2 VIEWS: CPT | Mod: TC,PN

## 2023-09-15 PROCEDURE — 3008F BODY MASS INDEX DOCD: CPT | Mod: CPTII,,, | Performed by: FAMILY MEDICINE

## 2023-09-15 PROCEDURE — 71046 XR CHEST PA AND LATERAL: ICD-10-PCS | Mod: 26,,, | Performed by: RADIOLOGY

## 2023-09-15 PROCEDURE — 3075F SYST BP GE 130 - 139MM HG: CPT | Mod: CPTII,,, | Performed by: FAMILY MEDICINE

## 2023-09-15 PROCEDURE — 1159F MED LIST DOCD IN RCRD: CPT | Mod: CPTII,,, | Performed by: FAMILY MEDICINE

## 2023-09-15 PROCEDURE — 99204 OFFICE O/P NEW MOD 45 MIN: CPT | Mod: S$PBB,,, | Performed by: FAMILY MEDICINE

## 2023-09-15 PROCEDURE — 3079F DIAST BP 80-89 MM HG: CPT | Mod: CPTII,,, | Performed by: FAMILY MEDICINE

## 2023-09-15 PROCEDURE — 1111F DSCHRG MED/CURRENT MED MERGE: CPT | Mod: CPTII,,, | Performed by: FAMILY MEDICINE

## 2023-09-15 PROCEDURE — 99999 PR PBB SHADOW E&M-EST. PATIENT-LVL IV: CPT | Mod: PBBFAC,,, | Performed by: FAMILY MEDICINE

## 2023-09-15 PROCEDURE — 1159F PR MEDICATION LIST DOCUMENTED IN MEDICAL RECORD: ICD-10-PCS | Mod: CPTII,,, | Performed by: FAMILY MEDICINE

## 2023-09-15 PROCEDURE — 3008F PR BODY MASS INDEX (BMI) DOCUMENTED: ICD-10-PCS | Mod: CPTII,,, | Performed by: FAMILY MEDICINE

## 2023-09-15 PROCEDURE — 99204 PR OFFICE/OUTPT VISIT, NEW, LEVL IV, 45-59 MIN: ICD-10-PCS | Mod: S$PBB,,, | Performed by: FAMILY MEDICINE

## 2023-09-15 PROCEDURE — 99214 OFFICE O/P EST MOD 30 MIN: CPT | Mod: PBBFAC,25,PN | Performed by: FAMILY MEDICINE

## 2023-09-15 RX ORDER — POTASSIUM CHLORIDE 20 MEQ/1
20 TABLET, EXTENDED RELEASE ORAL 2 TIMES DAILY
Qty: 60 TABLET | Refills: 0 | Status: SHIPPED | OUTPATIENT
Start: 2023-09-15 | End: 2023-10-15

## 2023-09-15 RX ORDER — BUDESONIDE AND FORMOTEROL FUMARATE DIHYDRATE 80; 4.5 UG/1; UG/1
2 AEROSOL RESPIRATORY (INHALATION) DAILY
Qty: 10.2 G | Refills: 5 | Status: SHIPPED | OUTPATIENT
Start: 2023-09-15 | End: 2023-09-15 | Stop reason: SDUPTHER

## 2023-09-15 RX ORDER — IBUPROFEN 800 MG/1
800 TABLET ORAL EVERY 8 HOURS PRN
Qty: 30 TABLET | Refills: 0 | Status: SHIPPED | OUTPATIENT
Start: 2023-09-15 | End: 2023-09-15 | Stop reason: SDUPTHER

## 2023-09-15 RX ORDER — FLUTICASONE PROPIONATE AND SALMETEROL XINAFOATE 115; 21 UG/1; UG/1
2 AEROSOL, METERED RESPIRATORY (INHALATION) 2 TIMES DAILY
COMMUNITY
Start: 2023-09-12 | End: 2023-09-15 | Stop reason: ALTCHOICE

## 2023-09-15 RX ORDER — FUROSEMIDE 20 MG/1
20 TABLET ORAL DAILY
Qty: 30 TABLET | Refills: 0 | Status: SHIPPED | OUTPATIENT
Start: 2023-09-15 | End: 2023-10-10 | Stop reason: ALTCHOICE

## 2023-09-15 RX ORDER — PREDNISONE 20 MG/1
40 TABLET ORAL DAILY
Qty: 10 TABLET | Refills: 0 | Status: SHIPPED | OUTPATIENT
Start: 2023-09-15 | End: 2023-09-20

## 2023-09-15 RX ORDER — ALBUTEROL SULFATE 90 UG/1
2 AEROSOL, METERED RESPIRATORY (INHALATION) EVERY 4 HOURS PRN
Qty: 18 G | Refills: 11 | Status: SHIPPED | OUTPATIENT
Start: 2023-09-15 | End: 2024-09-14

## 2023-09-15 RX ORDER — PREDNISONE 20 MG/1
40 TABLET ORAL DAILY
Qty: 10 TABLET | Refills: 0 | Status: SHIPPED | OUTPATIENT
Start: 2023-09-15 | End: 2023-09-15 | Stop reason: SDUPTHER

## 2023-09-15 RX ORDER — IBUPROFEN 800 MG/1
800 TABLET ORAL EVERY 8 HOURS PRN
Qty: 30 TABLET | Refills: 0 | Status: SHIPPED | OUTPATIENT
Start: 2023-09-15 | End: 2023-09-25

## 2023-09-15 RX ORDER — BUDESONIDE AND FORMOTEROL FUMARATE DIHYDRATE 80; 4.5 UG/1; UG/1
2 AEROSOL RESPIRATORY (INHALATION) DAILY
Qty: 10.2 G | Refills: 5 | Status: SHIPPED | OUTPATIENT
Start: 2023-09-15 | End: 2023-10-10 | Stop reason: DRUGHIGH

## 2023-09-15 NOTE — PROGRESS NOTES
Subjective:       Patient ID: Maureen Luther is a 63 y.o. male.    Chief Complaint: Other Misc (HOSPITAL F/U-PNEUMONIA (LEG PAIN/SIDE PAIN))      History of Present Illness:   Maureen Luther 63 y.o. male presents today with   HPI     Other Misc     Additional comments: HOSPITAL F/U-PNEUMONIA (LEG PAIN/SIDE PAIN)          Last edited by Montrell Felix MA on 9/15/2023 11:18 AM.      Transitional Care Note    Family and/or Caretaker present at visit?  Yes.  Diagnostic tests reviewed/disposition: I have reviewed all completed as well as pending diagnostic tests at the time of discharge.  Disease/illness education: Bilateral pneumonia, substance abuse, Hypokalemia  Home health/community services discussion/referrals: Patient does not have home health established from hospital visit.  They do not need home health.  If needed, we will set up home health for the patient.   Establishment or re-establishment of referral orders for community resources: No other necessary community resources.   Discussion with other health care providers: No discussion with other health care providers necessary.   Today, he is pacing due to pain to the right hip which is chronic-has been advised to get it replaced, but also pain down his legs-withdrawing from drugs likely, has not had any since his admission.  Said he was trying to deal with the hip and knee pain.  Pneumonia: Still coughing up a lot of mucus and chest conjunction.  With asthma exacerbation-was switched to Advair by urgent care.  Reports the Advair does not work for him a what works his Symbicort.  Asking for refill.  He has the albuterol inhaler but he is having to use it very frequently without the Symbicort.  Also asking for more prednisone and antibiotic.     Past Medical History:   Diagnosis Date    Asthma     Facial fracture     Hepatitis C antibody positive in blood 08/30/2023    RNA NEGATIVE    Medical history non-contributory     Scrotal abscess      Family  History   Problem Relation Age of Onset    Hypertension Unknown      Social History     Socioeconomic History    Marital status: Legally    Occupational History    Occupation: [employer]    Tobacco Use    Smoking status: Former     Current packs/day: 1.00     Types: Cigarettes   Substance and Sexual Activity    Alcohol use: No    Drug use: Not Currently     Types: Marijuana     Outpatient Encounter Medications as of 9/15/2023   Medication Sig Dispense Refill    aspirin (ECOTRIN) 81 MG EC tablet Take 1 tablet by mouth once daily.      HYDROcodone-acetaminophen (NORCO)  mg per tablet Take 1 tablet by mouth 4 (four) times daily as needed.      nitroGLYCERIN (NITROSTAT) 0.4 MG SL tablet Take 1 tablet by mouth.      [DISCONTINUED] ADVAIR -21 mcg/actuation HFAA inhaler Inhale 2 puffs into the lungs 2 (two) times daily.      albuterol (PROVENTIL/VENTOLIN HFA) 90 mcg/actuation inhaler Inhale 2 puffs into the lungs every 4 (four) hours as needed for Wheezing or Shortness of Breath. 18 g 11    amLODIPine (NORVASC) 10 MG tablet Take 1 tablet (10 mg total) by mouth once daily. 30 tablet 11    [] cefdinir (OMNICEF) 300 MG capsule Take 1 capsule (300 mg total) by mouth 2 (two) times daily. for 5 days 10 capsule 0    furosemide (LASIX) 20 MG tablet Take 1 tablet (20 mg total) by mouth once daily. 30 tablet 0    ibuprofen (ADVIL,MOTRIN) 800 MG tablet Take 1 tablet (800 mg total) by mouth every 8 (eight) hours as needed for Pain. 30 tablet 0    potassium chloride SA (K-DUR,KLOR-CON) 20 MEQ tablet Take 1 tablet (20 mEq total) by mouth 2 (two) times daily. 60 tablet 0    predniSONE (DELTASONE) 20 MG tablet Take 2 tablets (40 mg total) by mouth once daily. for 5 days 10 tablet 0    [] promethazine-dextromethorphan (PROMETHAZINE-DM) 6.25-15 mg/5 mL Syrp Take 5 mLs by mouth every 4 (four) hours as needed. 120 mL 0    SYMBICORT 80-4.5 mcg/actuation HFAA Inhale 2 puffs into the lungs once daily. 10.2 g 5     "[DISCONTINUED] albuterol (PROVENTIL/VENTOLIN HFA) 90 mcg/actuation inhaler 2 puffs every 4 (four) hours as needed.      [DISCONTINUED] ibuprofen (ADVIL,MOTRIN) 800 MG tablet Take 1 tablet (800 mg total) by mouth every 8 (eight) hours as needed for Pain. 30 tablet 0    [DISCONTINUED] predniSONE (DELTASONE) 20 MG tablet Take 2 tablets (40 mg total) by mouth once daily. for 5 days 10 tablet 0    [DISCONTINUED] SYMBICORT 80-4.5 mcg/actuation HFAA Inhale 2 puffs into the lungs once daily.      [DISCONTINUED] SYMBICORT 80-4.5 mcg/actuation HFAA Inhale 2 puffs into the lungs once daily. 10.2 g 5     No facility-administered encounter medications on file as of 9/15/2023.       Review of Systems         A complete 10 point ROS was completed and are positive as per above HPI. Otherwise negative for fever, diplopia, chest pain, shortness of breath, vomiting, blood in urine, joint pain, skin rash, seizures and unusual bleeding.     Objective:      /86 (BP Location: Right arm, Patient Position: Sitting, BP Method: Medium (Manual))   Pulse 74   Temp 98.7 °F (37.1 °C)   Ht 5' 9" (1.753 m)   Wt 77.3 kg (170 lb 6.4 oz)   SpO2 98%   BMI 25.16 kg/m²   Physical Exam  HENT:      Head: Normocephalic.   Cardiovascular:      Rate and Rhythm: Normal rate.   Pulmonary:      Breath sounds: Wheezing and rales present.   Musculoskeletal:      Right hip: Tenderness present. Decreased range of motion.      Right knee: Decreased range of motion. Tenderness present.      Left knee: Decreased range of motion. Tenderness present.        Legs:    Neurological:      General: No focal deficit present.      Mental Status: He is alert.         CONSTITUTIONAL: No apparent distress. Appears comfortable. Does not appear acutely ill or septic. Appears adequately hydrated.  CARDIOVASCULAR: No perioral cyanosis  PULMONARY: Breathing unlabored. No retractions Chest expansion grossly normal.  PSYCHIATRIC: Alert and conversant and grossly oriented. " Mood is grossly neutral. Affect appropriate. Judgment and insight grossly intact.  NEUROLOGIC: No focal sensory deficits reported.   Results for orders placed or performed during the hospital encounter of 08/30/23   Blood culture x two cultures. Draw prior to antibiotics.    Specimen: Peripheral, Wrist, Left; Blood   Result Value Ref Range    Blood Culture, Routine No growth after 5 days.    Blood culture x two cultures. Draw prior to antibiotics.    Specimen: Peripheral, Forearm, Right; Blood   Result Value Ref Range    Blood Culture, Routine No growth after 5 days.    Influenza A & B by Molecular    Specimen: Nasopharyngeal Swab   Result Value Ref Range    Influenza A, Molecular Negative Negative    Influenza B, Molecular Negative Negative    Flu A & B Source Nasal swab    Culture, Respiratory with Gram Stain    Specimen: Sputum, Expectorated; Respiratory   Result Value Ref Range    Respiratory Culture Normal respiratory rubio     Gram Stain (Respiratory) <10 epithelial cells per low power field.     Gram Stain (Respiratory) Many WBC's     Gram Stain (Respiratory) Rare Gram positive rods     Gram Stain (Respiratory) Rare Gram positive cocci    HIV 1/2 Ag/Ab (4th Gen)   Result Value Ref Range    HIV 1/2 Ag/Ab Negative Negative   Hepatitis C Antibody   Result Value Ref Range    Hepatitis C Ab Positive (A) Negative   HCV Virus Hold Specimen   Result Value Ref Range    HEP C Virus Hold Specimen Hold for HCV sendout    CBC auto differential   Result Value Ref Range    WBC 12.84 (H) 3.90 - 12.70 K/uL    RBC 4.11 (L) 4.60 - 6.20 M/uL    Hemoglobin 12.8 (L) 14.0 - 18.0 g/dL    Hematocrit 36.4 (L) 40.0 - 54.0 %    MCV 89 82 - 98 fL    MCH 31.1 (H) 27.0 - 31.0 pg    MCHC 35.2 32.0 - 36.0 g/dL    RDW 12.5 11.5 - 14.5 %    Platelets 204 150 - 450 K/uL    MPV 9.3 9.2 - 12.9 fL    Immature Granulocytes CANCELED 0.0 - 0.5 %    Immature Grans (Abs) CANCELED 0.00 - 0.04 K/uL    nRBC 0 0 /100 WBC    Gran % 45.0 38.0 - 73.0 %     Lymph % 15.0 (L) 18.0 - 48.0 %    Mono % 11.0 4.0 - 15.0 %    Eosinophil % 0.0 0.0 - 8.0 %    Basophil % 0.0 0.0 - 1.9 %    Bands 16.0 %    Metamyelocytes 12.0 %    Myelocytes 1.0 %    Platelet Estimate Appears normal     Differential Method Manual    Comprehensive metabolic panel   Result Value Ref Range    Sodium 137 136 - 145 mmol/L    Potassium 3.8 3.5 - 5.1 mmol/L    Chloride 107 95 - 110 mmol/L    CO2 19 (L) 23 - 29 mmol/L    Glucose 94 70 - 110 mg/dL    BUN 21 8 - 23 mg/dL    Creatinine 1.0 0.5 - 1.4 mg/dL    Calcium 8.8 8.7 - 10.5 mg/dL    Total Protein 6.3 6.0 - 8.4 g/dL    Albumin 3.0 (L) 3.5 - 5.2 g/dL    Total Bilirubin 2.0 (H) 0.1 - 1.0 mg/dL    Alkaline Phosphatase 87 55 - 135 U/L    AST 37 10 - 40 U/L    ALT 44 10 - 44 U/L    eGFR >60 >60 mL/min/1.73 m^2    Anion Gap 11 8 - 16 mmol/L   Lactic acid, plasma #1   Result Value Ref Range    Lactate (Lactic Acid) 1.7 0.5 - 2.2 mmol/L   Procalcitonin   Result Value Ref Range    Procalcitonin 9.96 (H) <0.25 ng/mL   Troponin I   Result Value Ref Range    Troponin I 0.039 (H) 0.000 - 0.026 ng/mL   Brain natriuretic peptide   Result Value Ref Range     (H) 0 - 99 pg/mL   Magnesium   Result Value Ref Range    Magnesium 1.6 1.6 - 2.6 mg/dL   COVID-19 Rapid Screening   Result Value Ref Range    SARS-CoV-2 RNA, Amplification, Qual Negative Negative   Troponin I   Result Value Ref Range    Troponin I 0.023 0.000 - 0.026 ng/mL   Lipase   Result Value Ref Range    Lipase 55 4 - 60 U/L   Hepatitis C RNA, Quantitative, PCR   Result Value Ref Range    HCV Quantitative Result <12 <12 IU/mL    HCV, Qualitative Not Detected Not Detected   Drug screen panel, in-house   Result Value Ref Range    Benzodiazepines Negative Negative    Methadone metabolites Negative Negative    Cocaine (Metab.) Presumptive Positive (A) Negative    Opiate Scrn, Ur Presumptive Positive (A) Negative    Barbiturate Screen, Ur Negative Negative    Amphetamine Screen, Ur Negative Negative    THC  Presumptive Positive (A) Negative    Phencyclidine Negative Negative    Creatinine, Urine 84.1 23.0 - 375.0 mg/dL    Toxicology Information SEE COMMENT    Basic Metabolic Panel (BMP)   Result Value Ref Range    Sodium 139 136 - 145 mmol/L    Potassium 3.9 3.5 - 5.1 mmol/L    Chloride 109 95 - 110 mmol/L    CO2 21 (L) 23 - 29 mmol/L    Glucose 159 (H) 70 - 110 mg/dL    BUN 23 8 - 23 mg/dL    Creatinine 1.1 0.5 - 1.4 mg/dL    Calcium 8.7 8.7 - 10.5 mg/dL    Anion Gap 9 8 - 16 mmol/L    eGFR >60 >60 mL/min/1.73 m^2   Magnesium   Result Value Ref Range    Magnesium 2.2 1.6 - 2.6 mg/dL   Phosphorus   Result Value Ref Range    Phosphorus 1.9 (L) 2.7 - 4.5 mg/dL   CBC auto differential   Result Value Ref Range    WBC 21.12 (H) 3.90 - 12.70 K/uL    RBC 4.18 (L) 4.60 - 6.20 M/uL    Hemoglobin 12.9 (L) 14.0 - 18.0 g/dL    Hematocrit 37.6 (L) 40.0 - 54.0 %    MCV 90 82 - 98 fL    MCH 30.9 27.0 - 31.0 pg    MCHC 34.3 32.0 - 36.0 g/dL    RDW 12.4 11.5 - 14.5 %    Platelets 228 150 - 450 K/uL    MPV 9.8 9.2 - 12.9 fL    Immature Granulocytes CANCELED 0.0 - 0.5 %    Immature Grans (Abs) CANCELED 0.00 - 0.04 K/uL    nRBC 0 0 /100 WBC    Gran % 60.0 38.0 - 73.0 %    Lymph % 3.0 (L) 18.0 - 48.0 %    Mono % 2.0 (L) 4.0 - 15.0 %    Eosinophil % 0.0 0.0 - 8.0 %    Basophil % 0.0 0.0 - 1.9 %    Bands 33.0 %    Myelocytes 2.0 %    Platelet Estimate Appears normal     Differential Method Manual    Pathologist Review   Result Value Ref Range    Pathologist Review Peripheral Smear REVIEWED    Urinalysis, Reflex to Urine Culture   Result Value Ref Range    Specimen UA Urine, Unspecified     Color, UA Yellow Yellow, Straw, Mariana    Appearance, UA Clear Clear    pH, UA 6.0 5.0 - 8.0    Specific Gravity, UA >1.030 (A) 1.005 - 1.030    Protein, UA Trace (A) Negative    Glucose, UA 4+ (A) Negative    Ketones, UA Negative Negative    Bilirubin (UA) Negative Negative    Occult Blood UA Negative Negative    Nitrite, UA Negative Negative     Urobilinogen, UA 2.0-3.0 (A) <2.0 EU/dL    Leukocytes, UA Negative Negative   Urinalysis Microscopic   Result Value Ref Range    Bacteria None None-Occ /hpf    Yeast, UA None None    Microscopic Comment SEE COMMENT    Basic Metabolic Panel (BMP)   Result Value Ref Range    Sodium 140 136 - 145 mmol/L    Potassium 3.4 (L) 3.5 - 5.1 mmol/L    Chloride 109 95 - 110 mmol/L    CO2 20 (L) 23 - 29 mmol/L    Glucose 83 70 - 110 mg/dL    BUN 22 8 - 23 mg/dL    Creatinine 0.9 0.5 - 1.4 mg/dL    Calcium 8.3 (L) 8.7 - 10.5 mg/dL    Anion Gap 11 8 - 16 mmol/L    eGFR >60 >60 mL/min/1.73 m^2   Magnesium   Result Value Ref Range    Magnesium 2.1 1.6 - 2.6 mg/dL   Phosphorus   Result Value Ref Range    Phosphorus 1.6 (L) 2.7 - 4.5 mg/dL   CBC auto differential   Result Value Ref Range    WBC 22.01 (H) 3.90 - 12.70 K/uL    RBC 3.96 (L) 4.60 - 6.20 M/uL    Hemoglobin 12.2 (L) 14.0 - 18.0 g/dL    Hematocrit 35.1 (L) 40.0 - 54.0 %    MCV 89 82 - 98 fL    MCH 30.8 27.0 - 31.0 pg    MCHC 34.8 32.0 - 36.0 g/dL    RDW 12.0 11.5 - 14.5 %    Platelets 254 150 - 450 K/uL    MPV 10.1 9.2 - 12.9 fL    Immature Granulocytes 1.6 (H) 0.0 - 0.5 %    Gran # (ANC) 19.4 (H) 1.8 - 7.7 K/uL    Immature Grans (Abs) 0.35 (H) 0.00 - 0.04 K/uL    Lymph # 1.5 1.0 - 4.8 K/uL    Mono # 0.6 0.3 - 1.0 K/uL    Eos # 0.1 0.0 - 0.5 K/uL    Baso # 0.05 0.00 - 0.20 K/uL    nRBC 0 0 /100 WBC    Gran % 88.1 (H) 38.0 - 73.0 %    Lymph % 6.9 (L) 18.0 - 48.0 %    Mono % 2.8 (L) 4.0 - 15.0 %    Eosinophil % 0.4 0.0 - 8.0 %    Basophil % 0.2 0.0 - 1.9 %    Platelet Estimate Appears normal     Aniso Slight     Target Cells CANCELED     Tear Drop Cells Occasional     Differential Method Automated    Hepatitis C RNA, Quantitative, PCR   Result Value Ref Range    HCV Quantitative Result <12 <12 IU/mL    HCV, Qualitative Not Detected Not Detected   Procalcitonin   Result Value Ref Range    Procalcitonin 5.56 (H) <0.25 ng/mL   CBC auto differential   Result Value Ref Range    WBC  19.78 (H) 3.90 - 12.70 K/uL    RBC 3.81 (L) 4.60 - 6.20 M/uL    Hemoglobin 11.8 (L) 14.0 - 18.0 g/dL    Hematocrit 34.0 (L) 40.0 - 54.0 %    MCV 89 82 - 98 fL    MCH 31.0 27.0 - 31.0 pg    MCHC 34.7 32.0 - 36.0 g/dL    RDW 12.3 11.5 - 14.5 %    Platelets 269 150 - 450 K/uL    MPV 10.0 9.2 - 12.9 fL    Immature Granulocytes 1.1 (H) 0.0 - 0.5 %    Gran # (ANC) 16.9 (H) 1.8 - 7.7 K/uL    Immature Grans (Abs) 0.21 (H) 0.00 - 0.04 K/uL    Lymph # 1.8 1.0 - 4.8 K/uL    Mono # 0.6 0.3 - 1.0 K/uL    Eos # 0.2 0.0 - 0.5 K/uL    Baso # 0.05 0.00 - 0.20 K/uL    nRBC 0 0 /100 WBC    Gran % 85.3 (H) 38.0 - 73.0 %    Lymph % 9.2 (L) 18.0 - 48.0 %    Mono % 3.2 (L) 4.0 - 15.0 %    Eosinophil % 0.9 0.0 - 8.0 %    Basophil % 0.3 0.0 - 1.9 %    Differential Method Automated    Troponin I   Result Value Ref Range    Troponin I 0.016 0.000 - 0.026 ng/mL   ISTAT PROCEDURE   Result Value Ref Range    POC PH 7.383 7.35 - 7.45    POC PCO2 35.0 35 - 45 mmHg    POC PO2 65 (L) 80 - 100 mmHg    POC HCO3 20.9 (L) 24 - 28 mmol/L    POC BE -4 -2 to 2 mmol/L    POC SATURATED O2 92 (L) 95 - 100 %    Sample ARTERIAL     Site RR     Allens Test Pass     DelSys Room Air    POCT glucose   Result Value Ref Range    POCT Glucose 132 (H) 70 - 110 mg/dL     Assessment:       1. Hospital discharge follow-up    2. Pneumonia of both lungs due to infectious organism, unspecified part of lung    3. Cocaine abuse with THC use and abuse of prescription opiods    4. Pleural effusion    5. Abnormal glucose    6. Mixed hyperlipidemia    7. Chronic right hip pain    8. Intermittent asthma without complication, unspecified asthma severity    9. Diuretic-induced hypokalemia        Plan:   Hospital discharge follow-up  Comments:  cxr to check for resolution, repeat lab-abnormal at the time of his discharge, white count due to pneumonia     Pneumonia of both lungs due to infectious organism, unspecified part of lung  -     X-Ray Chest PA And Lateral; Future; Expected  date: 09/15/2023  -     Comprehensive Metabolic Panel; Future; Expected date: 09/15/2023  -     CBC Auto Differential; Future; Expected date: 09/15/2023    Cocaine abuse with THC use and abuse of prescription opiods  -     Toxicology Screen, Urine; Future; Expected date: 09/15/2023    Pleural effusion  -     BNP; Future; Expected date: 09/15/2023  -     Ambulatory referral/consult to Pulmonology; Future; Expected date: 09/22/2023  -     X-Ray Chest PA And Lateral; Future; Expected date: 09/18/2023  -     furosemide (LASIX) 20 MG tablet; Take 1 tablet (20 mg total) by mouth once daily.  Dispense: 30 tablet; Refill: 0    Abnormal glucose  -     Hemoglobin A1C; Future; Expected date: 09/15/2023    Mixed hyperlipidemia  -     Lipid Panel; Future; Expected date: 09/15/2023    Chronic right hip pain  -     Discontinue: ibuprofen (ADVIL,MOTRIN) 800 MG tablet; Take 1 tablet (800 mg total) by mouth every 8 (eight) hours as needed for Pain.  Dispense: 30 tablet; Refill: 0  -     ibuprofen (ADVIL,MOTRIN) 800 MG tablet; Take 1 tablet (800 mg total) by mouth every 8 (eight) hours as needed for Pain.  Dispense: 30 tablet; Refill: 0    Intermittent asthma without complication, unspecified asthma severity  -     Discontinue: SYMBICORT 80-4.5 mcg/actuation HFAA; Inhale 2 puffs into the lungs once daily.  Dispense: 10.2 g; Refill: 5  -     albuterol (PROVENTIL/VENTOLIN HFA) 90 mcg/actuation inhaler; Inhale 2 puffs into the lungs every 4 (four) hours as needed for Wheezing or Shortness of Breath.  Dispense: 18 g; Refill: 11  -     Discontinue: predniSONE (DELTASONE) 20 MG tablet; Take 2 tablets (40 mg total) by mouth once daily. for 5 days  Dispense: 10 tablet; Refill: 0  -     predniSONE (DELTASONE) 20 MG tablet; Take 2 tablets (40 mg total) by mouth once daily. for 5 days  Dispense: 10 tablet; Refill: 0  -     SYMBICORT 80-4.5 mcg/actuation HFAA; Inhale 2 puffs into the lungs once daily.  Dispense: 10.2 g; Refill:  5    Diuretic-induced hypokalemia  -     potassium chloride SA (K-DUR,KLOR-CON) 20 MEQ tablet; Take 1 tablet (20 mEq total) by mouth 2 (two) times daily.  Dispense: 60 tablet; Refill: 0        I have reviewed all of the patient's clinical history available in care everywhere and Epic and have utilized this in my evaluation and management recommendations today.      Treatment options and alternatives were discussed with the patient. Patient was given ample time to ask questions. All questions were answered. Voices understanding and acceptance of this advice. Will call back if any further questions or concerns.                Kaitlyn Monique MD  [facility],

## 2023-09-15 NOTE — PROGRESS NOTES
Xray shows that the pneumonia is resolved but yo do have a small amount of fluid in the left lung.  Plan of care:  1.Take lasix with potassium as ordered.  2. Repeat xray on Monday  3.Referral has been sent to the pulmonologist for further eval-expect from the dept a call for an appt.  4. Go to the ER if sxs gets worse over the weekend.

## 2023-09-15 NOTE — TELEPHONE ENCOUNTER
Return the call to inform him that once the results has returned the provider will review and determine if she will prescribe anything. Patient verbalized his understanding        ----- Message from Jayne Sosa sent at 9/15/2023  1:56 PM CDT -----  Contact: Patient, [phone]  Calling because he was seen this morning and wants to know why he was not prescribed any antibiotics. Please advise. Thanks.

## 2023-09-19 ENCOUNTER — TELEPHONE (OUTPATIENT)
Dept: PRIMARY CARE CLINIC | Facility: CLINIC | Age: 63
End: 2023-09-19
Payer: MEDICAID

## 2023-09-19 NOTE — TELEPHONE ENCOUNTER
Called the patient to inform him that Xray shows that the pneumonia is resolved but yo do have a small amount of fluid in the left lung.  Plan of care:  1.Take lasix with potassium as ordered.  2. Repeat xray on Monday  3.Referral has been sent to the pulmonologist for further eval-expect from the dept a call for an appt.  4. Go to the ER if sxs gets worse over the weekend.  Patient verbalized his understanding            ----- Message from Jenifer Shipley sent at 9/19/2023  1:14 PM CDT -----  Contact: [phone]  [email or web address]    Type: Test Results    What test was performed? xray    Who ordered the test? Dr Castillo    When and where were the test performed? [facility] 09/15/23    Would you like response via [employer]: no     Comments:pt is calling for the xray results please advise

## 2023-10-10 ENCOUNTER — TELEPHONE (OUTPATIENT)
Dept: PRIMARY CARE CLINIC | Facility: CLINIC | Age: 63
End: 2023-10-10

## 2023-10-10 ENCOUNTER — OFFICE VISIT (OUTPATIENT)
Dept: PRIMARY CARE CLINIC | Facility: CLINIC | Age: 63
End: 2023-10-10
Payer: MEDICAID

## 2023-10-10 ENCOUNTER — HOSPITAL ENCOUNTER (OUTPATIENT)
Dept: RADIOLOGY | Facility: HOSPITAL | Age: 63
Discharge: HOME | End: 2023-10-10
Attending: FAMILY MEDICINE
Payer: MEDICAID

## 2023-10-10 VITALS
TEMPERATURE: 99 F | SYSTOLIC BLOOD PRESSURE: 135 MMHG | BODY MASS INDEX: 25.5 KG/M2 | HEIGHT: 69 IN | OXYGEN SATURATION: 98 % | DIASTOLIC BLOOD PRESSURE: 83 MMHG | HEART RATE: 75 BPM | WEIGHT: 172.19 LBS

## 2023-10-10 DIAGNOSIS — I25.10 CORONARY ARTERY DISEASE INVOLVING NATIVE CORONARY ARTERY OF NATIVE HEART WITHOUT ANGINA PECTORIS: ICD-10-CM

## 2023-10-10 DIAGNOSIS — I10 ESSENTIAL HYPERTENSION: ICD-10-CM

## 2023-10-10 DIAGNOSIS — Z12.11 COLON CANCER SCREENING: ICD-10-CM

## 2023-10-10 DIAGNOSIS — F19.11 HISTORY OF SUBSTANCE ABUSE: ICD-10-CM

## 2023-10-10 DIAGNOSIS — L30.8 OTHER ECZEMA: ICD-10-CM

## 2023-10-10 DIAGNOSIS — Z23 FLU VACCINE NEED: ICD-10-CM

## 2023-10-10 DIAGNOSIS — M54.16 LUMBAR BACK PAIN WITH RADICULOPATHY AFFECTING RIGHT LOWER EXTREMITY: Primary | ICD-10-CM

## 2023-10-10 DIAGNOSIS — J45.40 MODERATE PERSISTENT ASTHMA WITHOUT COMPLICATION: ICD-10-CM

## 2023-10-10 DIAGNOSIS — Z12.5 PROSTATE CANCER SCREENING: ICD-10-CM

## 2023-10-10 DIAGNOSIS — J90 PLEURAL EFFUSION ON LEFT: ICD-10-CM

## 2023-10-10 PROCEDURE — 3079F PR MOST RECENT DIASTOLIC BLOOD PRESSURE 80-89 MM HG: ICD-10-PCS | Mod: CPTII,,, | Performed by: FAMILY MEDICINE

## 2023-10-10 PROCEDURE — 1159F PR MEDICATION LIST DOCUMENTED IN MEDICAL RECORD: ICD-10-PCS | Mod: CPTII,,, | Performed by: FAMILY MEDICINE

## 2023-10-10 PROCEDURE — 99999 PR PBB SHADOW E&M-EST. PATIENT-LVL V: CPT | Mod: PBBFAC,,, | Performed by: FAMILY MEDICINE

## 2023-10-10 PROCEDURE — 99215 OFFICE O/P EST HI 40 MIN: CPT | Mod: S$PBB,,, | Performed by: FAMILY MEDICINE

## 2023-10-10 PROCEDURE — 1159F MED LIST DOCD IN RCRD: CPT | Mod: CPTII,,, | Performed by: FAMILY MEDICINE

## 2023-10-10 PROCEDURE — 3008F PR BODY MASS INDEX (BMI) DOCUMENTED: ICD-10-PCS | Mod: CPTII,,, | Performed by: FAMILY MEDICINE

## 2023-10-10 PROCEDURE — 3079F DIAST BP 80-89 MM HG: CPT | Mod: CPTII,,, | Performed by: FAMILY MEDICINE

## 2023-10-10 PROCEDURE — 3008F BODY MASS INDEX DOCD: CPT | Mod: CPTII,,, | Performed by: FAMILY MEDICINE

## 2023-10-10 PROCEDURE — 3075F PR MOST RECENT SYSTOLIC BLOOD PRESS GE 130-139MM HG: ICD-10-PCS | Mod: CPTII,,, | Performed by: FAMILY MEDICINE

## 2023-10-10 PROCEDURE — 71046 X-RAY EXAM CHEST 2 VIEWS: CPT | Mod: 26,,, | Performed by: RADIOLOGY

## 2023-10-10 PROCEDURE — 3044F PR MOST RECENT HEMOGLOBIN A1C LEVEL <7.0%: ICD-10-PCS | Mod: CPTII,,, | Performed by: FAMILY MEDICINE

## 2023-10-10 PROCEDURE — 99999 PR PBB SHADOW E&M-EST. PATIENT-LVL V: ICD-10-PCS | Mod: PBBFAC,,, | Performed by: FAMILY MEDICINE

## 2023-10-10 PROCEDURE — 71046 X-RAY EXAM CHEST 2 VIEWS: CPT | Mod: TC,PN

## 2023-10-10 PROCEDURE — 3044F HG A1C LEVEL LT 7.0%: CPT | Mod: CPTII,,, | Performed by: FAMILY MEDICINE

## 2023-10-10 PROCEDURE — 99215 OFFICE O/P EST HI 40 MIN: CPT | Mod: PBBFAC,25,PN | Performed by: FAMILY MEDICINE

## 2023-10-10 PROCEDURE — 99215 PR OFFICE/OUTPT VISIT, EST, LEVL V, 40-54 MIN: ICD-10-PCS | Mod: S$PBB,,, | Performed by: FAMILY MEDICINE

## 2023-10-10 PROCEDURE — 71046 XR CHEST PA AND LATERAL: ICD-10-PCS | Mod: 26,,, | Performed by: RADIOLOGY

## 2023-10-10 PROCEDURE — 3075F SYST BP GE 130 - 139MM HG: CPT | Mod: CPTII,,, | Performed by: FAMILY MEDICINE

## 2023-10-10 RX ORDER — LORATADINE 10 MG/1
10 TABLET ORAL DAILY
Qty: 90 TABLET | Refills: 3 | Status: SHIPPED | OUTPATIENT
Start: 2023-10-10 | End: 2024-10-09

## 2023-10-10 RX ORDER — IBUPROFEN 800 MG/1
800 TABLET ORAL EVERY 8 HOURS PRN
Qty: 30 TABLET | Refills: 2 | Status: SHIPPED | OUTPATIENT
Start: 2023-10-10 | End: 2023-11-09

## 2023-10-10 RX ORDER — AMLODIPINE BESYLATE 10 MG/1
10 TABLET ORAL DAILY
Qty: 90 TABLET | Refills: 3 | Status: SHIPPED | OUTPATIENT
Start: 2023-10-10 | End: 2024-10-09

## 2023-10-10 RX ORDER — TRIAMCINOLONE ACETONIDE 1 MG/G
OINTMENT TOPICAL 2 TIMES DAILY
Qty: 453.6 G | Refills: 3 | Status: SHIPPED | OUTPATIENT
Start: 2023-10-10 | End: 2024-10-09

## 2023-10-10 RX ORDER — PREDNISONE 10 MG/1
10 TABLET ORAL DAILY
Qty: 30 TABLET | Refills: 0 | Status: SHIPPED | OUTPATIENT
Start: 2023-10-10 | End: 2023-11-09

## 2023-10-10 RX ORDER — BUDESONIDE AND FORMOTEROL FUMARATE DIHYDRATE 160; 4.5 UG/1; UG/1
2 AEROSOL RESPIRATORY (INHALATION) EVERY 12 HOURS
Qty: 10.2 G | Refills: 11 | Status: SHIPPED | OUTPATIENT
Start: 2023-10-10 | End: 2024-10-09

## 2023-10-10 NOTE — TELEPHONE ENCOUNTER
I called the patient to inform him that we have faxed off the information to [email or web address]  and  [facility] will also fax the information.          ----- Message from Nelsy Gordon sent at 10/10/2023  1:41 PM CDT -----  Contact: Maureen  Type:  Patient Requesting Call    Who Called:Maureen  Regarding?:medical records  Would the patient rather a call back or a response via [employer]? Call back  Best Call Back Number:[phone]  Additional Information: pt states medical records can be requested from [facility]              Thanks  ELIO
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
## 2023-10-11 ENCOUNTER — TELEPHONE (OUTPATIENT)
Dept: PRIMARY CARE CLINIC | Facility: CLINIC | Age: 63
End: 2023-10-11
Payer: MEDICAID

## 2023-10-11 NOTE — TELEPHONE ENCOUNTER
I called the patient to inform him of his labs there was no answer.        ----- Message from Kaitlyn Monique MD sent at 10/11/2023  8:29 AM CDT -----  The left pleural effusion is resolved.

## 2023-10-13 ENCOUNTER — TELEPHONE (OUTPATIENT)
Dept: PRIMARY CARE CLINIC | Facility: CLINIC | Age: 63
End: 2023-10-13
Payer: MEDICAID

## 2023-10-16 ENCOUNTER — TELEPHONE (OUTPATIENT)
Dept: PRIMARY CARE CLINIC | Facility: CLINIC | Age: 63
End: 2023-10-16
Payer: MEDICAID

## 2023-12-04 PROBLEM — J18.9 PNEUMONIA OF BOTH LUNGS DUE TO INFECTIOUS ORGANISM: Status: RESOLVED | Noted: 2023-08-30 | Resolved: 2023-12-04

## 2023-12-04 PROBLEM — A41.9 SEPSIS: Status: RESOLVED | Noted: 2023-08-30 | Resolved: 2023-12-04

## 2024-06-21 ENCOUNTER — HOSPITAL ENCOUNTER (EMERGENCY)
Facility: HOSPITAL | Age: 64
Discharge: HOME | End: 2024-06-21
Attending: EMERGENCY MEDICINE
Payer: MEDICAID

## 2024-06-21 VITALS
HEART RATE: 72 BPM | SYSTOLIC BLOOD PRESSURE: 176 MMHG | WEIGHT: 175.94 LBS | TEMPERATURE: 98 F | HEIGHT: 69 IN | OXYGEN SATURATION: 96 % | RESPIRATION RATE: 18 BRPM | BODY MASS INDEX: 26.06 KG/M2 | DIASTOLIC BLOOD PRESSURE: 114 MMHG

## 2024-06-21 DIAGNOSIS — L02.91 ABSCESS: Primary | ICD-10-CM

## 2024-06-21 DIAGNOSIS — I10 ESSENTIAL HYPERTENSION: ICD-10-CM

## 2024-06-21 PROCEDURE — 10060 I&D ABSCESS SIMPLE/SINGLE: CPT

## 2024-06-21 PROCEDURE — 25000003 PHARM REV CODE 250: Performed by: PHYSICIAN ASSISTANT

## 2024-06-21 PROCEDURE — 99284 EMERGENCY DEPT VISIT MOD MDM: CPT | Mod: 25

## 2024-06-21 RX ORDER — LIDOCAINE HYDROCHLORIDE 20 MG/ML
5 INJECTION, SOLUTION EPIDURAL; INFILTRATION; INTRACAUDAL; PERINEURAL
Status: COMPLETED | OUTPATIENT
Start: 2024-06-21 | End: 2024-06-21

## 2024-06-21 RX ORDER — HYDROCODONE BITARTRATE AND ACETAMINOPHEN 5; 325 MG/1; MG/1
1 TABLET ORAL EVERY 4 HOURS PRN
Qty: 8 TABLET | Refills: 0 | Status: SHIPPED | OUTPATIENT
Start: 2024-06-21

## 2024-06-21 RX ORDER — AMLODIPINE BESYLATE 10 MG/1
10 TABLET ORAL DAILY
Qty: 30 TABLET | Refills: 1 | Status: SHIPPED | OUTPATIENT
Start: 2024-06-21 | End: 2025-06-21

## 2024-06-21 RX ADMIN — LIDOCAINE HYDROCHLORIDE 100 MG: 20 INJECTION, SOLUTION EPIDURAL; INFILTRATION; INTRACAUDAL at 10:06

## 2024-06-21 NOTE — ED NOTES
PHILL Jasmine notified of pt's BP and pt being out of home BP meds. Pt notified of home BP meds ordered and sent to pharmacy.

## 2024-06-21 NOTE — ED PROVIDER NOTES
History      Chief Complaint   Patient presents with    Abscess     Abscess to lt calf. Swelling to calf and ankle with tightness and hot to touch       Review of patient's allergies indicates:  No Known Allergies     HPI   HPI    6/21/2024, 10:34 AM   History obtained from the patient      History of Present Illness: Maureen Luther is a 64 y.o. male patient who presents to the Emergency Department for painful abscess to left calf.  Taking bactrim.  Denies f/n/v.    No further complaints or concerns at this time.           PCP: No, Primary Doctor       Past Medical History:  Past Medical History:   Diagnosis Date    Asthma     Facial fracture     Hepatitis C antibody positive in blood 08/30/2023    RNA NEGATIVE    Medical history non-contributory     Scrotal abscess          Past Surgical History:  Past Surgical History:   Procedure Laterality Date    None      ORBITAL RECONSTRUCTION      SCROTAL SURGERY             Family History:  Family History   Problem Relation Name Age of Onset    Hypertension Unknown             Social History:  Social History     Tobacco Use    Smoking status: Former     Current packs/day: 1.00     Types: Cigarettes    Smokeless tobacco: Not on file   Substance and Sexual Activity    Alcohol use: No    Drug use: Not Currently     Types: Marijuana    Sexual activity: Not on file       ROS   Review of Systems         Review of Systems   Constitutional:  Negative for chills and fever.   Skin:  Positive for color change.       Physical Exam      Initial Vitals [06/21/24 0942]   BP Pulse Resp Temp SpO2   (!) 167/95 83 20 98 °F (36.7 °C) 97 %      MAP       --         Physical Exam  Vital signs and nursing notes reviewed.  Constitutional: Patient is in NAD. Awake and alert. Well-developed and well-nourished.  Head: Atraumatic. Normocephalic.  Eyes:  EOM intact. Conjunctivae nl. No scleral icterus.  ENT: Mucous membranes are moist.   Neck: Supple.  No meningismus  Cardiovascular: Regular rate  "and rhythm. No murmurs, rubs, or gallops. Distal pulses are 2+ and symmetric.  Pulmonary/Chest: No respiratory distress. Clear to auscultation bilaterally. No wheezing, rales, or rhonchi.  Musculoskeletal: Moves all extremities. No edema.   Skin: Warm and dry.   3 cm of erythema with central fluctuance to left calf  Neurological: Awake and alert. No acute focal neurological deficits are appreciated.  Psychiatric: Normal affect. Good eye contact. Appropriate in content.      ED Course      I & D - Incision and Drainage    Date/Time: 6/21/2024 10:35 AM  Location procedure was performed: [facility] EMERGENCY DEPARTMENT    Performed by: Mitali La PA-C  Authorized by: Dong Padilla MD  Type: abscess  Body area: lower extremity  Location details: left leg  Anesthesia: local infiltration    Anesthesia:  Local Anesthetic: lidocaine 2% without epinephrine  Scalpel size: 11  Incision type: single straight  Incision depth: dermal  Drainage: pus  Drainage amount: moderate  Wound treatment: incision, drainage, expression of material, wound left open, wound packed and deloculation  Packing material: 1/4 in gauze  Complications: No  Patient tolerance: Patient tolerated the procedure well with no immediate complications    Incision depth: dermal        ED Vital Signs:  Vitals:    06/21/24 0942 06/21/24 1033   BP: (!) 167/95 (S) (!) 176/114   Pulse: 83 72   Resp: 20 18   Temp: 98 °F (36.7 °C) 98.2 °F (36.8 °C)   TempSrc: Oral Oral   SpO2: 97% 96%   Weight: 79.8 kg (175 lb 14.8 oz)    Height: 5' 9" (1.753 m)                  Imaging Results:  Imaging Results    None            All findings were reviewed with the patient/family in detail.   All remaining questions and concerns were addressed at that time.  Patient/family has been counseled regarding the need for follow-up as well as the indication to return to the emergency room should new or worrisome developments occur.      ED Discussion               Medication(s) given in the " ER:  Medications   LIDOcaine (PF) 20 mg/mL (2%) injection 100 mg (100 mg Intradermal Given 6/21/24 1015)            Follow-up Information       [facility] - Emergency Dept..    Specialty: Emergency Medicine  Why: If symptoms worsen  Contact information:  [address]  [facility] [address] [phone]             Your Primary Care Doctor In 2 days.                                    Medication List        START taking these medications      HYDROcodone-acetaminophen 5-325 mg per tablet  Commonly known as: NORCO  Take 1 tablet by mouth every 4 (four) hours as needed for Pain.            ASK your doctor about these medications      albuterol 90 mcg/actuation inhaler  Commonly known as: PROVENTIL/VENTOLIN HFA  Inhale 2 puffs into the lungs every 4 (four) hours as needed for Wheezing or Shortness of Breath.     amLODIPine 10 MG tablet  Commonly known as: NORVASC  Take 1 tablet (10 mg total) by mouth once daily.     aspirin 81 MG EC tablet  Commonly known as: ECOTRIN     loratadine 10 mg tablet  Commonly known as: CLARITIN  Take 1 tablet (10 mg total) by mouth once daily.     nitroGLYCERIN 0.4 MG SL tablet  Commonly known as: NITROSTAT     SYMBICORT 160-4.5 mcg/actuation Hfaa  Generic drug: budesonide-formoterol 160-4.5 mcg  Inhale 2 puffs into the lungs every 12 (twelve) hours. Controller     triamcinolone acetonide 0.1% 0.1 % ointment  Commonly known as: KENALOG  Apply topically 2 (two) times daily.               Where to Get Your Medications        These medications were sent to [id number] [place] [id number] [facility] & [address] [address]      Phone: [phone]   HYDROcodone-acetaminophen 5-325 mg per tablet             Medical Decision Making        MDM                 Clinical Impression:        ICD-10-CM ICD-9-CM   1. Abscess  L02.91 682.9              Disposition  Stable  Discharged       Mitali La,  MISA  06/21/24 1038

## 2024-06-23 ENCOUNTER — NURSE TRIAGE (OUTPATIENT)
Dept: ADMINISTRATIVE | Facility: CLINIC | Age: 64
End: 2024-06-23
Payer: MEDICAID

## 2024-06-23 NOTE — TELEPHONE ENCOUNTER
Call taken from [facility] deja Maureen reports seen in ED on 6/21/24 for spider bite to left calf. I&D. Taking antibiotic. Removed packing today as instructed. Pt c/o left foot swelling that began yesterday and worse today, new soreness to left ankle below abscess area and warm to touch. Advised pt per triage protocol to see HCP (or PCP triage) within next 4 hours at nearest UC/ED. V/u.      Reason for Disposition   [1] Treatment (antibiotic, I&D, or moist heat) > 24 hours AND [2] symptoms WORSE (e.g., pain, spreading redness, swelling)   [1] Thigh, calf, or ankle swelling AND [2] only 1 side    Additional Information   Negative: [1] Widespread rash AND [2] bright red, sunburn-like AND [3] too weak to stand   Negative: Sounds like a life-threatening emergency to the triager   Negative: [1] Widespread rash AND [2] bright red, sunburn-like   Negative: Fever > 103 F (39.4 C)   Negative: [1] Spreading redness on face AND [2] fever   Negative: Black (necrotic), dark purple, or blisters develop in area of boil (abscess)   Negative: Patient sounds very sick or weak to the triager   Negative: SEVERE pain (e.g., excruciating)   Negative: [1] Treatment (antibiotic, I&D, or moist heat) > 24 hours AND [2] new-onset red streak (or line) runs from area of infection   Negative: [1] Treatment (antibiotic, I&D, or moist heat) > 24 hours AND [2] new-onset fever   Negative: [1] Recent hospitalization AND [2] symptoms WORSE   Negative: [1] Caller has URGENT question AND [2] triager unable to answer question   Negative: SEVERE difficulty breathing (e.g., struggling for each breath, speaks in single words)   Negative: Looks like a broken bone or dislocated joint (e.g., crooked or deformed)   Negative: Sounds like a life-threatening emergency to the triager   Negative: Difficulty breathing at rest   Negative: Entire foot is cool or blue in comparison to other side   Negative: [1] Can't walk or can barely walk AND [2] new-onset   Negative: [1]  Difficulty breathing with exertion (e.g., walking) AND [2] new-onset or getting worse   Negative: [1] Red area or streak AND [2] fever   Negative: [1] Swelling is painful to touch AND [2] fever   Negative: [1] Cast on leg or ankle AND [2] now increased pain   Negative: Patient sounds very sick or weak to the triager   Negative: SEVERE leg swelling (e.g., swelling extends above knee, entire leg is swollen, weeping fluid)   Negative: [1] Red area or streak [2] large (> 2 in. or 5 cm)   Negative: [1] Thigh or calf pain AND [2] only 1 side AND [3] present > 1 hour    Protocols used: Boil (Skin Abscess) on Treatment Follow-up Call-A-, Leg Swelling and Edema-A-

## 2024-09-03 ENCOUNTER — OFFICE VISIT (OUTPATIENT)
Dept: PRIMARY CARE CLINIC | Facility: CLINIC | Age: 64
End: 2024-09-03
Payer: MEDICAID

## 2024-09-03 ENCOUNTER — LAB VISIT (OUTPATIENT)
Dept: LAB | Facility: HOSPITAL | Age: 64
End: 2024-09-03
Attending: FAMILY MEDICINE
Payer: MEDICAID

## 2024-09-03 ENCOUNTER — TELEPHONE (OUTPATIENT)
Dept: PRIMARY CARE CLINIC | Facility: CLINIC | Age: 64
End: 2024-09-03
Payer: MEDICAID

## 2024-09-03 VITALS
DIASTOLIC BLOOD PRESSURE: 96 MMHG | SYSTOLIC BLOOD PRESSURE: 160 MMHG | BODY MASS INDEX: 26.22 KG/M2 | WEIGHT: 177 LBS | OXYGEN SATURATION: 98 % | TEMPERATURE: 98 F | HEART RATE: 74 BPM | HEIGHT: 69 IN

## 2024-09-03 DIAGNOSIS — J45.40 MODERATE PERSISTENT ASTHMA WITHOUT COMPLICATION: Chronic | ICD-10-CM

## 2024-09-03 DIAGNOSIS — G89.29 CHRONIC RIGHT HIP PAIN: ICD-10-CM

## 2024-09-03 DIAGNOSIS — M25.551 CHRONIC RIGHT HIP PAIN: ICD-10-CM

## 2024-09-03 DIAGNOSIS — Z11.3 SCREEN FOR STD (SEXUALLY TRANSMITTED DISEASE): ICD-10-CM

## 2024-09-03 DIAGNOSIS — Z72.0 TOBACCO ABUSE: Chronic | ICD-10-CM

## 2024-09-03 DIAGNOSIS — Z79.899 ENCOUNTER FOR LONG-TERM CURRENT USE OF MEDICATION: ICD-10-CM

## 2024-09-03 DIAGNOSIS — Z86.19 HISTORY OF HEPATITIS C: ICD-10-CM

## 2024-09-03 DIAGNOSIS — I10 ESSENTIAL HYPERTENSION: Chronic | ICD-10-CM

## 2024-09-03 DIAGNOSIS — I25.10 CORONARY ARTERY DISEASE INVOLVING NATIVE CORONARY ARTERY OF NATIVE HEART WITHOUT ANGINA PECTORIS: ICD-10-CM

## 2024-09-03 DIAGNOSIS — F19.10 SUBSTANCE ABUSE: ICD-10-CM

## 2024-09-03 DIAGNOSIS — J45.40 MODERATE PERSISTENT ASTHMA WITHOUT COMPLICATION: ICD-10-CM

## 2024-09-03 DIAGNOSIS — L30.8 OTHER ECZEMA: ICD-10-CM

## 2024-09-03 DIAGNOSIS — I10 ESSENTIAL HYPERTENSION: Primary | Chronic | ICD-10-CM

## 2024-09-03 DIAGNOSIS — J45.20 INTERMITTENT ASTHMA WITHOUT COMPLICATION, UNSPECIFIED ASTHMA SEVERITY: ICD-10-CM

## 2024-09-03 LAB
ALBUMIN/CREAT UR: 7.4 UG/MG (ref 0–30)
AMPHET+METHAMPHET UR QL: NEGATIVE
BARBITURATES UR QL SCN>200 NG/ML: NEGATIVE
BENZODIAZ UR QL SCN>200 NG/ML: NEGATIVE
BZE UR QL SCN: ABNORMAL
CANNABINOIDS UR QL SCN: NEGATIVE
CREAT UR-MCNC: 272 MG/DL (ref 23–375)
CREAT UR-MCNC: 272 MG/DL (ref 23–375)
ETHANOL UR-MCNC: <10 MG/DL
METHADONE UR QL SCN>300 NG/ML: NEGATIVE
MICROALBUMIN UR DL<=1MG/L-MCNC: 20 UG/ML
OPIATES UR QL SCN: NEGATIVE
PCP UR QL SCN>25 NG/ML: NEGATIVE
TOXICOLOGY INFORMATION: ABNORMAL

## 2024-09-03 PROCEDURE — 3080F DIAST BP >= 90 MM HG: CPT | Mod: CPTII,,, | Performed by: FAMILY MEDICINE

## 2024-09-03 PROCEDURE — 1159F MED LIST DOCD IN RCRD: CPT | Mod: CPTII,,, | Performed by: FAMILY MEDICINE

## 2024-09-03 PROCEDURE — 80307 DRUG TEST PRSMV CHEM ANLYZR: CPT | Performed by: FAMILY MEDICINE

## 2024-09-03 PROCEDURE — 99999 PR PBB SHADOW E&M-EST. PATIENT-LVL IV: CPT | Mod: PBBFAC,,, | Performed by: FAMILY MEDICINE

## 2024-09-03 PROCEDURE — 99214 OFFICE O/P EST MOD 30 MIN: CPT | Mod: S$PBB,,, | Performed by: FAMILY MEDICINE

## 2024-09-03 PROCEDURE — 1160F RVW MEDS BY RX/DR IN RCRD: CPT | Mod: CPTII,,, | Performed by: FAMILY MEDICINE

## 2024-09-03 PROCEDURE — 87491 CHLMYD TRACH DNA AMP PROBE: CPT | Performed by: FAMILY MEDICINE

## 2024-09-03 PROCEDURE — 3008F BODY MASS INDEX DOCD: CPT | Mod: CPTII,,, | Performed by: FAMILY MEDICINE

## 2024-09-03 PROCEDURE — 99214 OFFICE O/P EST MOD 30 MIN: CPT | Mod: PBBFAC,PN | Performed by: FAMILY MEDICINE

## 2024-09-03 PROCEDURE — 82570 ASSAY OF URINE CREATININE: CPT | Performed by: FAMILY MEDICINE

## 2024-09-03 PROCEDURE — 3077F SYST BP >= 140 MM HG: CPT | Mod: CPTII,,, | Performed by: FAMILY MEDICINE

## 2024-09-03 RX ORDER — AMLODIPINE BESYLATE 10 MG/1
10 TABLET ORAL DAILY
Qty: 90 TABLET | Refills: 3 | Status: SHIPPED | OUTPATIENT
Start: 2024-09-03 | End: 2025-09-03

## 2024-09-03 RX ORDER — ASPIRIN 81 MG/1
81 TABLET ORAL DAILY
Qty: 90 TABLET | Refills: 3 | Status: SHIPPED | OUTPATIENT
Start: 2024-09-03

## 2024-09-03 RX ORDER — TRIAMCINOLONE ACETONIDE 1 MG/G
OINTMENT TOPICAL 2 TIMES DAILY
Qty: 453.6 G | Refills: 0 | Status: SHIPPED | OUTPATIENT
Start: 2024-09-03

## 2024-09-03 RX ORDER — ALBUTEROL SULFATE 90 UG/1
2 INHALANT RESPIRATORY (INHALATION) EVERY 4 HOURS PRN
Qty: 18 G | Refills: 11 | Status: SHIPPED | OUTPATIENT
Start: 2024-09-03 | End: 2025-09-03

## 2024-09-03 RX ORDER — NITROGLYCERIN 0.4 MG/1
0.4 TABLET SUBLINGUAL EVERY 5 MIN PRN
Qty: 30 TABLET | Refills: 0 | Status: SHIPPED | OUTPATIENT
Start: 2024-09-03 | End: 2024-10-03

## 2024-09-03 RX ORDER — LORATADINE 10 MG/1
10 TABLET ORAL DAILY
Qty: 90 TABLET | Refills: 3 | Status: SHIPPED | OUTPATIENT
Start: 2024-09-03 | End: 2025-09-03

## 2024-09-03 RX ORDER — BUDESONIDE AND FORMOTEROL FUMARATE DIHYDRATE 160; 4.5 UG/1; UG/1
2 AEROSOL RESPIRATORY (INHALATION) EVERY 12 HOURS
Qty: 10.2 G | Refills: 11 | Status: SHIPPED | OUTPATIENT
Start: 2024-09-03 | End: 2025-09-03

## 2024-09-03 NOTE — TELEPHONE ENCOUNTER
----- Message from Lalaterry Mar sent at 9/3/2024  3:18 PM CDT -----  Contact: [phone]  Pt states medication is not avaiable at pharmacy and is requesting it be sent to:  triamcinolone acetonide 0.1% (KENALOG) 0.1 % ointment,albuterol (PROVENTIL/VENTOLIN HFA) 90 mcg/actuation inhaler,aspirin (ECOTRIN) 81 MG EC tablet,loratadine (CLARITIN) 10 mg tablet      [id number] - [place] [address] [address] [place] [address]  Phone: [phone] Fax: [phone]    Please call pt and advise

## 2024-09-03 NOTE — PROGRESS NOTES
Subjective:      Chief Complaint   Patient presents with    Other Misc     ECZEMA all over/ med refills      Patient ID: Maureen Luther is a 64 y.o. male.  History of Present Illness    CHIEF COMPLAINT:  Maureen presents today for follow up.    DERMATOLOGICAL CONCERNS:  He presents with a skin condition characterized by eczema-like symptoms with a possibility of psoriasis. He reports itching in various locations including the inside of his leg, back of the head, and groin area. The condition is particularly bothersome in the groin area and becomes more irritating when dry. A cream helps alleviate symptoms but can be irritating. He has a history of visiting the emergency room for a lesion, which was subsequently lanced. He uses Dove soap without fragrance and an antibacterial soap for bathing. When itching becomes severe, he takes showers and applies cream all over, which provides temporary relief but can be irritating.    CARDIOVASCULAR:  His BP is elevated-he ran out on medication. He has an upcoming appointment with his cardiologist -Dr Chavarria, scheduled for the 10th of the month to address this concern.    MEDICATIONS:  He is not currently taking Claritin as he has run out. He has also run out of his blood pressure medication.    MEDICAL HISTORY:  He is due for labs this year. He had a PSA test done in October, and it is noted that insurance requires exactly one year between PSA tests.    SOCIAL HISTORY:  He reports current tobacco use. Last visit, UDS was pos for cocaine, reports that he has stopped using it.    ROS:  General: -fever, -chills, -fatigue, -weight gain, -weight loss  Eyes: -vision changes, -redness, -discharge  ENT: -ear pain, -nasal congestion, -sore throat  Cardiovascular: +chest pain, -palpitations, -lower extremity edema  Respiratory: -cough, -shortness of breath  Gastrointestinal: -abdominal pain, -nausea, -vomiting, -diarrhea, -constipation, -blood in stool  Genitourinary:  -dysuria, -hematuria, -frequency  Musculoskeletal: -joint pain, -muscle pain  Skin: +rash, -lesion, +itching  Neurological: -headache, -dizziness, -numbness, -tingling  Psychiatric: -anxiety, -depression, -sleep difficulty       Maureen Burgoss allergies, medications, history, and problem list were updated as appropriate.  Past Medical History:   Diagnosis Date    Asthma     Facial fracture     Hepatitis C antibody positive in blood 08/30/2023    RNA NEGATIVE    Medical history non-contributory     Scrotal abscess      Family History   Problem Relation Name Age of Onset    Hypertension Unknown       Social History     Socioeconomic History    Marital status:    Occupational History    Occupation: [employer]    Tobacco Use    Smoking status: Every Day     Current packs/day: 1.00     Types: Cigarettes     Passive exposure: Never   Substance and Sexual Activity    Alcohol use: No    Drug use: Not Currently     Types: Marijuana     Outpatient Encounter Medications as of 9/3/2024   Medication Sig Dispense Refill    [DISCONTINUED] aspirin (ECOTRIN) 81 MG EC tablet Take 1 tablet by mouth once daily.      [DISCONTINUED] budesonide-formoterol 160-4.5 mcg (SYMBICORT) 160-4.5 mcg/actuation HFAA Inhale 2 puffs into the lungs every 12 (twelve) hours. Controller 10.2 g 11    [DISCONTINUED] nitroGLYCERIN (NITROSTAT) 0.4 MG SL tablet Take 1 tablet by mouth.      [DISCONTINUED] triamcinolone acetonide 0.1% (KENALOG) 0.1 % ointment Apply topically 2 (two) times daily. 453.6 g 3    albuterol (PROVENTIL/VENTOLIN HFA) 90 mcg/actuation inhaler Inhale 2 puffs into the lungs every 4 (four) hours as needed for Wheezing or Shortness of Breath. 18 g 11    amLODIPine (NORVASC) 10 MG tablet Take 1 tablet (10 mg total) by mouth once daily. 90 tablet 3    aspirin (ECOTRIN) 81 MG EC tablet Take 1 tablet (81 mg total) by mouth once daily. 90 tablet 3    budesonide-formoterol 160-4.5 mcg (SYMBICORT) 160-4.5 mcg/actuation HFAA Inhale 2 puffs  "into the lungs every 12 (twelve) hours. Controller 10.2 g 11    HYDROcodone-acetaminophen (NORCO) 5-325 mg per tablet Take 1 tablet by mouth every 4 (four) hours as needed for Pain. (Patient not taking: Reported on 9/3/2024) 8 tablet 0    loratadine (CLARITIN) 10 mg tablet Take 1 tablet (10 mg total) by mouth once daily. 90 tablet 3    nitroGLYCERIN (NITROSTAT) 0.4 MG SL tablet Take 1 tablet (0.4 mg total) by mouth every 5 (five) minutes as needed for Chest pain. 30 tablet 0    triamcinolone acetonide 0.1% (KENALOG) 0.1 % ointment Apply topically 2 (two) times daily. 453.6 g 0    [DISCONTINUED] albuterol (PROVENTIL/VENTOLIN HFA) 90 mcg/actuation inhaler Inhale 2 puffs into the lungs every 4 (four) hours as needed for Wheezing or Shortness of Breath. (Patient not taking: Reported on 9/3/2024) 18 g 11    [DISCONTINUED] amLODIPine (NORVASC) 10 MG tablet Take 1 tablet (10 mg total) by mouth once daily. 90 tablet 3    [DISCONTINUED] amLODIPine (NORVASC) 10 MG tablet Take 1 tablet (10 mg total) by mouth once daily. 30 tablet 1    [DISCONTINUED] loratadine (CLARITIN) 10 mg tablet Take 1 tablet (10 mg total) by mouth once daily. (Patient not taking: Reported on 9/3/2024) 90 tablet 3     No facility-administered encounter medications on file as of 9/3/2024.          Objective:      BP (!) 160/96 (BP Location: Left arm, Patient Position: Sitting, BP Method: Medium (Manual))   Pulse 74   Temp 98.1 °F (36.7 °C)   Ht 5' 9" (1.753 m)   Wt 80.3 kg (177 lb)   SpO2 98%   BMI 26.14 kg/m²   Physical Exam    General: In no acute distress.  Head: Normocephalic. Non traumatic.  Eyes: PERRLA. EOMs full. Conjunctivae clear. Fundi grossly normal.  Ears: EACs clear. TMs normal.  Nose: Mucosa pink. Mucosa moist. No obstruction.  Throat: Clear. No exudates. No lesions.  Neck: Supple. No masses. No thyromegaly. No bruits.  Chest: Lungs clear. No rales. No rhonchi. No wheezes.  Heart: RRR. No murmurs. No rubs. No gallops.  Neuro: No focal " deficits appreciated. Good muscle tone. Normal response to visual stimuli. Normal response to auditory stimuli.  Skin: Normal. No rashes. No lesions noted. ECZEMA NOTED ON SKIN. ECZEMA PRESENT ON LEGS.        Results for orders placed or performed in visit on 10/10/23   PSA, Screening   Result Value Ref Range    PSA, Screen 2.2 0.00 - 4.00 ng/mL     Assessment/Plan:         1. Essential hypertension    2. History of hepatitis C    3. Screen for STD (sexually transmitted disease)    4. Encounter for long-term current use of medication    5. Moderate persistent asthma without complication    6. Other eczema    7. Chronic right hip pain    8. Substance abuse    9. Intermittent asthma without complication, unspecified asthma severity    10. Moderate persistent asthma without complication    11. Coronary artery disease involving native coronary artery of native heart without angina pectoris    12. Tobacco abuse      Essential hypertension  Comments:  not controlled, allow to cont same med  Orders:  -     CBC Auto Differential; Future; Expected date: 09/03/2024  -     Comprehensive Metabolic Panel; Future; Expected date: 09/03/2024  -     Microalbumin/Creatinine Ratio, Urine; Future; Expected date: 09/03/2024  -     Lipid Panel; Future; Expected date: 09/03/2024  -     amLODIPine (NORVASC) 10 MG tablet; Take 1 tablet (10 mg total) by mouth once daily.  Dispense: 90 tablet; Refill: 3    History of hepatitis C  -     Hepatitis C RNA, Quantitative, PCR; Future; Expected date: 09/03/2024    Screen for STD (sexually transmitted disease)  -     Treponema Pallidium Antibodies IgG, IgM; Future; Expected date: 09/03/2024  -     HIV 1/2 Ag/Ab (4th Gen); Future; Expected date: 09/03/2024  -     C. trachomatis/N. gonorrhoeae by AMP DNA; Future; Expected date: 09/03/2024  -     Hepatitis B Surface Antigen; Future; Expected date: 09/03/2024    Encounter for long-term current use of medication  -     TSH; Future; Expected date:  09/03/2024  -     Hemoglobin A1C; Future; Expected date: 09/03/2024    Moderate persistent asthma without complication  Comments:  controlled, no change in med  Orders:  -     budesonide-formoterol 160-4.5 mcg (SYMBICORT) 160-4.5 mcg/actuation HFAA; Inhale 2 puffs into the lungs every 12 (twelve) hours. Controller  Dispense: 10.2 g; Refill: 11  -     loratadine (CLARITIN) 10 mg tablet; Take 1 tablet (10 mg total) by mouth once daily.  Dispense: 90 tablet; Refill: 3    Other eczema  -     triamcinolone acetonide 0.1% (KENALOG) 0.1 % ointment; Apply topically 2 (two) times daily.  Dispense: 453.6 g; Refill: 0  -     Ambulatory referral/consult to Dermatology; Future; Expected date: 09/10/2024    Chronic right hip pain    Substance abuse  -     Toxicology Screen, Urine; Future; Expected date: 09/03/2024    Intermittent asthma without complication, unspecified asthma severity  -     albuterol (PROVENTIL/VENTOLIN HFA) 90 mcg/actuation inhaler; Inhale 2 puffs into the lungs every 4 (four) hours as needed for Wheezing or Shortness of Breath.  Dispense: 18 g; Refill: 11    Moderate persistent asthma without complication  Comments:  not controlled, increased dose of Symbicort, counseled to stop smoking, cont low dose prednisone, pulm referral last visit,   Orders:  -     budesonide-formoterol 160-4.5 mcg (SYMBICORT) 160-4.5 mcg/actuation HFAA; Inhale 2 puffs into the lungs every 12 (twelve) hours. Controller  Dispense: 10.2 g; Refill: 11  -     loratadine (CLARITIN) 10 mg tablet; Take 1 tablet (10 mg total) by mouth once daily.  Dispense: 90 tablet; Refill: 3    Coronary artery disease involving native coronary artery of native heart without angina pectoris  -     aspirin (ECOTRIN) 81 MG EC tablet; Take 1 tablet (81 mg total) by mouth once daily.  Dispense: 90 tablet; Refill: 3  -     nitroGLYCERIN (NITROSTAT) 0.4 MG SL tablet; Take 1 tablet (0.4 mg total) by mouth every 5 (five) minutes as needed for Chest pain.  Dispense:  30 tablet; Refill: 0    Tobacco abuse  Comments:  Advised to quit smoking      SKIN CONDITION (SUSPECTED PSORIASIS AND ECZEMA):  - Assessed skin condition.  - Considered stronger cream for treatment, noting insurance coverage limitations.  - Recognized need for dermatological evaluation and biopsy for definitive diagnosis.  - Noted patient's good use of current cream/lotion for symptom management.  - Explained potential combination of psoriasis and eczema as cause of skin symptoms.  - Maureen to continue using Dove soap without fragrance for bathing.  - Started new cream for skin condition.  - Referred to dermatology for evaluation and possible biopsy of skin condition.  HYPERTENSION:  - Identified need to restart blood pressure medication.  - Restarted blood pressure medication.  ALLERGIES:  - Identified need to restart Claritin for allergy management.  - Discussed importance of Claritin in managing allergy symptoms, which can help with skin issues.  - Restarted Claritin for allergy management.  PROSTATE HEALTH:  - Reviewed importance of annual PSA testing, noting current test is out of sequence due to insurance restrictions.  - Emphasized significance of annual PSA testing for prostate health monitoring.  - Ordered PSA test to be done in October (1 year from previous test).  LABS:  - Ordered labs.  - Maureen to sign consent for lab results to be sent directly to cardiologist's office.  FOLLOW UP:  - Follow up after dermatology appointment.  - Contact the office for lab work results if not available on [facility] before upcoming cardiology appointment on the 10th.            I have reviewed all of the patient's clinical history available in care everywhere and Epic and have utilized this in my evaluation and management recommendations today.      Treatment options and alternatives were discussed with the patient. Patient was given ample time to ask questions. All questions were answered. Voices understanding and  acceptance of this advice. Will call back if any further questions or concerns.       Portions of the record may have been created with voice recognition software. Occasional wrong-word or sound-a-like substitutions may have occurred due to the inherent limitations of voice recognition software. Read the chart carefully and recognize, using context, where substitutions have occurred.      This note was generated with the assistance of ambient listening technology. Verbal consent was obtained by the patient and accompanying visitor(s) for the recording of patient appointment to facilitate this note. I attest to having reviewed and edited the generated note for accuracy, though some syntax or spelling errors may persist. Please contact the author of this note for any clarification.            Kaitlyn Monique MD  [facility],

## 2024-09-03 NOTE — TELEPHONE ENCOUNTER
Called to inform pt medication has been sent to the [facility] pharmacy. Please call pharmacy to get medication pulled. No answer. Unknown lady on voice mail.     ----- Message from Lala Mar sent at 9/3/2024  3:18 PM CDT -----  Contact: [phone]  Pt states medication is not avaiable at pharmacy and is requesting it be sent to:  triamcinolone acetonide 0.1% (KENALOG) 0.1 % ointment,albuterol (PROVENTIL/VENTOLIN HFA) 90 mcg/actuation inhaler,aspirin (ECOTRIN) 81 MG EC tablet,loratadine (CLARITIN) 10 mg tablet      [id number]  [address] [address] [address]  Phone: [phone] Fax: [phone]    Please call pt and advise

## 2024-09-03 NOTE — PATIENT INSTRUCTIONS
DASH Diet   About this topic   DASH stands for Dietary Approaches to Stop Hypertension. The DASH diet may help you lower blood pressure. It may also help keep you from getting high blood pressure. You will eat less fat and more fiber on the DASH diet.  This diet gives you more minerals that fight high blood pressure. Some nutrients in this diet are:  Potassium ? Acts to help you get rid of salt. This may help to lower blood pressure.  Calcium ? Makes blood vessels and muscles work the right way  Magnesium - Helps blood vessels relax  Fiber ? Helps you feel full. It also helps digestion.  What will the results be?   The DASH diet may help you:  Lower your blood pressure and cholesterol  Lower your risk for cancer, heart disease, heart attack, and stroke. It may also lower your risk for heart failure, kidney stones, and diabetes.  Lose weight or keep a healthy weight  What lifestyle changes are needed?   Add regular exercise to get the most help from this diet.  Try to lower stress. Find ways to relax.  Stop smoking. Avoid secondhand smoke.  Limit alcohol intake.  What changes to diet are needed?   Know about poor eating habits. Then, you can fix them as you work with the program.  This diet encourages fruits and vegetables, whole grains, lean meats, healthy fats, and low-fat or fat-free dairy products.  This diet is lower in saturated fats, trans-fats, cholesterol, added sugars, and sodium.  Who should use this diet?   This eating plan is good for the whole family. It is also good for people with high blood pressure and those at risk for high blood pressure.  What foods are good to eat?   Grains: Try to eat 6 to 8 servings of whole grain, high fiber foods each day. These are bread, cereals, brown rice, or pasta.  Fruits and vegetables: Eat 4 to 5 servings each day. Try to pick many kinds and colors. Fresh or frozen are best. Look for low sodium or salt-free if you choose canned.  Dairy: Try to eat 2 to 3 servings of  fat free and low fat milk products each day.  Lean meats, poultry, and seafood: Try to eat 6 servings or less of lean meats, poultry, and seafood each day. Try to choose more low fat or lean meats like chicken and turkey. Eat less red meat. Eat more fish instead.  Nuts, seeds, and legumes (dry beans and peas): Try to eat 4 to 5 servings each week. Try to pick nuts such as almonds and walnuts, sunflower seeds, peanut butter, soy beans, lentils, kidney beans, and split peas.  Fats and oils: Try to eat 2 to 3 servings of fats and oils each day. Eat good fats found in fish, nuts, and avocados. Try using olive oil or vegetable oils such as canola oil. Other good oils to try are corn, safflower, sunflower, or soybean oils. Use low-sodium and low-fat salad dressing and mayonnaise.  Condiments: Pepper, herbs, spices, vinegar, lemon or lime juices are great for seasoning. Be careful to choose low-sodium or salt-free products if you use broths, soups, or soy sauce.  Sweets: Try to eat less than 5 servings each week. Choose low-fat and trans fat-free desserts. These are things like fruit flavored gelatin, sorbet, jelly beans, yasmin crackers, animal crackers, low-fat fig bars, and yves snaps. Eat fruit to satisfy your desire for sweets.     What foods should be limited or avoided?   Grains: Salted breads, rolls, crackers, quick breads, self-rising flours, biscuit mixes, regular bread crumbs      Yearly Physical for Adults   About this topic   Most people do not want to be sick. Having a checkup each year with your doctor is one way to help you stay healthy. You may need to see your doctor more or less often. How often you need to go to the doctor depends on your age. Your family and medical history also play a role in how often you need to go to the doctor. Going to see your doctor on a routine basis can help you find problems early or even before they start. This may make it easier to treat or cure your problem.  General    Your doctor will talk about many things during your checkup. Your doctor may ask about:  Your medical and family history.  All the drugs you are taking. Be sure to include all prescription, over the counter, and herbal supplements. Tell the doctor if you have any drug allergy. Bring a list of drugs you take with you.  How you are feeling and if you are having any problems.  Risky behaviors like smoking, drinking alcohol, using illegal drugs, not wearing seatbelts, having unprotected sex, etc.  Your doctor will do a physical exam and may check your:  Height and weight  Blood pressure  Reflexes  Memory  Vision  Hearing  Your doctor may order:  Lab tests  ECG to check your heart rhythm  X-rays  Tests or treatments based on your exam  What lifestyle changes are needed?   Your doctor may suggest you make changes to your lifestyle at this visit. The doctor may talk with you about being more active or lowering stress levels. Ask your doctor what you need to do.  What drugs may be needed?   Your doctor may order drugs or vaccines to protect you from illnesses.  What changes to diet are needed?   Talk to your doctor to see if any changes are needed to your diet.  When do I need to call the doctor?   Call your doctor if you need to learn about any test results. Together you can make a plan for more care.  Helpful tips   Make a list of questions for your doctor before you go. This will help you remember to ask about any concerns. Write down any answers from your doctor so you can look over them after your visit.   Tell your doctor about any changes in your body or health since your last visit.  Ask your doctor about any screening tests you need.  Where can I learn more?   American Academy of Family Physicians  http://familydoctor.org/familydoctor/en/prevention-wellness/staying-healthy/healthy-living/preventive-services-for-healthy-living.printerview.html   Centers for Disease Control  http://www.cdc.gov/family/checkup/    Last Reviewed Date   2019-04-22  Consumer Information Use and Disclaimer   This information is not specific medical advice and does not replace information you receive from your health care provider. This is only a brief summary of general information. It does NOT include all information about conditions, illnesses, injuries, tests, procedures, treatments, therapies, discharge instructions or life-style choices that may apply to you. You must talk with your health care provider for complete information about your health and treatment options. This information should not be used to decide whether or not to accept your health care providers advice, instructions or recommendations. Only your health care provider has the knowledge and training to provide advice that is right for you.  Copyright   Copyright © 2021 [employer], [employer] and its affiliates and/or licensors. All rights reserved.

## 2024-09-04 DIAGNOSIS — J45.40 MODERATE PERSISTENT ASTHMA WITHOUT COMPLICATION: ICD-10-CM

## 2024-09-04 DIAGNOSIS — I10 ESSENTIAL HYPERTENSION: Chronic | ICD-10-CM

## 2024-09-04 DIAGNOSIS — I25.10 CORONARY ARTERY DISEASE INVOLVING NATIVE CORONARY ARTERY OF NATIVE HEART WITHOUT ANGINA PECTORIS: ICD-10-CM

## 2024-09-04 DIAGNOSIS — L30.8 OTHER ECZEMA: ICD-10-CM

## 2024-09-04 LAB
C TRACH DNA SPEC QL NAA+PROBE: NOT DETECTED
N GONORRHOEA DNA SPEC QL NAA+PROBE: NOT DETECTED

## 2024-09-04 RX ORDER — AMLODIPINE BESYLATE 10 MG/1
10 TABLET ORAL DAILY
Qty: 90 TABLET | Refills: 3 | Status: CANCELLED | OUTPATIENT
Start: 2024-09-04 | End: 2025-09-04

## 2024-09-04 RX ORDER — BUDESONIDE AND FORMOTEROL FUMARATE DIHYDRATE 160; 4.5 UG/1; UG/1
2 AEROSOL RESPIRATORY (INHALATION) EVERY 12 HOURS
Qty: 10.2 G | Refills: 11 | Status: CANCELLED | OUTPATIENT
Start: 2024-09-04 | End: 2025-09-04

## 2024-09-04 RX ORDER — LORATADINE 10 MG/1
10 TABLET ORAL DAILY
Qty: 90 TABLET | Refills: 3 | Status: CANCELLED | OUTPATIENT
Start: 2024-09-04 | End: 2025-09-04

## 2024-09-04 RX ORDER — TRIAMCINOLONE ACETONIDE 1 MG/G
OINTMENT TOPICAL 2 TIMES DAILY
Qty: 453.6 G | Refills: 0 | Status: CANCELLED | OUTPATIENT
Start: 2024-09-04

## 2024-09-04 NOTE — TELEPHONE ENCOUNTER
----- Message from Monae Shine sent at 9/4/2024 10:28 AM CDT -----  Contact: Pt  [phone]  Patient is requesting the following prescriptions be sent to the pharmacy listed below.    [facility] does not have them in stock    RX triamcinolone acetonide 0.1% (KENALOG) 0.1 % ointment    RX loratadine (CLARITIN) 10 mg tablet    RX budesonide-formoterol 160-4.5 mcg (SYMBICORT) 160-4.5 mcg/actuation HFAA    RX aspirin (ECOTRIN) 81 MG EC tablet    RX  albuterol (PROVENTIL/VENTOLIN HFA) 90 mcg/actuation inhaler    [id number] [address] & [address] [address]  Phone: [phone] Fax: [phone]      Please call and advise.    Thank You   electronic

## 2024-09-04 NOTE — TELEPHONE ENCOUNTER
No care due was identified.  [facility] Embedded Care Due Messages. Reference number: [id number].   9/04/2024 3:57:16 PM CDT

## 2024-09-09 DIAGNOSIS — I10 ESSENTIAL HYPERTENSION: Chronic | ICD-10-CM

## 2024-09-09 DIAGNOSIS — L30.8 OTHER ECZEMA: ICD-10-CM

## 2024-09-09 DIAGNOSIS — J45.40 MODERATE PERSISTENT ASTHMA WITHOUT COMPLICATION: ICD-10-CM

## 2024-09-09 DIAGNOSIS — J45.20 INTERMITTENT ASTHMA WITHOUT COMPLICATION, UNSPECIFIED ASTHMA SEVERITY: ICD-10-CM

## 2024-09-09 RX ORDER — AMLODIPINE BESYLATE 10 MG/1
10 TABLET ORAL DAILY
Qty: 90 TABLET | Refills: 3 | Status: SHIPPED | OUTPATIENT
Start: 2024-09-09 | End: 2025-09-09

## 2024-09-09 RX ORDER — ASPIRIN 81 MG/1
81 TABLET ORAL DAILY
Qty: 90 TABLET | Refills: 3 | Status: SHIPPED | OUTPATIENT
Start: 2024-09-09

## 2024-09-09 RX ORDER — LORATADINE 10 MG/1
10 TABLET ORAL DAILY
Qty: 90 TABLET | Refills: 3 | Status: SHIPPED | OUTPATIENT
Start: 2024-09-09 | End: 2025-09-09

## 2024-09-09 RX ORDER — TRIAMCINOLONE ACETONIDE 1 MG/G
OINTMENT TOPICAL 2 TIMES DAILY
Qty: 453.6 G | Refills: 0 | Status: SHIPPED | OUTPATIENT
Start: 2024-09-09

## 2024-09-09 RX ORDER — BUDESONIDE AND FORMOTEROL FUMARATE DIHYDRATE 160; 4.5 UG/1; UG/1
2 AEROSOL RESPIRATORY (INHALATION) EVERY 12 HOURS
Qty: 10.2 G | Refills: 11 | Status: SHIPPED | OUTPATIENT
Start: 2024-09-09 | End: 2025-09-09

## 2024-09-09 RX ORDER — ALBUTEROL SULFATE 90 UG/1
2 INHALANT RESPIRATORY (INHALATION) EVERY 4 HOURS PRN
Qty: 18 G | Refills: 11 | Status: SHIPPED | OUTPATIENT
Start: 2024-09-09 | End: 2025-09-09

## 2024-09-12 ENCOUNTER — TELEPHONE (OUTPATIENT)
Dept: PRIMARY CARE CLINIC | Facility: CLINIC | Age: 64
End: 2024-09-12
Payer: MEDICAID

## 2024-09-13 ENCOUNTER — TELEPHONE (OUTPATIENT)
Dept: PRIMARY CARE CLINIC | Facility: CLINIC | Age: 64
End: 2024-09-13
Payer: MEDICAID

## 2024-09-19 ENCOUNTER — TELEPHONE (OUTPATIENT)
Dept: PRIMARY CARE CLINIC | Facility: CLINIC | Age: 64
End: 2024-09-19
Payer: MEDICAID

## 2024-09-19 NOTE — TELEPHONE ENCOUNTER
I have attempted without success to contact this patient by phone to discuss lab results         ----- Message from Kaitlyn Monique MD sent at 9/6/2024 12:16 PM CDT -----  I have reviewed all your lab results.   Blood count, kidney function, liver function, electrolytes, cholesterol (except for triglyceride) and thyroid function are all normal.  Your A1C is normal, therefore you do not have diabetes.  Yearly HIV, Hep B, Hep C, Syphilis screen are negative.    Maintain/Continue a heathy lifestyle.  Be more active. If you are able, walk for 35 mins 5 times a week.      Please do not hesitate to contact us if you have any questions.

## 2024-10-05 ENCOUNTER — HOSPITAL ENCOUNTER (EMERGENCY)
Facility: HOSPITAL | Age: 64
Discharge: HOME | End: 2024-10-05
Attending: FAMILY MEDICINE
Payer: MEDICAID

## 2024-10-05 VITALS
RESPIRATION RATE: 16 BRPM | OXYGEN SATURATION: 98 % | HEIGHT: 69 IN | WEIGHT: 190 LBS | TEMPERATURE: 98 F | BODY MASS INDEX: 28.14 KG/M2 | DIASTOLIC BLOOD PRESSURE: 64 MMHG | HEART RATE: 74 BPM | SYSTOLIC BLOOD PRESSURE: 104 MMHG

## 2024-10-05 DIAGNOSIS — R05.9 COUGH: ICD-10-CM

## 2024-10-05 DIAGNOSIS — U07.1 COVID-19 VIRUS DETECTED: ICD-10-CM

## 2024-10-05 DIAGNOSIS — J06.9 VIRAL URI WITH COUGH: Primary | ICD-10-CM

## 2024-10-05 LAB — SARS-COV-2 RDRP RESP QL NAA+PROBE: POSITIVE

## 2024-10-05 PROCEDURE — 99283 EMERGENCY DEPT VISIT LOW MDM: CPT | Mod: 25

## 2024-10-05 PROCEDURE — 25000003 PHARM REV CODE 250: Performed by: NURSE PRACTITIONER

## 2024-10-05 PROCEDURE — U0002 COVID-19 LAB TEST NON-CDC: HCPCS | Performed by: NURSE PRACTITIONER

## 2024-10-05 RX ORDER — PROMETHAZINE HYDROCHLORIDE AND DEXTROMETHORPHAN HYDROBROMIDE 6.25; 15 MG/5ML; MG/5ML
5 SYRUP ORAL EVERY 6 HOURS PRN
Qty: 120 ML | Refills: 0 | Status: SHIPPED | OUTPATIENT
Start: 2024-10-05 | End: 2024-10-15

## 2024-10-05 RX ORDER — ACETAMINOPHEN 500 MG
1000 TABLET ORAL
Status: COMPLETED | OUTPATIENT
Start: 2024-10-05 | End: 2024-10-05

## 2024-10-05 RX ADMIN — ACETAMINOPHEN 1000 MG: 500 TABLET ORAL at 01:10

## 2024-10-05 NOTE — Clinical Note
"Maureen"Vlad Luther was seen and treated in our emergency department on 10/5/2024.  He may return to work on 10/07/2024.       If you have any questions or concerns, please don't hesitate to call.      William Daugherty, PHILL"

## 2024-10-05 NOTE — ED PROVIDER NOTES
Encounter Date: 10/5/2024       History     Chief Complaint   Patient presents with    Cough     Pt reports cough with lower back pain radiating to his flanks x 3 days. Pt reports it feels like when he has had pneumonia. Pt reports fever last night, headache that comes and goes, and body aches. -SOB -NVD      Patient complains of several days of coughing states he is coughing so much that is ribcage hurts in his upper back hurts        Review of patient's allergies indicates:  No Known Allergies  Past Medical History:   Diagnosis Date    Asthma     Facial fracture     Hepatitis C antibody positive in blood 08/30/2023    RNA NEGATIVE    Medical history non-contributory     Scrotal abscess      Past Surgical History:   Procedure Laterality Date    None      ORBITAL RECONSTRUCTION      SCROTAL SURGERY       Family History   Problem Relation Name Age of Onset    Hypertension Unknown       Social History     Tobacco Use    Smoking status: Every Day     Current packs/day: 1.00     Types: Cigarettes     Passive exposure: Never   Substance Use Topics    Alcohol use: No    Drug use: Not Currently     Types: Marijuana     Review of Systems   Constitutional:  Negative for fever.   HENT:  Negative for sore throat.    Respiratory:  Negative for shortness of breath.    Cardiovascular:  Negative for chest pain.   Gastrointestinal:  Negative for nausea.   Genitourinary:  Negative for dysuria.   Musculoskeletal:  Negative for back pain.   Skin:  Negative for rash.   Neurological:  Negative for weakness.   Hematological:  Does not bruise/bleed easily.       Physical Exam     Initial Vitals [10/05/24 1233]   BP Pulse Resp Temp SpO2   108/76 88 18 98.5 °F (36.9 °C) 97 %      MAP       --         Physical Exam    Nursing note and vitals reviewed.  Constitutional: He appears well-developed and well-nourished. He is not diaphoretic. No distress.   HENT:   Head: Normocephalic.   Eyes: Conjunctivae and EOM are normal.   Neck:   Normal range  of motion.  Cardiovascular:  Normal rate.           Pulmonary/Chest: Breath sounds normal. No respiratory distress. He has no wheezes. He has no rhonchi. He has no rales.   Abdominal: He exhibits no distension.   Musculoskeletal:         General: Normal range of motion.      Cervical back: Normal range of motion.     Neurological: He is alert and oriented to person, place, and time. GCS score is 15. GCS eye subscore is 4. GCS verbal subscore is 5. GCS motor subscore is 6.   Skin: Skin is warm and dry. No rash noted.   Psychiatric: He has a normal mood and affect. His behavior is normal. Thought content normal.         ED Course   Procedures  Labs Reviewed   SARS-COV-2 RNA AMPLIFICATION, QUAL - Abnormal       Result Value    SARS-CoV-2 RNA, Amplification, Qual Positive (*)           Imaging Results              X-Ray Chest 1 View (Final result)  Result time 10/05/24 12:59:32      Final result by Anaid JimNiiMD quinton (10/05/24 12:59:32)                   Impression:      Negative single view chest x-ray.      Electronically signed by: Anaid Jim MD  Date:    10/05/2024  Time:    12:59               Narrative:    EXAMINATION:  XR CHEST 1 VIEW    CLINICAL HISTORY:  Cough,    COMPARISON:  Chest, 10/10/2023.    FINDINGS:  Heart size is normal. The lung fields are clear. No acute cardiopulmonary infiltrate.                                       Medications   acetaminophen tablet 1,000 mg (1,000 mg Oral Given 10/5/24 1356)     Medical Decision Making  Differential diagnosis considered but not limited to; pneumonia, bronchitis, costochondritis    Amount and/or Complexity of Data Reviewed  Radiology: ordered.    Risk  OTC drugs.  Prescription drug management.                                      Clinical Impression:  Final diagnoses:  [R05.9] Cough  [J06.9] Viral URI with cough (Primary)          ED Disposition Condition    Discharge Stable          ED Prescriptions       Medication Sig Dispense Start Date End  Date Auth. Provider    promethazine-dextromethorphan (PROMETHAZINE-DM) 6.25-15 mg/5 mL Syrp Take 5 mLs by mouth every 6 (six) hours as needed. 120 mL 10/5/2024 10/15/2024 William Daugherty NP          Follow-up Information       Follow up With Specialties Details Why Contact Info    Kaitlyn Monique MD Family Medicine Schedule an appointment as soon as possible for a visit  As needed [address]  [address]  [facility] [address]  [phone]               William Daugherty NP  10/05/24 3073

## 2024-11-01 ENCOUNTER — TELEPHONE (OUTPATIENT)
Dept: PRIMARY CARE CLINIC | Facility: CLINIC | Age: 64
End: 2024-11-01
Payer: MEDICAID

## 2024-11-06 DIAGNOSIS — L30.8 OTHER ECZEMA: ICD-10-CM

## 2024-11-06 NOTE — TELEPHONE ENCOUNTER
No care due was identified.  [facility] Embedded Care Due Messages. Reference number: [id number].   11/06/2024 3:23:38 PM CST

## 2024-11-07 RX ORDER — TRIAMCINOLONE ACETONIDE 1 MG/G
OINTMENT TOPICAL 2 TIMES DAILY
Qty: 453.6 G | Refills: 0 | Status: SHIPPED | OUTPATIENT
Start: 2024-11-07

## 2024-11-13 ENCOUNTER — TELEPHONE (OUTPATIENT)
Dept: PRIMARY CARE CLINIC | Facility: CLINIC | Age: 64
End: 2024-11-13
Payer: MEDICAID

## 2024-11-13 NOTE — TELEPHONE ENCOUNTER
Spoke with pt and was able to schedule a sooner appt. Pt voiced understanding,       ----- Message from Monae sent at 11/13/2024  2:52 PM CST -----  Contact: Pt  [phone]  Caller is requesting an earlier appointment then we can schedule.  Caller is requesting a message be sent to the provider.    If this is for urgent care symptoms, did you offer other providers at this location, providers at other locations, or [facility]? (yes, no, n/a):  na    If this is for the patients physical, did you offer to schedule next available and put on wait list, or to see NP or PA for their physical?  (yes, no, n/a): na     When is the next available appointment with their provider:  12/19/24    Reason for the appointment:  Right knee pian/barley walking on it    Patient preference of timeframe to be scheduled:  ASAP    Would the patient like a call back, or a response through their [facility] portal?:   Call back    Comments:      Please call and advise.    Thank You

## 2024-11-26 ENCOUNTER — OFFICE VISIT (OUTPATIENT)
Dept: PRIMARY CARE CLINIC | Facility: CLINIC | Age: 64
End: 2024-11-26
Payer: MEDICAID

## 2024-11-26 ENCOUNTER — HOSPITAL ENCOUNTER (OUTPATIENT)
Dept: RADIOLOGY | Facility: HOSPITAL | Age: 64
Discharge: HOME | End: 2024-11-26
Attending: FAMILY MEDICINE
Payer: MEDICAID

## 2024-11-26 VITALS
DIASTOLIC BLOOD PRESSURE: 82 MMHG | HEIGHT: 69 IN | OXYGEN SATURATION: 97 % | WEIGHT: 173.19 LBS | HEART RATE: 84 BPM | SYSTOLIC BLOOD PRESSURE: 116 MMHG | TEMPERATURE: 98 F | BODY MASS INDEX: 25.65 KG/M2

## 2024-11-26 DIAGNOSIS — M54.50 CHRONIC MIDLINE LOW BACK PAIN WITHOUT SCIATICA: Chronic | ICD-10-CM

## 2024-11-26 DIAGNOSIS — M25.561 CHRONIC PAIN OF RIGHT KNEE: ICD-10-CM

## 2024-11-26 DIAGNOSIS — F19.10 SUBSTANCE ABUSE: ICD-10-CM

## 2024-11-26 DIAGNOSIS — G89.29 CHRONIC PAIN OF RIGHT KNEE: ICD-10-CM

## 2024-11-26 DIAGNOSIS — G89.29 CHRONIC MIDLINE LOW BACK PAIN WITHOUT SCIATICA: Chronic | ICD-10-CM

## 2024-11-26 DIAGNOSIS — G89.29 CHRONIC PAIN OF RIGHT KNEE: Primary | ICD-10-CM

## 2024-11-26 DIAGNOSIS — M25.561 CHRONIC PAIN OF RIGHT KNEE: Primary | ICD-10-CM

## 2024-11-26 PROCEDURE — 3061F NEG MICROALBUMINURIA REV: CPT | Mod: CPTII,,, | Performed by: FAMILY MEDICINE

## 2024-11-26 PROCEDURE — 96372 THER/PROPH/DIAG INJ SC/IM: CPT | Mod: PBBFAC,PN

## 2024-11-26 PROCEDURE — 1159F MED LIST DOCD IN RCRD: CPT | Mod: CPTII,,, | Performed by: FAMILY MEDICINE

## 2024-11-26 PROCEDURE — 73560 X-RAY EXAM OF KNEE 1 OR 2: CPT | Mod: 26,59,LT, | Performed by: STUDENT IN AN ORGANIZED HEALTH CARE EDUCATION/TRAINING PROGRAM

## 2024-11-26 PROCEDURE — 99215 OFFICE O/P EST HI 40 MIN: CPT | Mod: PBBFAC,25,PN | Performed by: FAMILY MEDICINE

## 2024-11-26 PROCEDURE — 3079F DIAST BP 80-89 MM HG: CPT | Mod: CPTII,,, | Performed by: FAMILY MEDICINE

## 2024-11-26 PROCEDURE — 99214 OFFICE O/P EST MOD 30 MIN: CPT | Mod: S$PBB,,, | Performed by: FAMILY MEDICINE

## 2024-11-26 PROCEDURE — 99999 PR PBB SHADOW E&M-EST. PATIENT-LVL V: CPT | Mod: PBBFAC,,, | Performed by: FAMILY MEDICINE

## 2024-11-26 PROCEDURE — 73562 X-RAY EXAM OF KNEE 3: CPT | Mod: TC,PN,RT

## 2024-11-26 PROCEDURE — 3066F NEPHROPATHY DOC TX: CPT | Mod: CPTII,,, | Performed by: FAMILY MEDICINE

## 2024-11-26 PROCEDURE — 99999PBSHW PR PBB SHADOW TECHNICAL ONLY FILED TO HB: Mod: PBBFAC,,,

## 2024-11-26 PROCEDURE — 3008F BODY MASS INDEX DOCD: CPT | Mod: CPTII,,, | Performed by: FAMILY MEDICINE

## 2024-11-26 PROCEDURE — 3074F SYST BP LT 130 MM HG: CPT | Mod: CPTII,,, | Performed by: FAMILY MEDICINE

## 2024-11-26 PROCEDURE — 73562 X-RAY EXAM OF KNEE 3: CPT | Mod: 26,RT,, | Performed by: STUDENT IN AN ORGANIZED HEALTH CARE EDUCATION/TRAINING PROGRAM

## 2024-11-26 RX ORDER — NAPROXEN 500 MG/1
500 TABLET ORAL 2 TIMES DAILY PRN
Qty: 60 TABLET | Refills: 2 | Status: SHIPPED | OUTPATIENT
Start: 2024-11-26

## 2024-11-26 RX ORDER — KETOROLAC TROMETHAMINE 30 MG/ML
60 INJECTION, SOLUTION INTRAMUSCULAR; INTRAVENOUS
Status: COMPLETED | OUTPATIENT
Start: 2024-11-26 | End: 2024-11-26

## 2024-11-26 RX ADMIN — KETOROLAC TROMETHAMINE 60 MG: 60 INJECTION, SOLUTION INTRAMUSCULAR at 02:11
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
## 2024-11-29 ENCOUNTER — TELEPHONE (OUTPATIENT)
Dept: PRIMARY CARE CLINIC | Facility: CLINIC | Age: 64
End: 2024-11-29
Payer: MEDICAID

## 2024-11-29 DIAGNOSIS — E55.9 VITAMIN D DEFICIENCY: Primary | ICD-10-CM

## 2024-11-29 DIAGNOSIS — M25.561 CHRONIC PAIN OF RIGHT KNEE: ICD-10-CM

## 2024-11-29 DIAGNOSIS — G89.29 CHRONIC PAIN OF RIGHT KNEE: ICD-10-CM

## 2024-11-29 RX ORDER — ERGOCALCIFEROL 1.25 MG/1
50000 CAPSULE ORAL WEEKLY
Qty: 4 CAPSULE | Refills: 12 | Status: SHIPPED | OUTPATIENT
Start: 2024-11-29

## 2024-11-29 NOTE — PROGRESS NOTES
Abnormal xray: your xray shows a lot of arthritis.  And your bone is osteopenic. This means mild bone loss but not to the point of osteoporosis.   To avoid osteoporosis and reduce risk of fracture, I recommend using weights to exercise.  Also, please endeavor to get daily intake of calcium (1200mg ) and Take Vit D as ordered.    Referral has been sent to physical therapy for you.  It will help you long term.

## 2024-11-29 NOTE — TELEPHONE ENCOUNTER
Returned call to pt in regards to injection. Pt doesn't have any transportation. Pt advised to get in contact with Medicaid transportation services.    ----- Message from Jenifer sent at 11/29/2024  8:57 AM CST -----  Contact: [phone]  [email or web address]     Patient is calling for Medical Advice regarding:appt for the shot in his knee     How long has patient had these symptoms:had no transportation for this morning     Pharmacy name and phone#:    Patient wants a call back or thru [facility]:call back     Comments:  He states he is needing another appt for this I do not see an appt for today please advise   Please advise patient replies from provider may take up to 48 hours.

## 2024-12-02 ENCOUNTER — CLINICAL SUPPORT (OUTPATIENT)
Dept: REHABILITATION | Facility: HOSPITAL | Age: 64
End: 2024-12-02
Attending: FAMILY MEDICINE
Payer: MEDICAID

## 2024-12-02 ENCOUNTER — TELEPHONE (OUTPATIENT)
Dept: PRIMARY CARE CLINIC | Facility: CLINIC | Age: 64
End: 2024-12-02
Payer: MEDICAID

## 2024-12-02 DIAGNOSIS — M54.50 CHRONIC MIDLINE LOW BACK PAIN WITHOUT SCIATICA: Chronic | ICD-10-CM

## 2024-12-02 DIAGNOSIS — R52 PAIN: Primary | ICD-10-CM

## 2024-12-02 DIAGNOSIS — R53.1 WEAKNESS: ICD-10-CM

## 2024-12-02 DIAGNOSIS — R26.9 GAIT DIFFICULTY: ICD-10-CM

## 2024-12-02 DIAGNOSIS — G89.29 CHRONIC PAIN OF RIGHT KNEE: ICD-10-CM

## 2024-12-02 DIAGNOSIS — G89.29 CHRONIC MIDLINE LOW BACK PAIN WITHOUT SCIATICA: Chronic | ICD-10-CM

## 2024-12-02 DIAGNOSIS — M25.561 CHRONIC PAIN OF RIGHT KNEE: ICD-10-CM

## 2024-12-02 PROCEDURE — 97110 THERAPEUTIC EXERCISES: CPT | Mod: PN

## 2024-12-02 PROCEDURE — 97161 PT EVAL LOW COMPLEX 20 MIN: CPT | Mod: PN
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
## 2024-12-02 NOTE — TELEPHONE ENCOUNTER
Called to inform pt of labs, Pt voiced understanding.     ----- Message from Kaitlyn Monique MD sent at 11/29/2024  1:02 PM CST -----  Abnormal xray: your xray shows a lot of arthritis.  And your bone is osteopenic. This means mild bone loss but not to the point of osteoporosis.   To avoid osteoporosis and reduce risk of fracture, I recommend using weights to exercise.  Also, please endeavor to get daily intake of calcium (1200mg ) and Take Vit D as ordered.    Referral has been sent to physical therapy for you.  It will help you long term.

## 2024-12-04 ENCOUNTER — OFFICE VISIT (OUTPATIENT)
Dept: PRIMARY CARE CLINIC | Facility: CLINIC | Age: 64
End: 2024-12-04
Payer: MEDICAID

## 2024-12-04 DIAGNOSIS — G89.29 CHRONIC PAIN OF RIGHT KNEE: Primary | ICD-10-CM

## 2024-12-04 DIAGNOSIS — M85.861 OSTEOPENIA OF RIGHT LOWER LEG: Chronic | ICD-10-CM

## 2024-12-04 DIAGNOSIS — M25.561 CHRONIC PAIN OF RIGHT KNEE: Primary | ICD-10-CM

## 2024-12-04 PROCEDURE — 99999PBSHW PR PBB SHADOW TECHNICAL ONLY FILED TO HB: Mod: PBBFAC,,,

## 2024-12-04 PROCEDURE — 99211 OFF/OP EST MAY X REQ PHY/QHP: CPT | Mod: PBBFAC,PN | Performed by: FAMILY MEDICINE

## 2024-12-04 PROCEDURE — 20610 DRAIN/INJ JOINT/BURSA W/O US: CPT | Mod: PBBFAC,PN | Performed by: FAMILY MEDICINE

## 2024-12-04 PROCEDURE — 99999 PR PBB SHADOW E&M-EST. PATIENT-LVL I: CPT | Mod: PBBFAC,,, | Performed by: FAMILY MEDICINE

## 2024-12-04 RX ADMIN — TRIAMCINOLONE ACETONIDE 40 MG: 40 INJECTION, SUSPENSION INTRA-ARTICULAR; INTRAMUSCULAR at 11:12

## 2024-12-05 RX ORDER — TRIAMCINOLONE ACETONIDE 40 MG/ML
40 INJECTION, SUSPENSION INTRA-ARTICULAR; INTRAMUSCULAR
Status: DISCONTINUED | OUTPATIENT
Start: 2024-12-04 | End: 2024-12-05 | Stop reason: HOSPADM

## 2024-12-06 NOTE — PROGRESS NOTES
[facility]   Physical Therapy Treatment Note      Name: Maureen Santiago Rindge  Clinic Number: [id number]    Therapy Diagnosis:   Encounter Diagnoses   Name Primary?    Pain Yes    Weakness     Gait difficulty      Physician: Kaitlyn Monique MD    Visit Date: 12/9/2024    Physician Orders: PT Eval and Treat   Medical Diagnosis from Referral: Chronic pain of right knee  Chronic midline low back pain without sciatica  Evaluation Date: 12/2/2024  Authorization Period Expiration: 11/27/2026  Plan of Care Expiration: 2/7/2025  Progress Note Due: 30 days  Visit # / Visits authorized: 1/ 20 + eval  FOTO: 1/3  Precautions: Standard, Standard    PTA Visit #: 0/5     Time In: 8:32  Time Out: 9:33  Total Billable Time: 60 minutes not including MH time    Subjective     Pt reports: he had injection to R knee on 12/5/24 with only 1 day relief. He reports he is hurting a lot today.   He was compliant with home exercise program.  Response to previous treatment: no issues  Functional change: n/a at this time    Pain: 10/10  Location: bilateral low venkat      Objective      Objective Measures updated at progress report unless specified.     Treatment     Maureen received the following treatments:    Maureen received the following manual therapy techniques applied for (0) minutes, including:      Maureen received therapeutic exercises to develop strength, endurance, ROM, flexibility, posture, and core stabilization for (60)  minutes including:  Nustep for ROM, strength, and endurance x 8 minutes  Incline gastroc stretch 3 x 30 secs   Ball in and outs 2 x 10 reps  Lumbar decompression positioning discussed for pain management  LTR over the ball 1 x 10 reps/ patient reports R knee pain  Supine pelvic tilts not able to perform due to R knee pain complaints  Seated pelvic tilts 2 x 10 reps  Long sitting hamstring stretch 3 x 30 secs  Ball squeezes 1 x 20 reps  -Shuttle with core engaged 4 bands DL with more WB through L LE  due to R knee pain 3 minutes  - standing TKE's with ball R knee 2 x 10 reps with 5 sec hold  - tailgaitors 3# 3 minutes    MH to low back x 10 minutes in sitting    Patient Education and Home Exercises       Education provided:   - HEP reviewed    Written Home Exercises Provided: Patient instructed to cont prior HEP. Exercises were reviewed and Maureen was able to demonstrate them prior to the end of the session.  Maureen demonstrated good  understanding of the education provided. See EMR under Patient Instructions for exercises provided during therapy sessions    Assessment     First visit after initial evaluation. Patient presents to clinic with increase pain. He tolerated new exercises with difficulty.  Pain is a limiting factor. Patient reported he he felt better after the session. PT will progress patient as tolerated.     Maureen Is progressing well towards his goals.   Pt prognosis is Good.     Pt will continue to benefit from skilled outpatient physical therapy to address the deficits listed in the problem list box on initial evaluation, provide pt/family education and to maximize pt's level of independence in the home and community environment.     Pt's spiritual, cultural and educational needs considered and pt agreeable to plan of care and goals.     Anticipated barriers to physical therapy: pain, chronicity of condition     Goals: STG's 2 weeks  Patient will be independent with 50% of HEP Progressing     LTG's 10 weeks  Patient will improve FOTO functional measure score  to 67 % in order to improve overall QOL & return to PLOF   2. Patient will report an overall decrease in pain with ADL's and functional mobility  3. Patient will increase strength by at least 1/2 muscle grade in affected musculature to improve functional mobility  4. Patient will improve ROM to improve functional mobility and ADL's  5. Patient will be more aware of posture throughout the day to reduce stress  and maintain optimal alignment of  the spine  6. Patient will be independent with HEP and pain management  7. Patient will demonstrate improved strength and endurance and perform 8 sit to stands in 30 secs with UE support  8. Patient will improve TUG score and perform in 20 secs or less    Plan     Plan of care Certification: 12/2/2024 to 2/7/2025.     Outpatient Physical Therapy 2 times weekly for 10 weeks to include the following interventions: Cervical/Lumbar Traction, Electrical Stimulation PRN, Gait Training, Manual Therapy, Moist Heat/ Ice, Neuromuscular Re-ed, Patient Education, Self Care, Therapeutic Activities, Therapeutic Exercise, and Ultrasound, ASTYM, Kinesiotaping PRN, Functional Dry Needling      Mary Cook, PT

## 2024-12-06 NOTE — PROCEDURES
Large Joint Aspiration/Injection: R knee    Date/Time: 12/4/2024 11:00 AM    Performed by: Kaitlyn Monique MD  Authorized by: Kaitlyn Monique MD    Consent Done?:  Yes (Written)  Indications:  Pain  Site marked: the procedure site was marked    Timeout: prior to procedure the correct patient, procedure, and site was verified      Local anesthesia used?: Yes    Local anesthetic:  Lidocaine spray    Details:  Needle Size:  22 G  Ultrasonic Guidance for needle placement?: No    Approach:  Anterolateral  Location:  Knee  Site:  R knee  Medications:  40 mg triamcinolone acetonide 40 mg/mL  Patient tolerance:  Patient tolerated the procedure well with no immediate complications     Rest, Ice, and Compression for 24 hours.  Watch for any redness, swelling, or pain as potential signs of infections.  Return to clinic if any fever or suspicion of infection  Gradual return to normal activity.

## 2024-12-06 NOTE — PROGRESS NOTES
Subjective:      Chief Complaint   Patient presents with    Knee Pain      Patient ID: Maureen Luther is a 64 y.o. male.  History of Present Illness          Chronic right knee medial.  Xray was done on 11/29/24 and Abnormal xray: your xray shows a lot of arthritis.  And your bone is osteopenic. This means mild bone loss but not to the point of osteoporosis.  To avoid osteoporosis and reduce risk of fracture, I recommend using weights to exercise.    Also, please endeavor to get daily intake of calcium (1200mg ) and Take Vit D as ordered.   Referral has been sent to physical therapy for pt at the time of the read  Maureen Luther's allergies, medications, history, and problem list were updated as appropriate.  Past Medical History:   Diagnosis Date    Asthma     Facial fracture     Hepatitis C antibody positive in blood 08/30/2023    RNA NEGATIVE    Medical history non-contributory     Scrotal abscess      Family History   Problem Relation Name Age of Onset    Hypertension Unknown       Social History     Socioeconomic History    Marital status:    Occupational History    Occupation: [email or web address]    Tobacco Use    Smoking status: Every Day     Current packs/day: 1.00     Types: Cigarettes     Passive exposure: Never   Substance and Sexual Activity    Alcohol use: No    Drug use: Not Currently     Types: Marijuana     Outpatient Encounter Medications as of 12/4/2024   Medication Sig Dispense Refill    albuterol (PROVENTIL/VENTOLIN HFA) 90 mcg/actuation inhaler Inhale 2 puffs into the lungs every 4 (four) hours as needed for Wheezing or Shortness of Breath. (Patient not taking: Reported on 11/26/2024) 18 g 11    amLODIPine (NORVASC) 10 MG tablet Take 1 tablet (10 mg total) by mouth once daily. 90 tablet 3    aspirin (ECOTRIN) 81 MG EC tablet Take 1 tablet (81 mg total) by mouth once daily. 90 tablet 3    budesonide-formoterol 160-4.5 mcg (SYMBICORT) 160-4.5 mcg/actuation HFAA Inhale 2 puffs into the lungs every 12  (twelve) hours. Controller 10.2 g 11    ergocalciferol (ERGOCALCIFEROL) 50,000 unit Cap Take 1 capsule (50,000 Units total) by mouth once a week. 4 capsule 12    HYDROcodone-acetaminophen (NORCO) 5-325 mg per tablet Take 1 tablet by mouth every 4 (four) hours as needed for Pain. (Patient not taking: Reported on 9/3/2024) 8 tablet 0    loratadine (CLARITIN) 10 mg tablet Take 1 tablet (10 mg total) by mouth once daily. 90 tablet 3    naproxen (NAPROSYN) 500 MG tablet Take 1 tablet (500 mg total) by mouth 2 (two) times daily as needed (pain). 60 tablet 2    nitroGLYCERIN (NITROSTAT) 0.4 MG SL tablet Take 1 tablet (0.4 mg total) by mouth every 5 (five) minutes as needed for Chest pain. 30 tablet 0    triamcinolone acetonide 0.1% (KENALOG) 0.1 % ointment Apply topically 2 (two) times daily. 453.6 g 0     No facility-administered encounter medications on file as of 12/4/2024.          Objective:      There were no vitals taken for this visit.  Physical Exam  Musculoskeletal:      Right knee: Crepitus present. Decreased range of motion. Tenderness (medial, below the joint line,) present. No patellar tendon tenderness.      Instability Tests: Anterior drawer test negative. Posterior drawer test negative.        Legs:       Physical Exam             Results for orders placed or performed during the hospital encounter of 10/05/24   COVID-19 Rapid Screening    Collection Time: 10/05/24  1:30 PM   Result Value Ref Range    SARS-CoV-2 RNA, Amplification, Qual Positive (A) Negative     Assessment/Plan:         1. Chronic pain of right knee    2. Osteopenia of right lower leg      Chronic pain of right knee  Comments:  due to pesanserine bursitis, here for an injection.    Osteopenia of right lower leg  Comments:  per xray on 11/28/24, Vit D ordered for pt.                   I have reviewed all of the patient's clinical history available in care everywhere and Epic and have utilized this in my evaluation and management  recommendations today.      Treatment options and alternatives were discussed with the patient. Patient was given ample time to ask questions. All questions were answered. Voices understanding and acceptance of this advice. Will call back if any further questions or concerns.                   Kaitlyn Monique MD  [facility],

## 2024-12-09 ENCOUNTER — CLINICAL SUPPORT (OUTPATIENT)
Dept: REHABILITATION | Facility: HOSPITAL | Age: 64
End: 2024-12-09
Payer: MEDICAID

## 2024-12-09 ENCOUNTER — TELEPHONE (OUTPATIENT)
Dept: PRIMARY CARE CLINIC | Facility: CLINIC | Age: 64
End: 2024-12-09
Payer: MEDICAID

## 2024-12-09 DIAGNOSIS — R26.9 GAIT DIFFICULTY: ICD-10-CM

## 2024-12-09 DIAGNOSIS — R52 PAIN: Primary | ICD-10-CM

## 2024-12-09 DIAGNOSIS — R53.1 WEAKNESS: ICD-10-CM

## 2024-12-09 PROCEDURE — 97110 THERAPEUTIC EXERCISES: CPT | Mod: PN

## 2024-12-09 NOTE — TELEPHONE ENCOUNTER
Returned call to pt. Pt advised to follow up with physical therapy. Pt also recommenced to follow up with prescribed medication for Vit D as well as otc calcium supplements. Pt voiced understanding.

## 2024-12-12 ENCOUNTER — TELEPHONE (OUTPATIENT)
Dept: REHABILITATION | Facility: HOSPITAL | Age: 64
End: 2024-12-12
Payer: MEDICAID

## 2024-12-17 ENCOUNTER — CLINICAL SUPPORT (OUTPATIENT)
Dept: REHABILITATION | Facility: HOSPITAL | Age: 64
End: 2024-12-17
Payer: MEDICAID

## 2024-12-17 DIAGNOSIS — R26.9 GAIT DIFFICULTY: ICD-10-CM

## 2024-12-17 DIAGNOSIS — R53.1 WEAKNESS: ICD-10-CM

## 2024-12-17 DIAGNOSIS — R52 PAIN: Primary | ICD-10-CM

## 2024-12-17 PROCEDURE — 97110 THERAPEUTIC EXERCISES: CPT | Mod: PN

## 2024-12-17 NOTE — PROGRESS NOTES
[facility]   Physical Therapy Treatment Note      Name: Maureen Santiago [facility] Number: [id number]    Therapy Diagnosis:   Encounter Diagnoses   Name Primary?    Pain Yes    Weakness     Gait difficulty      Physician: Kaitlyn Monique MD    Visit Date: 12/17/2024    Physician Orders: PT Eval and Treat   Medical Diagnosis from Referral: Chronic pain of right knee  Chronic midline low back pain without sciatica  Evaluation Date: 12/2/2024  Authorization Period Expiration: 11/27/2026  Plan of Care Expiration: 2/7/2025  Progress Note Due: 30 days  Visit # / Visits authorized: 2/ 20 + eval  FOTO: 1/3  Precautions: Standard    PTA Visit #: 0/5     Time In: 10:03  Time Out: 11:00  Total Billable Time: 57 minutes not including MH time    Subjective     Pt reports: he had injection to R knee on 12/5/24 and knee is feeling better. Patient reports increase pain in low back.   He was compliant with home exercise program.  Response to previous treatment: no issues  Functional change: n/a at this time    Pain: 9/10  Location: bilateral low venkat      Objective      Objective Measures updated at progress report unless specified.     Treatment     Maureen received the following treatments:    Maureen received the following manual therapy techniques applied for (0) minutes, including:      Maureen received therapeutic exercises to develop strength, endurance, ROM, flexibility, posture, and core stabilization for 57 minutes including:  Nustep for ROM, strength, and endurance x 10 minutes  Long sitting hamstring stretch 3 x 30 secs  Pushing ball out seated to stretch low back 3 x in each direction holding for 10 secs  Sitting rotation with 4# ball 2 x 10 reps stopping at midline  Wall slides with a  10 sec hold 1 x 10 reps  Incline gastroc stretch 3 x 30 secs   -Shuttle with core engaged 8 bands DL  3 minutes  - HEP reviewed with patient and the importance of daily stretching  Matrix core lumbar 100# 2 x 10  reps      deferred  Ball in and outs 2 x 10 reps  Lumbar decompression positioning discussed for pain management  LTR over the ball 1 x 10 reps/ patient reports R knee pain  Supine pelvic tilts not able to perform due to R knee pain complaints  Seated pelvic tilts 2 x 10 reps  Ball squeezes 1 x 20 reps  - standing TKE's with ball R knee 2 x 10 reps with 5 sec hold  - tailgaitors 3# 3 minutes    MH to low back x 10 minutes in sitting    Patient Education and Home Exercises       Education provided:   - HEP reviewed and importance of daily stretching and pacing oneself    Written Home Exercises Provided: Patient instructed to cont prior HEP. Exercises were reviewed and Maureen was able to demonstrate them prior to the end of the session.  Maureen demonstrated good  understanding of the education provided. See EMR under Patient Instructions for exercises provided during therapy sessions    Assessment   Patient presents with severe complaints of back pain and no pan in R knee today.   He tolerated treatment with difficulty. He was able to progress with resistance on shuttle today and progress with core activities.  HEP reviewed and importance of daily stretching and pacing oneself.  PT will progress patient as tolerated.         Maureen Is progressing well towards his goals.   Pt prognosis is Good.     Pt will continue to benefit from skilled outpatient physical therapy to address the deficits listed in the problem list box on initial evaluation, provide pt/family education and to maximize pt's level of independence in the home and community environment.     Pt's spiritual, cultural and educational needs considered and pt agreeable to plan of care and goals.     Anticipated barriers to physical therapy: pain, chronicity of condition     Goals: STG's 2 weeks  Patient will be independent with 50% of HEP Progressing     LTG's 10 weeks  Patient will improve FOTO functional measure score  to 67 % in order to improve overall QOL &  return to PLOF   2. Patient will report an overall decrease in pain with ADL's and functional mobility  3. Patient will increase strength by at least 1/2 muscle grade in affected musculature to improve functional mobility  4. Patient will improve ROM to improve functional mobility and ADL's  5. Patient will be more aware of posture throughout the day to reduce stress  and maintain optimal alignment of the spine  6. Patient will be independent with HEP and pain management  7. Patient will demonstrate improved strength and endurance and perform 8 sit to stands in 30 secs with UE support  8. Patient will improve TUG score and perform in 20 secs or less    Plan     Plan of care Certification: 12/2/2024 to 2/7/2025.     Outpatient Physical Therapy 2 times weekly for 10 weeks to include the following interventions: Cervical/Lumbar Traction, Electrical Stimulation PRN, Gait Training, Manual Therapy, Moist Heat/ Ice, Neuromuscular Re-ed, Patient Education, Self Care, Therapeutic Activities, Therapeutic Exercise, and Ultrasound, ASTYM, Kinesiotaping PRN, Functional Dry Needling      Mary Cook, PT

## 2024-12-19 ENCOUNTER — CLINICAL SUPPORT (OUTPATIENT)
Dept: REHABILITATION | Facility: HOSPITAL | Age: 64
End: 2024-12-19
Payer: MEDICAID

## 2024-12-19 DIAGNOSIS — R52 PAIN: Primary | ICD-10-CM

## 2024-12-19 DIAGNOSIS — R26.9 GAIT DIFFICULTY: ICD-10-CM

## 2024-12-19 DIAGNOSIS — R53.1 WEAKNESS: ICD-10-CM

## 2024-12-19 PROCEDURE — 97110 THERAPEUTIC EXERCISES: CPT | Mod: PN

## 2024-12-19 NOTE — PROGRESS NOTES
[facility]   Physical Therapy Treatment Note      Name: Maureen Santiago Ashkan  Clinic Number: [id number]    Therapy Diagnosis:   Encounter Diagnoses   Name Primary?    Pain Yes    Weakness     Gait difficulty      Physician: Kaitlyn Monique MD    Visit Date: 12/19/2024    Physician Orders: PT Eval and Treat   Medical Diagnosis from Referral: Chronic pain of right knee  Chronic midline low back pain without sciatica  Evaluation Date: 12/2/2024  Authorization Period Expiration: 11/27/2026  Plan of Care Expiration: 2/7/2025  Progress Note Due: 30 days  Visit # / Visits authorized:3/ 20 + eval  FOTO: 1/3  Precautions: Standard    PTA Visit #: 0/5     Time In: 10:09  Time Out: 11:09  Total Billable Time: 60 minutes not including MH time    Subjective     Pt reports: he over did last visit. Patient reports he is taking a road trip that will take about 3 days and leaving this weekend. Patient reports his back tightens up at home after therapy sessions.   He was compliant with home exercise program.  Response to previous treatment: no issues  Functional change: n/a at this time    Pain: 9/10  Location: bilateral low back     Objective      Objective Measures updated at progress report unless specified.     Treatment     Maureen received the following treatments:    Maureen received the following manual therapy techniques applied for (0) minutes, including:      Maureen received therapeutic exercises to develop strength, endurance, ROM, flexibility, posture, and core stabilization for 60 minutes including:  Nustep for ROM, strength, and endurance x 10 minutes  Long sitting hamstring stretch 3 x 30 secs  Pushing ball out seated to stretch low back 3 x in each direction holding for 10 secs  Sitting rotation with 4# ball 2 x 10 reps stopping at midline  Wall slides with a  10 sec hold 1 x 10 reps  Incline gastroc stretch 3 x 30 secs   -Shuttle with core engaged 6 bands DL  3 minutes  Matrix core lumbar 80# 2 x  10 reps      deferred  Ball in and outs 2 x 10 reps  Lumbar decompression positioning discussed for pain management  LTR over the ball 1 x 10 reps/ patient reports R knee pain  Supine pelvic tilts not able to perform due to R knee pain complaints  Seated pelvic tilts 2 x 10 reps  Ball squeezes 1 x 20 reps  - standing TKE's with ball R knee 2 x 10 reps with 5 sec hold  - tailgaitors 3# 3 minutes    MH to low back x 10 minutes in sitting    Patient Education and Home Exercises       Education provided:   - HEP reviewed and importance of daily stretching and pacing oneself    Written Home Exercises Provided: Patient instructed to cont prior HEP. Exercises were reviewed and Maureen was able to demonstrate them prior to the end of the session.  Maureen demonstrated good  understanding of the education provided. See EMR under Patient Instructions for exercises provided during therapy sessions    Assessment   Patient presents with severe complaints of back pain. Resistance decreased today due to pain complaints and patient reports he pushed himself too much last session. He tolerated treatment well and is learning how to engage core musculature.   PT will progress patient as tolerated.         Maureen Is progressing well towards his goals.   Pt prognosis is Good.     Pt will continue to benefit from skilled outpatient physical therapy to address the deficits listed in the problem list box on initial evaluation, provide pt/family education and to maximize pt's level of independence in the home and community environment.     Pt's spiritual, cultural and educational needs considered and pt agreeable to plan of care and goals.     Anticipated barriers to physical therapy: pain, chronicity of condition     Goals: STG's 2 weeks  Patient will be independent with 50% of HEP Progressing     LTG's 10 weeks  Patient will improve FOTO functional measure score  to 67 % in order to improve overall QOL & return to PLOF   2. Patient will report  an overall decrease in pain with ADL's and functional mobility  3. Patient will increase strength by at least 1/2 muscle grade in affected musculature to improve functional mobility  4. Patient will improve ROM to improve functional mobility and ADL's  5. Patient will be more aware of posture throughout the day to reduce stress  and maintain optimal alignment of the spine  6. Patient will be independent with HEP and pain management  7. Patient will demonstrate improved strength and endurance and perform 8 sit to stands in 30 secs with UE support  8. Patient will improve TUG score and perform in 20 secs or less    Plan     Plan of care Certification: 12/2/2024 to 2/7/2025.     Outpatient Physical Therapy 2 times weekly for 10 weeks to include the following interventions: Cervical/Lumbar Traction, Electrical Stimulation PRN, Gait Training, Manual Therapy, Moist Heat/ Ice, Neuromuscular Re-ed, Patient Education, Self Care, Therapeutic Activities, Therapeutic Exercise, and Ultrasound, ASTYM, Kinesiotaping PRN, Functional Dry Needling      Mary Cook, PT

## 2025-01-10 DIAGNOSIS — L30.8 OTHER ECZEMA: ICD-10-CM

## 2025-01-10 RX ORDER — TRIAMCINOLONE ACETONIDE 1 MG/G
OINTMENT TOPICAL 2 TIMES DAILY
Qty: 453.6 G | Refills: 0 | Status: CANCELLED | OUTPATIENT
Start: 2025-01-10

## 2025-01-10 NOTE — TELEPHONE ENCOUNTER
Care Due:                  Date            Visit Type   Department     Provider  --------------------------------------------------------------------------------                                ESTABLISHED   [facility] PR[facility]  Last Visit: 12-      PATIENT      CARE           Kaitlyn Monique  Next Visit: None Scheduled  None         None Found                                                            Last  Test          Frequency    Reason                     Performed    Due Date  --------------------------------------------------------------------------------    Vitamin D...  12 months..  ergocalciferol...........  Not Found    Overdue    [employer] Embedded Care Due Messages. Reference number: [id number].   1/10/2025 2:50:49 PM CST

## 2025-01-10 NOTE — TELEPHONE ENCOUNTER
----- Message from Chata sent at 1/10/2025 12:25 PM CST -----  Contact: Pt [phone]  Requesting an RX refill or new RX.    Is this a refill or new RX: refill    RX name and strength (copy/paste from chart):  triamcinolone acetonide 0.1% (KENALOG) 0.1 % ointment    Is this a 30 day or 90 day RX: 453.6 g    Pharmacy name and phone # (copy/paste from chart):    [facility] #[id number] - [address] & [address] [address]  Phone: [phone] Fax: [phone]        Please Call and advise    Thank you    Please do NOT rep[ly to sender as this is from the call center and they answer incoming calls only.

## 2025-01-17 DIAGNOSIS — I25.10 CORONARY ARTERY DISEASE INVOLVING NATIVE CORONARY ARTERY OF NATIVE HEART WITHOUT ANGINA PECTORIS: ICD-10-CM

## 2025-01-17 RX ORDER — NITROGLYCERIN 0.4 MG/1
0.4 TABLET SUBLINGUAL EVERY 5 MIN PRN
Qty: 30 TABLET | Refills: 0 | Status: SHIPPED | OUTPATIENT
Start: 2025-01-17 | End: 2025-02-16

## 2025-01-17 NOTE — TELEPHONE ENCOUNTER
----- Message from Ansley sent at 2025  2:16 PM CST -----  Regardinnd message  Contact: [phone]  Requesting an RX refill or new RX.     Is this a refill or new RX: refill  1     RX name and strength (copy/paste from chart):  triamcinolone acetonide 0.1% (KENALOG) 0.1 % ointment     Is this a 30 day or 90 day RX: 453.6 g     Pharmacy name and phone # (copy/paste from chart):    [facility] #[id number] - [address] & [address] [address]  Phone: [phone] Fax: [phone]           Please Call and advise     Thank you

## 2025-01-17 NOTE — TELEPHONE ENCOUNTER
No care due was identified.  [facility] Embedded Care Due Messages. Reference number: [id number].   1/17/2025 3:10:58 PM CST

## 2025-01-18 DIAGNOSIS — L30.8 OTHER ECZEMA: ICD-10-CM

## 2025-01-18 NOTE — TELEPHONE ENCOUNTER
No care due was identified.  [facility] Embedded Care Due Messages. Reference number: [id number].   1/18/2025 2:40:55 PM CST

## 2025-01-20 RX ORDER — TRIAMCINOLONE ACETONIDE 1 MG/G
1 OINTMENT TOPICAL 2 TIMES DAILY
Qty: 454 G | Refills: 2 | Status: SHIPPED | OUTPATIENT
Start: 2025-01-20

## 2025-01-20 NOTE — TELEPHONE ENCOUNTER
Refill Decision Note   Maureen Ashkan  is requesting a refill authorization.  Brief Assessment and Rationale for Refill:  Approve     Medication Therapy Plan: Pt appears to use 454g every 2 months      Comments:     Note composed:4:28 PM 01/20/2025

## 2025-01-23 DIAGNOSIS — I25.10 CORONARY ARTERY DISEASE INVOLVING NATIVE CORONARY ARTERY OF NATIVE HEART WITHOUT ANGINA PECTORIS: ICD-10-CM

## 2025-01-23 RX ORDER — NITROGLYCERIN 0.4 MG/1
TABLET SUBLINGUAL
Qty: 25 TABLET | OUTPATIENT
Start: 2025-01-23

## 2025-01-23 NOTE — TELEPHONE ENCOUNTER
No care due was identified.  [facility] Embedded Care Due Messages. Reference number: [id number].   1/23/2025 3:55:28 AM CST

## 2025-01-23 NOTE — TELEPHONE ENCOUNTER
Refill Decision Note   Maureen Luther  is requesting a refill authorization.  Brief Assessment and Rationale for Refill:  Quick Discontinue     Medication Therapy Plan:         Comments:     Note composed:7:13 AM 01/23/2025

## 2025-03-07 DIAGNOSIS — J45.40 MODERATE PERSISTENT ASTHMA WITHOUT COMPLICATION: ICD-10-CM

## 2025-03-07 DIAGNOSIS — L30.8 OTHER ECZEMA: ICD-10-CM

## 2025-03-07 NOTE — TELEPHONE ENCOUNTER
No care due was identified.  Health [facility] Embedded Care Due Messages. Reference number: [id number].   3/07/2025 9:30:58 AM CST

## 2025-03-12 ENCOUNTER — OFFICE VISIT (OUTPATIENT)
Dept: PRIMARY CARE CLINIC | Facility: CLINIC | Age: 65
End: 2025-03-12
Payer: MEDICAID

## 2025-03-12 VITALS
TEMPERATURE: 98 F | HEIGHT: 69 IN | OXYGEN SATURATION: 98 % | DIASTOLIC BLOOD PRESSURE: 72 MMHG | WEIGHT: 171.81 LBS | BODY MASS INDEX: 25.45 KG/M2 | HEART RATE: 74 BPM | SYSTOLIC BLOOD PRESSURE: 112 MMHG

## 2025-03-12 DIAGNOSIS — I10 HYPERTENSION, UNSPECIFIED TYPE: ICD-10-CM

## 2025-03-12 DIAGNOSIS — F17.200 NEEDS SMOKING CESSATION EDUCATION: ICD-10-CM

## 2025-03-12 DIAGNOSIS — M54.50 CHRONIC BILATERAL LOW BACK PAIN WITHOUT SCIATICA: ICD-10-CM

## 2025-03-12 DIAGNOSIS — G89.29 CHRONIC MIDLINE LOW BACK PAIN WITHOUT SCIATICA: Primary | ICD-10-CM

## 2025-03-12 DIAGNOSIS — R21 RASH AND NONSPECIFIC SKIN ERUPTION: ICD-10-CM

## 2025-03-12 DIAGNOSIS — J45.40 MODERATE PERSISTENT ASTHMA WITHOUT COMPLICATION: ICD-10-CM

## 2025-03-12 DIAGNOSIS — G89.29 CHRONIC BILATERAL LOW BACK PAIN WITHOUT SCIATICA: ICD-10-CM

## 2025-03-12 DIAGNOSIS — M54.50 CHRONIC MIDLINE LOW BACK PAIN WITHOUT SCIATICA: Primary | ICD-10-CM

## 2025-03-12 PROCEDURE — 99215 OFFICE O/P EST HI 40 MIN: CPT | Mod: PBBFAC,PN | Performed by: NURSE PRACTITIONER

## 2025-03-12 PROCEDURE — 1160F RVW MEDS BY RX/DR IN RCRD: CPT | Mod: CPTII,,, | Performed by: NURSE PRACTITIONER

## 2025-03-12 PROCEDURE — 3074F SYST BP LT 130 MM HG: CPT | Mod: CPTII,,, | Performed by: NURSE PRACTITIONER

## 2025-03-12 PROCEDURE — 1159F MED LIST DOCD IN RCRD: CPT | Mod: CPTII,,, | Performed by: NURSE PRACTITIONER

## 2025-03-12 PROCEDURE — 99214 OFFICE O/P EST MOD 30 MIN: CPT | Mod: S$PBB,,, | Performed by: NURSE PRACTITIONER

## 2025-03-12 PROCEDURE — 3078F DIAST BP <80 MM HG: CPT | Mod: CPTII,,, | Performed by: NURSE PRACTITIONER

## 2025-03-12 PROCEDURE — 99999 PR PBB SHADOW E&M-EST. PATIENT-LVL V: CPT | Mod: PBBFAC,,, | Performed by: NURSE PRACTITIONER

## 2025-03-12 PROCEDURE — 3008F BODY MASS INDEX DOCD: CPT | Mod: CPTII,,, | Performed by: NURSE PRACTITIONER

## 2025-03-12 RX ORDER — TRIAMCINOLONE ACETONIDE 1 MG/G
1 OINTMENT TOPICAL 2 TIMES DAILY
Qty: 454 G | Refills: 0 | Status: SHIPPED | OUTPATIENT
Start: 2025-03-12

## 2025-03-12 RX ORDER — BUDESONIDE AND FORMOTEROL FUMARATE DIHYDRATE 160; 4.5 UG/1; UG/1
2 AEROSOL RESPIRATORY (INHALATION) EVERY 12 HOURS
Qty: 10.2 G | Refills: 11 | Status: SHIPPED | OUTPATIENT
Start: 2025-03-12 | End: 2026-03-12

## 2025-03-12 RX ORDER — KETOCONAZOLE 20 MG/G
CREAM TOPICAL 2 TIMES DAILY
Qty: 60 G | Refills: 2 | Status: SHIPPED | OUTPATIENT
Start: 2025-03-12 | End: 2025-03-26

## 2025-03-18 ENCOUNTER — TELEPHONE (OUTPATIENT)
Dept: PRIMARY CARE CLINIC | Facility: CLINIC | Age: 65
End: 2025-03-18
Payer: MEDICAID

## 2025-03-18 NOTE — TELEPHONE ENCOUNTER
Called to inform pt that medication has been sent to the pharmacy. Please follow up with pharmacy in regards to medication. Pt voiced understanding.     ----- Message from Dorys sent at 3/18/2025 10:16 AM CDT -----  Contact: Mobile  [phone]  Type: Rx Clarification/ Additional Information/ QuestionsMedication: New, creamWhat questions do you have about the medication, if any? Status of the orderWhat information is needed? StatusPharmacy number: [facility] #[id number] - [place] - [address] [facility] & [address] [address]Kvgqr: [phone] Fax: [phone]: Patient came in to see you on 03/12, and he said that you were suppose to send a New script for a Anti Fugus Cream to ..[facility] #[id number]  [place] - [address] [address] [facility] & [address] [address], but the script was never sent in.  
246

## 2025-03-31 NOTE — PROGRESS NOTES
"Subjective:       Patient ID: Maureen Luther is a 64 y.o. male.    Chief Complaint: Follow-up      History of Present Illness:   Maureen Luther 64 y.o. male presents today for follow up of  hypertension asthma and rash of skin. He states he has been taking medication as prescribed but recently ran out of his inhaler. He admits occasional wheezing. He is complaining of ongoing rash on his skin. He has been having rash that he states is spreading. Rash is currently located on bilateral upper extremities chest and face. He has been taking prescribed steroid cream triamcinolone and has been having minimal relief. He is also complaining of chronic back pain. He is requesting ortho referral for options due to increased pain.     Past Medical History:   Diagnosis Date    Asthma     Facial fracture     Hepatitis C antibody positive in blood 08/30/2023    RNA NEGATIVE    Medical history non-contributory     Scrotal abscess      Family History   Problem Relation Name Age of Onset    Hypertension Unknown       Social History[1]  Encounter Medications[2]    Review of Systems   Constitutional:  Negative for fatigue and fever.   HENT:  Negative for congestion.    Respiratory:  Positive for wheezing.    Gastrointestinal:  Negative for abdominal distention, constipation, diarrhea and nausea.   Genitourinary:  Negative for dysuria and hematuria.   Musculoskeletal:  Negative for back pain.   Skin:  Positive for color change and rash.   Neurological:  Negative for dizziness and weakness.   Psychiatric/Behavioral:  Negative for agitation.      Objective:      /72   Pulse 74   Temp 98.1 °F (36.7 °C) (Oral)   Ht 5' 9" (1.753 m)   Wt 77.9 kg (171 lb 12.8 oz)   SpO2 98%   BMI 25.37 kg/m²   Physical Exam    Results for orders placed or performed during the hospital encounter of 10/05/24   COVID-19 Rapid Screening    Collection Time: 10/05/24  1:30 PM   Result Value Ref Range    SARS-CoV-2 RNA, Amplification, Qual Positive " (A) Negative     Assessment:       1. Chronic midline low back pain without sciatica    2. Chronic bilateral low back pain without sciatica    3. Rash and nonspecific skin eruption    4. Hypertension, unspecified type    5. Moderate persistent asthma without complication        Plan:   Chronic midline low back pain without sciatica  -     Ambulatory referral/consult to Orthopedics; Future; Expected date: 03/19/2025    Chronic bilateral low back pain without sciatica    Rash and nonspecific skin eruption  -     ketoconazole (NIZORAL) 2 % cream; Apply topically 2 (two) times daily. for 14 days  Dispense: 60 g; Refill: 2  -     Ambulatory referral/consult to Dermatology; Future; Expected date: 03/19/2025    Hypertension, unspecified type  -     Comprehensive Metabolic Panel; Future; Expected date: 03/12/2025  -     CBC W/ AUTO DIFFERENTIAL; Future; Expected date: 03/12/2025  -     LIPID PANEL; Future; Expected date: 03/12/2025    Moderate persistent asthma without complication  Comments:  not controlled, increased dose of Symbicort, counseled to stop smoking, cont low dose prednisone, pulm referral last visit,   Orders:  -     budesonide-formoterol 160-4.5 mcg (SYMBICORT) 160-4.5 mcg/actuation HFAA; Inhale 2 puffs into the lungs every 12 (twelve) hours. Controller  Dispense: 10.2 g; Refill: 11             [facility] [address]  [place] [address]  Office [phone]  Fax [phone]          [1]   Social History  Socioeconomic History    Marital status:    Occupational History    Occupation: [email or web address]    Tobacco Use    Smoking status: Every Day     Current packs/day: 1.00     Types: Cigarettes     Passive exposure: Never   Substance and Sexual Activity    Alcohol use: No    Drug use: Not Currently     Types: Marijuana   [2]   Outpatient Encounter Medications as of 3/12/2025   Medication Sig Dispense Refill    albuterol (PROVENTIL/VENTOLIN HFA) 90  mcg/actuation inhaler Inhale 2 puffs into the lungs every 4 (four) hours as needed for Wheezing or Shortness of Breath. 18 g 11    amLODIPine (NORVASC) 10 MG tablet Take 1 tablet (10 mg total) by mouth once daily. 90 tablet 3    aspirin (ECOTRIN) 81 MG EC tablet Take 1 tablet (81 mg total) by mouth once daily. 90 tablet 3    ergocalciferol (ERGOCALCIFEROL) 50,000 unit Cap Take 1 capsule (50,000 Units total) by mouth once a week. 4 capsule 12    HYDROcodone-acetaminophen (NORCO) 5-325 mg per tablet Take 1 tablet by mouth every 4 (four) hours as needed for Pain. 8 tablet 0    loratadine (CLARITIN) 10 mg tablet Take 1 tablet (10 mg total) by mouth once daily. 90 tablet 3    naproxen (NAPROSYN) 500 MG tablet Take 1 tablet (500 mg total) by mouth 2 (two) times daily as needed (pain). 60 tablet 2    nitroGLYCERIN (NITROSTAT) 0.4 MG SL tablet Take 1 tablet (0.4 mg total) by mouth every 5 (five) minutes as needed for Chest pain. 30 tablet 0    [DISCONTINUED] budesonide-formoterol 160-4.5 mcg (SYMBICORT) 160-4.5 mcg/actuation HFAA Inhale 2 puffs into the lungs every 12 (twelve) hours. Controller 10.2 g 11    [DISCONTINUED] triamcinolone acetonide 0.1% (KENALOG) 0.1 % ointment APPLY TOPICALLY TO THE AFFECTED AREA TWICE DAILY 454 g 2    budesonide-formoterol 160-4.5 mcg (SYMBICORT) 160-4.5 mcg/actuation HFAA Inhale 2 puffs into the lungs every 12 (twelve) hours. Controller 10.2 g 11    ketoconazole (NIZORAL) 2 % cream Apply topically 2 (two) times daily. for 14 days 60 g 2     No facility-administered encounter medications on file as of 3/12/2025.

## 2025-06-16 ENCOUNTER — OFFICE VISIT (OUTPATIENT)
Dept: PRIMARY CARE CLINIC | Facility: CLINIC | Age: 65
End: 2025-06-16
Payer: MEDICAID

## 2025-06-16 ENCOUNTER — PATIENT OUTREACH (OUTPATIENT)
Dept: ADMINISTRATIVE | Facility: OTHER | Age: 65
End: 2025-06-16
Payer: MEDICAID

## 2025-06-16 ENCOUNTER — LAB VISIT (OUTPATIENT)
Dept: LAB | Facility: HOSPITAL | Age: 65
End: 2025-06-16
Attending: FAMILY MEDICINE
Payer: MEDICAID

## 2025-06-16 DIAGNOSIS — F14.11 HISTORY OF COCAINE ABUSE: ICD-10-CM

## 2025-06-16 DIAGNOSIS — I10 ESSENTIAL HYPERTENSION: Chronic | ICD-10-CM

## 2025-06-16 DIAGNOSIS — Z12.11 COLON CANCER SCREENING: ICD-10-CM

## 2025-06-16 DIAGNOSIS — H01.134 ECZEMA OF LEFT UPPER EYELID: ICD-10-CM

## 2025-06-16 DIAGNOSIS — Z11.3 SCREEN FOR STD (SEXUALLY TRANSMITTED DISEASE): ICD-10-CM

## 2025-06-16 DIAGNOSIS — Z12.5 PROSTATE CANCER SCREENING: ICD-10-CM

## 2025-06-16 DIAGNOSIS — R52 ACUTE PAIN: Chronic | ICD-10-CM

## 2025-06-16 DIAGNOSIS — M54.16 LUMBAR BACK PAIN WITH RADICULOPATHY AFFECTING RIGHT LOWER EXTREMITY: Primary | Chronic | ICD-10-CM

## 2025-06-16 DIAGNOSIS — L30.9 SEVERE ECZEMA: Chronic | ICD-10-CM

## 2025-06-16 PROCEDURE — 87389 HIV-1 AG W/HIV-1&-2 AB AG IA: CPT

## 2025-06-16 PROCEDURE — 99999PBSHW PR PBB SHADOW TECHNICAL ONLY FILED TO HB: Mod: JZ,PBBFAC,,

## 2025-06-16 PROCEDURE — 80307 DRUG TEST PRSMV CHEM ANLYZR: CPT

## 2025-06-16 PROCEDURE — 36415 COLL VENOUS BLD VENIPUNCTURE: CPT | Mod: PN

## 2025-06-16 PROCEDURE — 99999 PR PBB SHADOW E&M-EST. PATIENT-LVL IV: CPT | Mod: PBBFAC,,, | Performed by: FAMILY MEDICINE

## 2025-06-16 PROCEDURE — 96372 THER/PROPH/DIAG INJ SC/IM: CPT | Mod: PBBFAC,PN

## 2025-06-16 PROCEDURE — 99215 OFFICE O/P EST HI 40 MIN: CPT | Mod: S$PBB,,, | Performed by: FAMILY MEDICINE

## 2025-06-16 PROCEDURE — 86593 SYPHILIS TEST NON-TREP QUANT: CPT

## 2025-06-16 PROCEDURE — 1159F MED LIST DOCD IN RCRD: CPT | Mod: CPTII,,, | Performed by: FAMILY MEDICINE

## 2025-06-16 PROCEDURE — 86803 HEPATITIS C AB TEST: CPT

## 2025-06-16 PROCEDURE — 82570 ASSAY OF URINE CREATININE: CPT

## 2025-06-16 PROCEDURE — 99214 OFFICE O/P EST MOD 30 MIN: CPT | Mod: PBBFAC,PN | Performed by: FAMILY MEDICINE

## 2025-06-16 RX ORDER — KETOROLAC TROMETHAMINE 10 MG/1
10 TABLET, FILM COATED ORAL EVERY 6 HOURS
Qty: 20 TABLET | Refills: 0 | Status: SHIPPED | OUTPATIENT
Start: 2025-06-16 | End: 2025-06-21

## 2025-06-16 RX ORDER — KETOROLAC TROMETHAMINE 30 MG/ML
30 INJECTION, SOLUTION INTRAMUSCULAR; INTRAVENOUS
Status: COMPLETED | OUTPATIENT
Start: 2025-06-16 | End: 2025-06-16

## 2025-06-16 RX ORDER — KETOROLAC TROMETHAMINE 15 MG/ML
30 INJECTION, SOLUTION INTRAMUSCULAR; INTRAVENOUS
Status: DISCONTINUED | OUTPATIENT
Start: 2025-06-16 | End: 2025-06-16

## 2025-06-16 RX ORDER — TRIAMCINOLONE ACETONIDE 40 MG/ML
40 INJECTION, SUSPENSION INTRA-ARTICULAR; INTRAMUSCULAR
Status: DISCONTINUED | OUTPATIENT
Start: 2025-06-16 | End: 2025-06-16

## 2025-06-16 RX ORDER — NABUMETONE 500 MG/1
500 TABLET, FILM COATED ORAL 2 TIMES DAILY PRN
Qty: 60 TABLET | Refills: 0 | Status: SHIPPED | OUTPATIENT
Start: 2025-06-16

## 2025-06-16 RX ORDER — DICLOFENAC SODIUM 10 MG/G
2 GEL TOPICAL 4 TIMES DAILY PRN
Qty: 100 G | Refills: 4 | Status: SHIPPED | OUTPATIENT
Start: 2025-06-16 | End: 2025-06-26

## 2025-06-16 RX ORDER — PIMECROLIMUS 10 MG/G
CREAM TOPICAL 2 TIMES DAILY
Qty: 100 G | Refills: 0 | Status: SHIPPED | OUTPATIENT
Start: 2025-06-16

## 2025-06-16 RX ORDER — BETAMETHASONE DIPROPIONATE 0.5 MG/G
OINTMENT TOPICAL 2 TIMES DAILY
Qty: 45 G | Refills: 11 | Status: SHIPPED | OUTPATIENT
Start: 2025-06-16

## 2025-06-16 RX ADMIN — KETOROLAC TROMETHAMINE 30 MG: 30 INJECTION, SOLUTION INTRAMUSCULAR; INTRAVENOUS at 11:06

## 2025-06-16 NOTE — PROGRESS NOTES
CHW - Initial Contact    This Community Health Worker completed the Social Determinant of Health questionnaire with patient during clinic visit today.    Pt identified barriers of most importance are food however patient do get SNAP. Patient will get a ready made food box.   Referrals to community agencies completed with patient consent outside of [facility] include. Ready made food box was given tot his patient.  Referrals were put through [facility] - no  Support and Services:  None  Other information discussed the patient needs help with. No other information was discussed.   Follow up required: No  No future outreach task assigned

## 2025-06-16 NOTE — PROGRESS NOTES
Subjective:           Chief Complaint: Other Misc (Low back pain (2 yrs)/ skin rash all over for 1 year/ left heel pain (2 months))      History of Present Illness:   Maureen Luther 65 y.o. male presents today with Other Misc (Low back pain (2 yrs)/ skin rash all over for 1 year/ left heel pain (2 months))    Maureen Luther's allergies, medications, history, and problem list were updated as appropriate.   History of Present Illness    CHIEF COMPLAINT:  Maureen presents today for skin rash and back pain    DERMATOLOGIC:  He presents with an eczematous rash on the eyelids, back of neck, and left arm with severe itching. The rash is spreading and follows a pattern of clearing up in some areas while appearing in others, with varying durations of involvement in different locations.    MUSCULOSKELETAL:  He reports severe back pain affecting activities of daily living, including difficulty tying shoes. He has a history of two ruptured discs from an accident two years ago. Previous physical therapy was discontinued due to lack of improvement. Current Tramadol is ineffective for pain management.    MEDICAL HISTORY:  History of atrial fibrillation with pacemaker placement. Previous knee condition resolved completely following injection treatment.      ROS:  General: -fever, -chills, -fatigue, -weight gain, -weight loss  Eyes: -vision changes, -redness, -discharge  ENT: -ear pain, -nasal congestion, -sore throat  Cardiovascular: -chest pain, -palpitations, -lower extremity edema  Respiratory: -cough, -shortness of breath  Gastrointestinal: -abdominal pain, -nausea, -vomiting, -diarrhea, -constipation, -blood in stool  Genitourinary: -dysuria, -hematuria, -frequency  Musculoskeletal: -joint pain, -muscle pain, +back pain, +pain with movement, +limb pain  Skin: +rash, +lesion, +itching  Neurological: -headache, -dizziness, -numbness, -tingling  Psychiatric: -anxiety, -depression, -sleep difficulty        Past Medical History:    Diagnosis Date    Asthma     Facial fracture     Hepatitis C antibody positive in blood 08/30/2023    RNA NEGATIVE    Medical history non-contributory     Scrotal abscess      Family History   Problem Relation Name Age of Onset    Hypertension Unknown       Social History[1]  Encounter Medications[2]      3/12/2025     1:47 PM   Depression Patient Health Questionnaire   Over the last two weeks how often have you been bothered by little interest or pleasure in doing things Not at all   Over the last two weeks how often have you been bothered by feeling down, depressed or hopeless Not at all   PHQ-2 Total Score 0       Objective:      There were no vitals taken for this visit.  Physical Exam    Musculoskeletal: Tenderness to palpation bilaterally in back. Unable to lay flat for straight leg raise test.  Skin: Eczematous rash on left arm.        Physical Exam  Vitals and nursing note reviewed.   Constitutional:       General: He is not in acute distress.     Appearance: He is well-developed.   HENT:      Head: Normocephalic and atraumatic.      Right Ear: External ear normal.      Left Ear: External ear normal.   Eyes:      Conjunctiva/sclera: Conjunctivae normal.   Neck:      Thyroid: No thyromegaly.   Cardiovascular:      Rate and Rhythm: Normal rate and regular rhythm.      Pulses: Normal pulses.      Heart sounds: Normal heart sounds. No murmur heard.  Pulmonary:      Effort: Pulmonary effort is normal. No respiratory distress.      Breath sounds: Normal breath sounds.   Genitourinary:     Comments: deferred  Musculoskeletal:         General: No deformity.      Cervical back: Neck supple.      Lumbar back: Spasms and tenderness present. Decreased range of motion.        Back:       Comments: Unable to lay down for exam   Lymphadenopathy:      Head:      Right side of head: No submandibular adenopathy.      Left side of head: No submandibular adenopathy.      Cervical: No cervical adenopathy.   Skin:     General:  Skin is warm and dry.      Findings: Rash (elbows, left upper eye lid and upper back cw eczema) present. Rash is scaling.   Neurological:      Mental Status: He is alert and oriented to person, place, and time.   Psychiatric:         Behavior: Behavior normal.         Results for orders placed or performed during the hospital encounter of 10/05/24   COVID-19 Rapid Screening    Collection Time: 10/05/24  1:30 PM   Result Value Ref Range    SARS-CoV-2 RNA, Amplification, Qual Positive (A) Negative     Assessment:       1. Lumbar back pain with radiculopathy affecting right lower extremity    2. Essential hypertension    3. History of cocaine abuse    4. Screen for STD (sexually transmitted disease)    5. Colon cancer screening    6. Acute pain    7. Eczema of left upper eyelid    8. Severe eczema    9. Prostate cancer screening        Plan:   Assessment & Plan      Assessed skin condition as significant eczema, noting spread to multiple areas including eyelids, upper back and elbows.  Evaluated chronic back pain, likely due to previously diagnosed ruptured discs.  Considered history of AFib and pacemaker in treatment decisions.  Determined need for stronger topical treatment for eczema   Reviewed renal function from September, noted as good.    Plan MRI to further evaluate back condition, contingent on insurance approval.  Considered interventional pain management options, including nerve burning, as potential future treatments.       1. Lumbar back pain with radiculopathy affecting right lower extremity  Comments:  acute on ch, conservative theraoy, MRI for further eval  Orders:  -     MRI Lumbar Spine Without Contrast; Future; Expected date: 06/16/2025  -     ketorolac (TORADOL) 10 mg tablet; Take 1 tablet (10 mg total) by mouth every 6 (six) hours. for 5 days  Dispense: 20 tablet; Refill: 0  -     diclofenac sodium (VOLTAREN) 1 % Gel; Apply 2 g topically 4 (four) times daily as needed (pain).  Dispense: 100 g;  Refill: 4  -     nabumetone (RELAFEN) 500 MG tablet; Take 1 tablet (500 mg total) by mouth 2 (two) times daily as needed for Pain (pain).  Dispense: 60 tablet; Refill: 0    2. Essential hypertension  Comments:  controlled, no change in medication  Orders:  -     Microalbumin/Creatinine Ratio, Urine; Future; Expected date: 12/16/2025    3. History of cocaine abuse  Comments:  Slight Agitation and rhinorhea noted,will screen for substance and avoid giving med with potential for sedation  Orders:  -     Toxicology Screen, Urine; Future; Expected date: 06/16/2025    4. Screen for STD (sexually transmitted disease)  -     Treponema Pallidium Antibodies IgG, IgM; Future; Expected date: 06/16/2025  -     HIV 1/2 Ag/Ab (4th Gen); Future; Expected date: 06/16/2025  -     Hepatitis C Antibody; Future; Expected date: 06/16/2025    5. Colon cancer screening  -     Fecal Immunochemical Test (iFOBT); Future; Expected date: 06/16/2025    6. Acute pain  Comments:  acuteon chronic back pain, will give toradol injection and cont oral for 5 days. GFR and sCr are normal  Orders:  -     Discontinue: triamcinolone acetonide injection 40 mg  -     ketorolac injection 30 mg  -  7. Eczema of left upper eyelid  -     pimecrolimus (ELIDEL) 1 % cream; Apply topically 2 (two) times daily.  Dispense: 100 g; Refill: 0    8. Severe eczema  Comments:  acute on ch, see new medication  Orders:  -     betamethasone dipropionate (BETANATE) 0.05 % ointment; Apply topically 2 (two) times daily.  Dispense: 45 g; Refill: 11    9. Prostate cancer screening  -     PSA, Screening; Future; Expected date: 06/16/2025        I have reviewed all of the patient's clinical history available in care everywhere and Epic and have utilized this in my evaluation and management recommendations today.      Treatment options and alternatives were discussed with the patient. Patient was given ample time to ask questions. All questions were answered. Voices understanding and  acceptance of this advice. Will call back if any further questions or concerns.         I spent a total of 60 minutes face to face and non-face to face on the date of this visit.This includes time preparing to see the patient (eg, review of tests, notes), obtaining and/or reviewing additional history from an independent historian and/or outside medical records, documenting clinical information in the electronic health record, independently interpreting results and/or communicating results to the patient/family/caregiver, or care coordinator.  Visit today included increased complexity associated with the care of the episodic problem addressed and managing the longitudinal care of the patient due to the serious and/or complex managed problem(s).     Kaitlyn Monique MD, DABOM [facility],         We serve, heal, lead, educate and innovate.              [1]   Social History  Socioeconomic History    Marital status:    Occupational History    Occupation: [employer]    Tobacco Use    Smoking status: Every Day     Current packs/day: 1.00     Types: Cigarettes     Passive exposure: Never   Substance and Sexual Activity    Alcohol use: No    Drug use: Not Currently     Types: Marijuana     Social Drivers of Health     Financial Resource Strain: Low Risk  (6/16/2025)    Overall Financial Resource Strain (CARDIA)     Difficulty of Paying Living Expenses: Not hard at all   Food Insecurity: Food Insecurity Present (6/16/2025)    Hunger Vital Sign     Worried About Running Out of Food in the Last Year: Sometimes true     Ran Out of Food in the Last Year: Sometimes true   Transportation Needs: No Transportation Needs (6/16/2025)    PRAPARE - Transportation     Lack of Transportation (Medical): No     Lack of Transportation (Non-Medical): No   Physical Activity: Inactive (6/16/2025)    Exercise Vital Sign     Days of Exercise per Week: 0 days     Minutes of Exercise per Session: 0 min   Stress: No  Stress Concern Present (6/16/2025)    [employer] [place] [employer] - Occupational Stress Questionnaire     Feeling of Stress : Only a little   Housing Stability: Low Risk  (6/16/2025)    Housing Stability Vital Sign     Unable to Pay for Housing in the Last Year: No     Number of Times Moved in the Last Year: 0     Homeless in the Last Year: No   [2]   Outpatient Encounter Medications as of 6/16/2025   Medication Sig Dispense Refill    albuterol (PROVENTIL/VENTOLIN HFA) 90 mcg/actuation inhaler Inhale 2 puffs into the lungs every 4 (four) hours as needed for Wheezing or Shortness of Breath. 18 g 11    amLODIPine (NORVASC) 10 MG tablet Take 1 tablet (10 mg total) by mouth once daily. 90 tablet 3    aspirin (ECOTRIN) 81 MG EC tablet Take 1 tablet (81 mg total) by mouth once daily. 90 tablet 3    budesonide-formoterol 160-4.5 mcg (SYMBICORT) 160-4.5 mcg/actuation HFAA Inhale 2 puffs into the lungs every 12 (twelve) hours. Controller 10.2 g 11    budesonide-formoterol 160-4.5 mcg (SYMBICORT) 160-4.5 mcg/actuation HFAA Inhale 2 puffs into the lungs every 12 (twelve) hours. Controller 10.2 g 11    ergocalciferol (ERGOCALCIFEROL) 50,000 unit Cap Take 1 capsule (50,000 Units total) by mouth once a week. 4 capsule 12    HYDROcodone-acetaminophen (NORCO) 5-325 mg per tablet Take 1 tablet by mouth every 4 (four) hours as needed for Pain. 8 tablet 0    loratadine (CLARITIN) 10 mg tablet Take 1 tablet (10 mg total) by mouth once daily. 90 tablet 3    triamcinolone acetonide 0.1% (KENALOG) 0.1 % ointment Apply 1 application  topically 2 (two) times daily. Apply to affected area 454 g 0    [DISCONTINUED] naproxen (NAPROSYN) 500 MG tablet Take 1 tablet (500 mg total) by mouth 2 (two) times daily as needed (pain). 60 tablet 2    betamethasone dipropionate (BETANATE) 0.05 % ointment Apply topically 2 (two) times daily. 45 g 11    diclofenac sodium (VOLTAREN) 1 % Gel Apply 2 g topically 4 (four) times daily as needed  (pain). 100 g 4    ketoconazole (NIZORAL) 2 % cream Apply topically 2 (two) times daily. for 14 days 60 g 2    ketorolac (TORADOL) 10 mg tablet Take 1 tablet (10 mg total) by mouth every 6 (six) hours. for 5 days 20 tablet 0    nabumetone (RELAFEN) 500 MG tablet Take 1 tablet (500 mg total) by mouth 2 (two) times daily as needed for Pain (pain). 60 tablet 0    nitroGLYCERIN (NITROSTAT) 0.4 MG SL tablet Take 1 tablet (0.4 mg total) by mouth every 5 (five) minutes as needed for Chest pain. 30 tablet 0    pimecrolimus (ELIDEL) 1 % cream Apply topically 2 (two) times daily. 100 g 0     Facility-Administered Encounter Medications as of 6/16/2025   Medication Dose Route Frequency Provider Last Rate Last Admin    ketorolac injection 30 mg  30 mg Intramuscular 1 time in Clinic/HOD         [COMPLETED] ketorolac injection 30 mg  30 mg Intramuscular 1 time in Clinic/HOD    30 mg at 06/16/25 1105    [DISCONTINUED] triamcinolone acetonide injection 40 mg  40 mg Intramuscular 1 time in Clinic/HOD

## 2025-06-17 ENCOUNTER — RESULTS FOLLOW-UP (OUTPATIENT)
Dept: PRIMARY CARE CLINIC | Facility: CLINIC | Age: 65
End: 2025-06-17

## 2025-06-17 LAB
ALBUMIN/CREAT UR: NORMAL
AMPHET UR QL SCN: NEGATIVE
BARBITURATE SCN PRESENT UR: NEGATIVE
BENZODIAZ UR QL SCN: NEGATIVE
CANNABINOIDS UR QL SCN: NEGATIVE
COCAINE UR QL SCN: ABNORMAL
CREAT UR-MCNC: 46 MG/DL (ref 23–375)
CREAT UR-MCNC: 46 MG/DL (ref 23–375)
ETHANOL UR-MCNC: <10 MG/DL
HCV AB SERPL QL IA: REACTIVE
HIV 1+2 AB+HIV1 P24 AG SERPL QL IA: NORMAL
METHADONE UR QL SCN: NEGATIVE
MICROALBUMIN UR-MCNC: <5 UG/ML (ref ?–5000)
OPIATES UR QL SCN: NEGATIVE
PCP UR QL: NEGATIVE
T PALLIDUM IGG+IGM SER QL: NORMAL

## 2025-07-11 ENCOUNTER — TELEPHONE (OUTPATIENT)
Dept: PRIMARY CARE CLINIC | Facility: CLINIC | Age: 65
End: 2025-07-11
Payer: MEDICAID

## 2025-07-11 ENCOUNTER — TELEPHONE (OUTPATIENT)
Dept: PRIMARY CARE CLINIC | Facility: CLINIC | Age: 65
End: 2025-07-11

## 2025-07-11 DIAGNOSIS — L28.2 PRURITIC RASH: Primary | ICD-10-CM

## 2025-07-11 DIAGNOSIS — M54.16 LUMBAR BACK PAIN WITH RADICULOPATHY AFFECTING RIGHT LOWER EXTREMITY: ICD-10-CM

## 2025-07-11 RX ORDER — HYDROXYZINE HYDROCHLORIDE 25 MG/1
25 TABLET, FILM COATED ORAL 3 TIMES DAILY PRN
Qty: 30 TABLET | Refills: 1 | Status: SHIPPED | OUTPATIENT
Start: 2025-07-11

## 2025-07-11 NOTE — TELEPHONE ENCOUNTER
Called to inform provider is requesting to schedule appt to discuss plan of care due to insurance denying MRI no answer.

## 2025-08-09 ENCOUNTER — HOSPITAL ENCOUNTER (EMERGENCY)
Facility: HOSPITAL | Age: 65
Discharge: HOME | End: 2025-08-09
Attending: EMERGENCY MEDICINE
Payer: MEDICAID

## 2025-08-09 VITALS
OXYGEN SATURATION: 97 % | BODY MASS INDEX: 25.18 KG/M2 | HEART RATE: 80 BPM | HEIGHT: 69 IN | TEMPERATURE: 98 F | RESPIRATION RATE: 16 BRPM | SYSTOLIC BLOOD PRESSURE: 126 MMHG | DIASTOLIC BLOOD PRESSURE: 79 MMHG | WEIGHT: 170 LBS

## 2025-08-09 DIAGNOSIS — L30.9 ECZEMA, UNSPECIFIED TYPE: Primary | ICD-10-CM

## 2025-08-09 PROCEDURE — 99284 EMERGENCY DEPT VISIT MOD MDM: CPT

## 2025-08-09 RX ORDER — PIMECROLIMUS 10 MG/G
CREAM TOPICAL 2 TIMES DAILY
Qty: 60 G | Refills: 0 | Status: SHIPPED | OUTPATIENT
Start: 2025-08-09

## 2025-08-09 RX ORDER — PREDNISONE 20 MG/1
TABLET ORAL
Qty: 7 TABLET | Refills: 0 | Status: SHIPPED | OUTPATIENT
Start: 2025-08-09 | End: 2025-08-14

## 2025-08-11 ENCOUNTER — TELEPHONE (OUTPATIENT)
Dept: DERMATOLOGY | Facility: CLINIC | Age: 65
End: 2025-08-11
Payer: MEDICAID

## 2025-08-11 ENCOUNTER — PATIENT OUTREACH (OUTPATIENT)
Dept: ADMINISTRATIVE | Facility: HOSPITAL | Age: 65
End: 2025-08-11
Payer: MEDICAID

## 2025-08-19 ENCOUNTER — PATIENT OUTREACH (OUTPATIENT)
Dept: ADMINISTRATIVE | Facility: HOSPITAL | Age: 65
End: 2025-08-19
Payer: MEDICAID

## 2025-08-26 ENCOUNTER — PATIENT OUTREACH (OUTPATIENT)
Dept: ADMINISTRATIVE | Facility: HOSPITAL | Age: 65
End: 2025-08-26
Payer: MEDICAID

## 2025-08-26 DIAGNOSIS — Z87.891 HISTORY OF TOBACCO USE: Primary | ICD-10-CM
